# Patient Record
Sex: MALE | Race: BLACK OR AFRICAN AMERICAN | NOT HISPANIC OR LATINO | Employment: FULL TIME | URBAN - METROPOLITAN AREA
[De-identification: names, ages, dates, MRNs, and addresses within clinical notes are randomized per-mention and may not be internally consistent; named-entity substitution may affect disease eponyms.]

---

## 2017-06-12 ENCOUNTER — OFFICE VISIT (OUTPATIENT)
Dept: FAMILY MEDICINE CLINIC | Facility: CLINIC | Age: 57
End: 2017-06-12

## 2017-06-12 VITALS
SYSTOLIC BLOOD PRESSURE: 115 MMHG | WEIGHT: 220 LBS | RESPIRATION RATE: 14 BRPM | DIASTOLIC BLOOD PRESSURE: 75 MMHG | HEIGHT: 74 IN | HEART RATE: 72 BPM | TEMPERATURE: 98 F | BODY MASS INDEX: 28.23 KG/M2

## 2017-06-12 DIAGNOSIS — I10 BENIGN ESSENTIAL HYPERTENSION: ICD-10-CM

## 2017-06-12 DIAGNOSIS — E78.2 MIXED HYPERLIPIDEMIA: Primary | ICD-10-CM

## 2017-06-12 PROCEDURE — 99213 OFFICE O/P EST LOW 20 MIN: CPT | Performed by: FAMILY MEDICINE

## 2017-06-12 RX ORDER — METOPROLOL SUCCINATE 100 MG/1
100 TABLET, EXTENDED RELEASE ORAL DAILY
Qty: 90 TABLET | Refills: 1 | Status: SHIPPED | OUTPATIENT
Start: 2017-06-12 | End: 2017-08-07

## 2017-06-12 RX ORDER — AMLODIPINE BESYLATE 10 MG/1
10 TABLET ORAL DAILY
Qty: 90 TABLET | Refills: 1 | Status: SHIPPED | OUTPATIENT
Start: 2017-06-12 | End: 2018-02-21 | Stop reason: SDUPTHER

## 2017-06-12 RX ORDER — MONTELUKAST SODIUM 10 MG/1
10 TABLET ORAL NIGHTLY
Qty: 30 TABLET | Refills: 11 | Status: CANCELLED | OUTPATIENT
Start: 2017-06-12

## 2017-06-12 RX ORDER — FLUTICASONE PROPIONATE 50 MCG
2 SPRAY, SUSPENSION (ML) NASAL DAILY
Qty: 1 EACH | Refills: 11 | Status: CANCELLED | OUTPATIENT
Start: 2017-06-12

## 2017-06-12 NOTE — PROGRESS NOTES
Subjective   Kenrick Webster is a 56 y.o. male.     History of Present Illness     Chief Complaint:   Chief Complaint   Patient presents with   • Hypertension     med refill    • Hyperlipidemia       Kenrick Webster 56 y.o. male who presents today for Medical Management of the below listed issues and medication refills.  he has a history of   Patient Active Problem List   Diagnosis   • DVT (deep venous thrombosis)   • Benign essential hypertension   • Renal insufficiency   • Vitamin D deficiency   • Hyperlipidemia   • Gastroesophageal reflux disease without esophagitis   • Moderate persistent asthma without complication   .  Since the last visit, he has overall felt well.  he has been compliant with   Current Outpatient Prescriptions:   •  amLODIPine (NORVASC) 10 MG tablet, Take 1 tablet by mouth Daily., Disp: 90 tablet, Rfl: 1  •  fluticasone (FLONASE) 50 MCG/ACT nasal spray, 2 sprays into each nostril Daily. Administer 2 sprays in each nostril for each dose., Disp: 1 each, Rfl: 11  •  azelastine (ASTELIN) 0.1 % nasal spray, 2 sprays into each nostril 2 (Two) Times a Day. Use in each nostril as directed, Disp: , Rfl:   •  cholecalciferol (VITAMIN D3) 1000 UNITS tablet, Take 1,000 Units by mouth daily., Disp: , Rfl:   •  metoprolol succinate XL (TOPROL-XL) 100 MG 24 hr tablet, Take 1 tablet by mouth Daily., Disp: 90 tablet, Rfl: 1  •  mometasone-formoterol (DULERA 200) 200-5 MCG/ACT inhaler, Inhale 2 puffs 2 (Two) Times a Day., Disp: , Rfl:   •  montelukast (SINGULAIR) 10 MG tablet, Take 1 tablet by mouth Every Night., Disp: 30 tablet, Rfl: 11  •  omeprazole (priLOSEC) 20 MG capsule, Take 40 mg by mouth Daily., Disp: , Rfl:   •  pantoprazole (PROTONIX) 40 MG EC tablet, Take 40 mg by mouth Daily., Disp: , Rfl:   •  tobramycin 0.3 % solution ophthalmic solution, Administer 1 drop to both eyes Every 4 (Four) Hours While Awake. One drop to affected eye QID x one week for infection, Disp: 5 mL, Rfl: 0.  he denies  "medication side effects.    All of the chronic condition(s) listed above are stable w/o issues.    /75  Pulse 72  Temp 98 °F (36.7 °C) (Oral)   Resp 14  Ht 73.5\" (186.7 cm)  Wt 220 lb (99.8 kg)  BMI 28.63 kg/m2    Results for orders placed or performed in visit on 12/19/16   Comprehensive metabolic panel   Result Value Ref Range    Glucose 93 65 - 99 mg/dL    BUN 9 6 - 20 mg/dL    Creatinine 1.60 (H) 0.76 - 1.27 mg/dL    eGFR Non African Am 45 (L) >60 mL/min/1.73    eGFR African Am 55 (L) >60 mL/min/1.73    BUN/Creatinine Ratio 5.6 (L) 7.0 - 25.0    Sodium 143 136 - 145 mmol/L    Potassium 4.2 3.5 - 5.2 mmol/L    Chloride 102 98 - 107 mmol/L    Total CO2 29.1 (H) 22.0 - 29.0 mmol/L    Calcium 9.5 8.6 - 10.5 mg/dL    Total Protein 7.0 6.0 - 8.5 g/dL    Albumin 4.30 3.50 - 5.20 g/dL    Globulin 2.7 gm/dL    A/G Ratio 1.6 g/dL    Total Bilirubin 1.1 0.1 - 1.2 mg/dL    Alkaline Phosphatase 63 39 - 117 U/L    AST (SGOT) 14 1 - 40 U/L    ALT (SGPT) 14 1 - 41 U/L   Lipid panel   Result Value Ref Range    Total Cholesterol 209 (H) 0 - 200 mg/dL    Triglycerides 48 0 - 150 mg/dL    HDL Cholesterol 68 (H) 40 - 60 mg/dL    VLDL Cholesterol 9.6 5 - 40 mg/dL    LDL Cholesterol  131 (H) 0 - 100 mg/dL   TSH   Result Value Ref Range    TSH 1.200 0.270 - 4.200 mIU/mL   CBC and Differential   Result Value Ref Range    WBC 7.09 4.50 - 10.70 10*3/mm3    RBC 5.09 4.60 - 6.00 10*6/mm3    Hemoglobin 15.1 13.7 - 17.6 g/dL    Hematocrit 47.3 40.4 - 52.2 %    MCV 92.9 79.8 - 96.2 fL    MCH 29.7 27.0 - 32.7 pg    MCHC 31.9 (L) 32.6 - 36.4 g/dL    RDW 13.6 11.5 - 14.5 %    Platelets 312 140 - 500 10*3/mm3    Neutrophil Rel % 50.9 42.7 - 76.0 %    Lymphocyte Rel % 35.0 19.6 - 45.3 %    Monocyte Rel % 8.0 5.0 - 12.0 %    Eosinophil Rel % 5.4 0.3 - 6.2 %    Basophil Rel % 0.7 0.0 - 1.5 %    Neutrophils Absolute 3.61 1.90 - 8.10 10*3/mm3    Lymphocytes Absolute 2.48 0.90 - 4.80 10*3/mm3    Monocytes Absolute 0.57 0.20 - 1.20 10*3/mm3 "    Eosinophils Absolute 0.38 0.00 - 0.70 10*3/mm3    Basophils Absolute 0.05 0.00 - 0.20 10*3/mm3    Immature Granulocyte Rel % 0.0 0.0 - 0.5 %    Immature Grans Absolute 0.00 0.00 - 0.03 10*3/mm3         The following portions of the patient's history were reviewed and updated as appropriate: allergies, current medications, past family history, past medical history, past social history, past surgical history and problem list.    Review of Systems   Constitutional: Negative for activity change, chills, fatigue and fever.   Respiratory: Negative for cough and shortness of breath.    Cardiovascular: Negative for chest pain and palpitations.   Gastrointestinal: Negative for abdominal pain.   Endocrine: Negative for cold intolerance.   Psychiatric/Behavioral: Negative for behavioral problems and dysphoric mood. The patient is not nervous/anxious.        Objective   Physical Exam   Constitutional: He appears well-developed and well-nourished.   Neck: Neck supple. No thyromegaly present.   Cardiovascular: Normal rate and regular rhythm.    No murmur heard.  Pulmonary/Chest: Effort normal. He has wheezes (mild).   Abdominal: Bowel sounds are normal.   Psychiatric: He has a normal mood and affect. His behavior is normal.   Nursing note and vitals reviewed.      Assessment/Plan   Kenrick was seen today for hypertension and hyperlipidemia.    Diagnoses and all orders for this visit:    Mixed hyperlipidemia  -     Lipid Panel    Benign essential hypertension  -     metoprolol succinate XL (TOPROL-XL) 100 MG 24 hr tablet; Take 1 tablet by mouth Daily.  -     amLODIPine (NORVASC) 10 MG tablet; Take 1 tablet by mouth Daily.  -     Comprehensive Metabolic Panel    Other orders  -     Cancel: montelukast (SINGULAIR) 10 MG tablet; Take 1 tablet by mouth Every Night.  -     Cancel: fluticasone (FLONASE) 50 MCG/ACT nasal spray; 2 sprays into each nostril Daily. Administer 2 sprays in each nostril for each dose.

## 2017-06-14 LAB
ALBUMIN SERPL-MCNC: 4.1 G/DL (ref 3.5–5.5)
ALBUMIN/GLOB SERPL: 1.4 {RATIO} (ref 1.2–2.2)
ALP SERPL-CCNC: 64 IU/L (ref 39–117)
ALT SERPL-CCNC: 12 IU/L (ref 0–44)
AST SERPL-CCNC: 17 IU/L (ref 0–40)
BILIRUB SERPL-MCNC: 1.3 MG/DL (ref 0–1.2)
BUN SERPL-MCNC: 7 MG/DL (ref 6–24)
BUN/CREAT SERPL: 4 (ref 9–20)
CALCIUM SERPL-MCNC: 9.5 MG/DL (ref 8.7–10.2)
CHLORIDE SERPL-SCNC: 99 MMOL/L (ref 96–106)
CHOLEST SERPL-MCNC: 212 MG/DL (ref 100–199)
CO2 SERPL-SCNC: 26 MMOL/L (ref 18–29)
CREAT SERPL-MCNC: 1.56 MG/DL (ref 0.76–1.27)
GLOBULIN SER CALC-MCNC: 2.9 G/DL (ref 1.5–4.5)
GLUCOSE SERPL-MCNC: 89 MG/DL (ref 65–99)
HDLC SERPL-MCNC: 59 MG/DL
LDLC SERPL CALC-MCNC: 135 MG/DL (ref 0–99)
POTASSIUM SERPL-SCNC: 3.9 MMOL/L (ref 3.5–5.2)
PROT SERPL-MCNC: 7 G/DL (ref 6–8.5)
SODIUM SERPL-SCNC: 142 MMOL/L (ref 134–144)
TRIGL SERPL-MCNC: 91 MG/DL (ref 0–149)
VLDLC SERPL CALC-MCNC: 18 MG/DL (ref 5–40)

## 2017-06-18 DIAGNOSIS — K21.9 GASTROESOPHAGEAL REFLUX DISEASE WITHOUT ESOPHAGITIS: ICD-10-CM

## 2017-06-19 RX ORDER — OMEPRAZOLE 40 MG/1
CAPSULE, DELAYED RELEASE ORAL
Qty: 90 CAPSULE | Refills: 1 | OUTPATIENT
Start: 2017-06-19

## 2017-08-07 ENCOUNTER — OFFICE VISIT (OUTPATIENT)
Dept: FAMILY MEDICINE CLINIC | Facility: CLINIC | Age: 57
End: 2017-08-07

## 2017-08-07 VITALS
TEMPERATURE: 98.8 F | SYSTOLIC BLOOD PRESSURE: 119 MMHG | HEART RATE: 78 BPM | HEIGHT: 73 IN | WEIGHT: 232 LBS | RESPIRATION RATE: 16 BRPM | DIASTOLIC BLOOD PRESSURE: 81 MMHG | BODY MASS INDEX: 30.75 KG/M2

## 2017-08-07 DIAGNOSIS — E78.2 MIXED HYPERLIPIDEMIA: ICD-10-CM

## 2017-08-07 DIAGNOSIS — I10 BENIGN ESSENTIAL HYPERTENSION: Primary | ICD-10-CM

## 2017-08-07 PROCEDURE — 99213 OFFICE O/P EST LOW 20 MIN: CPT | Performed by: FAMILY MEDICINE

## 2017-08-07 RX ORDER — ATORVASTATIN CALCIUM 10 MG/1
10 TABLET, FILM COATED ORAL DAILY
Qty: 90 TABLET | Refills: 1 | Status: SHIPPED | OUTPATIENT
Start: 2017-08-07 | End: 2018-02-21 | Stop reason: SDUPTHER

## 2017-08-07 RX ORDER — LOSARTAN POTASSIUM 50 MG/1
50 TABLET ORAL DAILY
Qty: 90 TABLET | Refills: 1 | Status: SHIPPED | OUTPATIENT
Start: 2017-08-07 | End: 2018-02-21 | Stop reason: SDUPTHER

## 2017-08-07 NOTE — PROGRESS NOTES
Subjective   Kenrick Webster is a 56 y.o. male.     History of Present Illness     Chief Complaint:   Chief Complaint   Patient presents with   • Hypertension     TO DISCUSS STOPPING TOPROL XL MEDCATION   • Hyperlipidemia       Kenrick Webster 56 y.o. male who presents today for Medical Management of the below listed issues and medication refills.  he has a history of   Patient Active Problem List   Diagnosis   • DVT (deep venous thrombosis)   • Benign essential hypertension   • Renal insufficiency   • Vitamin D deficiency   • Hyperlipidemia   • Gastroesophageal reflux disease without esophagitis   • Moderate persistent asthma without complication   .  Since the last visit, he has overall felt well. He has talked with his allergist, Dr. Galindo, who recommended that we stop his B-Blocker due to need for EpiPen given.  he has been compliant with   Current Outpatient Prescriptions:   •  amLODIPine (NORVASC) 10 MG tablet, Take 1 tablet by mouth Daily., Disp: 90 tablet, Rfl: 1  •  atorvastatin (LIPITOR) 10 MG tablet, Take 1 tablet by mouth Daily., Disp: 90 tablet, Rfl: 1  •  azelastine (ASTELIN) 0.1 % nasal spray, 2 sprays into each nostril 2 (Two) Times a Day. Use in each nostril as directed, Disp: , Rfl:   •  cholecalciferol (VITAMIN D3) 1000 UNITS tablet, Take 1,000 Units by mouth daily., Disp: , Rfl:   •  fluticasone (FLONASE) 50 MCG/ACT nasal spray, 2 sprays into each nostril Daily. Administer 2 sprays in each nostril for each dose., Disp: 1 each, Rfl: 11  •  losartan (COZAAR) 50 MG tablet, Take 1 tablet by mouth Daily., Disp: 90 tablet, Rfl: 1  •  mometasone-formoterol (DULERA 200) 200-5 MCG/ACT inhaler, Inhale 2 puffs 2 (Two) Times a Day., Disp: , Rfl:   •  montelukast (SINGULAIR) 10 MG tablet, Take 1 tablet by mouth Every Night., Disp: 30 tablet, Rfl: 11  •  omeprazole (priLOSEC) 20 MG capsule, Take 40 mg by mouth Daily., Disp: , Rfl:   •  pantoprazole (PROTONIX) 40 MG EC tablet, Take 40 mg by mouth Daily.,  "Disp: , Rfl: .  he denies medication side effects.    His LDL of 135 (June) needs to be addressed today, too.    All of the chronic condition(s) listed above are stable w/o issues.    /81  Pulse 78  Temp 98.8 °F (37.1 °C) (Oral)   Resp 16  Ht 73\" (185.4 cm)  Wt 232 lb (105 kg)  BMI 30.61 kg/m2    Results for orders placed or performed in visit on 06/12/17   Comprehensive Metabolic Panel   Result Value Ref Range    Glucose 89 65 - 99 mg/dL    BUN 7 6 - 24 mg/dL    Creatinine 1.56 (H) 0.76 - 1.27 mg/dL    eGFR Non African Am 50 (L) >59 mL/min/1.73    eGFR  Am 57 (L) >59 mL/min/1.73    BUN/Creatinine Ratio 4 (L) 9 - 20    Sodium 142 134 - 144 mmol/L    Potassium 3.9 3.5 - 5.2 mmol/L    Chloride 99 96 - 106 mmol/L    Total CO2 26 18 - 29 mmol/L    Calcium 9.5 8.7 - 10.2 mg/dL    Total Protein 7.0 6.0 - 8.5 g/dL    Albumin 4.1 3.5 - 5.5 g/dL    Globulin 2.9 1.5 - 4.5 g/dL    A/G Ratio 1.4 1.2 - 2.2    Total Bilirubin 1.3 (H) 0.0 - 1.2 mg/dL    Alkaline Phosphatase 64 39 - 117 IU/L    AST (SGOT) 17 0 - 40 IU/L    ALT (SGPT) 12 0 - 44 IU/L   Lipid Panel   Result Value Ref Range    Total Cholesterol 212 (H) 100 - 199 mg/dL    Triglycerides 91 0 - 149 mg/dL    HDL Cholesterol 59 >39 mg/dL    VLDL Cholesterol 18 5 - 40 mg/dL    LDL Cholesterol  135 (H) 0 - 99 mg/dL         The following portions of the patient's history were reviewed and updated as appropriate: allergies, current medications, past family history, past medical history, past social history, past surgical history and problem list.    Review of Systems   Constitutional: Negative for activity change, chills, fatigue and fever.   Respiratory: Negative for cough and shortness of breath.    Cardiovascular: Negative for chest pain and palpitations.   Gastrointestinal: Negative for abdominal pain.   Endocrine: Negative for cold intolerance.   Psychiatric/Behavioral: Negative for behavioral problems and dysphoric mood. The patient is not " nervous/anxious.        Objective   Physical Exam   Constitutional: He appears well-developed and well-nourished.   Neck: Neck supple. No thyromegaly present.   Cardiovascular: Normal rate and regular rhythm.    No murmur heard.  Pulmonary/Chest: Effort normal and breath sounds normal.   Abdominal: Bowel sounds are normal.   Psychiatric: He has a normal mood and affect. His behavior is normal.   Nursing note and vitals reviewed.      Assessment/Plan   Kenrick was seen today for hypertension and hyperlipidemia.    Diagnoses and all orders for this visit:    Benign essential hypertension  Comments:  change in therapy  Orders:  -     losartan (COZAAR) 50 MG tablet; Take 1 tablet by mouth Daily.  -     Basic Metabolic Panel; Future    Mixed hyperlipidemia  -     atorvastatin (LIPITOR) 10 MG tablet; Take 1 tablet by mouth Daily.

## 2017-08-14 ENCOUNTER — RESULTS ENCOUNTER (OUTPATIENT)
Dept: FAMILY MEDICINE CLINIC | Facility: CLINIC | Age: 57
End: 2017-08-14

## 2017-08-14 DIAGNOSIS — I10 BENIGN ESSENTIAL HYPERTENSION: ICD-10-CM

## 2017-09-25 ENCOUNTER — APPOINTMENT (OUTPATIENT)
Dept: GENERAL RADIOLOGY | Facility: HOSPITAL | Age: 57
End: 2017-09-25

## 2017-09-25 PROCEDURE — 73562 X-RAY EXAM OF KNEE 3: CPT | Performed by: FAMILY MEDICINE

## 2017-09-27 ENCOUNTER — APPOINTMENT (OUTPATIENT)
Dept: GENERAL RADIOLOGY | Facility: HOSPITAL | Age: 57
End: 2017-09-27

## 2017-09-27 ENCOUNTER — HOSPITAL ENCOUNTER (EMERGENCY)
Facility: HOSPITAL | Age: 57
Discharge: HOME | End: 2017-09-27
Attending: EMERGENCY MEDICINE | Admitting: EMERGENCY MEDICINE

## 2017-09-27 VITALS
TEMPERATURE: 98 F | WEIGHT: 200 LBS | BODY MASS INDEX: 27.09 KG/M2 | RESPIRATION RATE: 16 BRPM | HEIGHT: 72 IN | OXYGEN SATURATION: 98 % | HEART RATE: 71 BPM | SYSTOLIC BLOOD PRESSURE: 110 MMHG | DIASTOLIC BLOOD PRESSURE: 81 MMHG

## 2017-09-27 DIAGNOSIS — S86.911A KNEE STRAIN, RIGHT, INITIAL ENCOUNTER: Primary | ICD-10-CM

## 2017-09-27 PROCEDURE — 99283 EMERGENCY DEPT VISIT LOW MDM: CPT

## 2017-09-30 LAB
BUN SERPL-MCNC: 11 MG/DL (ref 6–20)
BUN/CREAT SERPL: 7.5 (ref 7–25)
CALCIUM SERPL-MCNC: 9.4 MG/DL (ref 8.6–10.5)
CHLORIDE SERPL-SCNC: 102 MMOL/L (ref 98–107)
CO2 SERPL-SCNC: 24.4 MMOL/L (ref 22–29)
CREAT SERPL-MCNC: 1.46 MG/DL (ref 0.76–1.27)
GLUCOSE SERPL-MCNC: 90 MG/DL (ref 65–99)
POTASSIUM SERPL-SCNC: 3.9 MMOL/L (ref 3.5–5.2)
SODIUM SERPL-SCNC: 142 MMOL/L (ref 136–145)

## 2017-10-04 ENCOUNTER — OFFICE VISIT (OUTPATIENT)
Dept: FAMILY MEDICINE CLINIC | Facility: CLINIC | Age: 57
End: 2017-10-04

## 2017-10-04 VITALS
DIASTOLIC BLOOD PRESSURE: 76 MMHG | TEMPERATURE: 98.7 F | BODY MASS INDEX: 28.11 KG/M2 | HEART RATE: 88 BPM | HEIGHT: 74 IN | RESPIRATION RATE: 16 BRPM | SYSTOLIC BLOOD PRESSURE: 107 MMHG | WEIGHT: 219 LBS

## 2017-10-04 DIAGNOSIS — M25.561 ACUTE PAIN OF RIGHT KNEE: Primary | ICD-10-CM

## 2017-10-04 DIAGNOSIS — I10 BENIGN ESSENTIAL HYPERTENSION: ICD-10-CM

## 2017-10-04 PROCEDURE — 99213 OFFICE O/P EST LOW 20 MIN: CPT | Performed by: FAMILY MEDICINE

## 2017-10-04 NOTE — PROGRESS NOTES
"Subjective   Kenrick Webster is a 56 y.o. male.     CC: Hospital ED F/U for Right Knee Pain    History of Present Illness     Pt returns today after a recent ED visit for the above. That visit was as follows:    HPI:  Pt is a 56 y.o. male, with hx of arthritis, who presents complaining of R knee pain and swelling for ~ 5 days after he heard a \"pop\" while he was rushing to walk into a . Pt was seen at urgent care 2 days ago and had a R knee XR that showed arthritis but no acute fracture or abnormality. Pt states hx of similar pain ~2 years ago related to arthritis and states he has a knee brace but has not been wearing it.   Pt is concerned because he works as a  and was supposed to go back to work today but is unable to move his leg back and forth.     12:12 PM  Advised Pt to ice and wear knee brace when ambulating and continue using diclofenac cream for pain control. Pt cannot take NSAID's due to kidney disease. Pt will be discharged. Pt verbalizes understanding and agrees with plan. All questions and concerns addressed at this time.     DIAGNOSIS  Final diagnoses:   Knee strain, right, initial encounter     New Prescriptions         diclofenac 1 % gel gel   Commonly known as:  VOLTAREN   Apply 4 g topically 4 (Four) Times a Day As Needed (apply to right knee).       Current medication list is compared to recent hospital d/c and the medications are reconciled.    He also had some recent labs for me done regarding his BUN/Cr and K+ and is here to review that today, too.    The following portions of the patient's history were reviewed and updated as appropriate: allergies, current medications, past family history, past medical history, past social history, past surgical history and problem list.    Review of Systems   Constitutional: Negative for activity change, chills, fatigue and fever.   Respiratory: Negative for cough and shortness of breath.    Cardiovascular: Negative for chest pain " and palpitations.   Gastrointestinal: Negative for abdominal pain.   Endocrine: Negative for cold intolerance.   Musculoskeletal:        Right knee pain   Psychiatric/Behavioral: Negative for behavioral problems and dysphoric mood. The patient is not nervous/anxious.        Objective   Physical Exam   Constitutional: He appears well-developed and well-nourished.   Neck: Neck supple. No thyromegaly present.   Cardiovascular: Normal rate and regular rhythm.    No murmur heard.  Pulmonary/Chest: Effort normal and breath sounds normal.   Abdominal: Bowel sounds are normal.   Musculoskeletal: He exhibits tenderness (tender medial/inferior knee w/o laxity).   Psychiatric: He has a normal mood and affect. His behavior is normal.   Nursing note and vitals reviewed.  Hospital records reviewed with pt confirming HPI.  Labs reviewed with pt today during visit. All questions answered.      Assessment/Plan   Kenrick was seen today for hypertension, hyperlipidemia and rt knee injury / pain.    Diagnoses and all orders for this visit:    Acute pain of right knee    Benign essential hypertension      Pt to continue current medications at this time. Keep f/u tomorrow with ortho.regarding the knee. Continue medications.

## 2017-10-06 ENCOUNTER — TELEPHONE (OUTPATIENT)
Dept: SOCIAL WORK | Facility: HOSPITAL | Age: 57
End: 2017-10-06

## 2017-10-17 ENCOUNTER — APPOINTMENT (OUTPATIENT)
Dept: CT IMAGING | Facility: HOSPITAL | Age: 57
End: 2017-10-17

## 2017-10-17 ENCOUNTER — HOSPITAL ENCOUNTER (EMERGENCY)
Facility: HOSPITAL | Age: 57
Discharge: HOME | End: 2017-10-17
Attending: EMERGENCY MEDICINE | Admitting: EMERGENCY MEDICINE

## 2017-10-17 ENCOUNTER — APPOINTMENT (OUTPATIENT)
Dept: GENERAL RADIOLOGY | Facility: HOSPITAL | Age: 57
End: 2017-10-17

## 2017-10-17 VITALS
BODY MASS INDEX: 29.16 KG/M2 | DIASTOLIC BLOOD PRESSURE: 85 MMHG | OXYGEN SATURATION: 100 % | SYSTOLIC BLOOD PRESSURE: 125 MMHG | RESPIRATION RATE: 18 BRPM | TEMPERATURE: 98.1 F | WEIGHT: 220 LBS | HEART RATE: 74 BPM | HEIGHT: 73 IN

## 2017-10-17 DIAGNOSIS — R07.89 ATYPICAL CHEST PAIN: Primary | ICD-10-CM

## 2017-10-17 LAB
ALBUMIN SERPL-MCNC: 4.1 G/DL (ref 3.5–5.2)
ALBUMIN/GLOB SERPL: 1.4 G/DL
ALP SERPL-CCNC: 61 U/L (ref 39–117)
ALT SERPL W P-5'-P-CCNC: 17 U/L (ref 1–41)
ANION GAP SERPL CALCULATED.3IONS-SCNC: 12 MMOL/L
AST SERPL-CCNC: 26 U/L (ref 1–40)
BASOPHILS # BLD AUTO: 0.04 10*3/MM3 (ref 0–0.2)
BASOPHILS NFR BLD AUTO: 0.6 % (ref 0–1.5)
BILIRUB SERPL-MCNC: 1.3 MG/DL (ref 0.1–1.2)
BUN BLD-MCNC: 10 MG/DL (ref 6–20)
BUN/CREAT SERPL: 6.1 (ref 7–25)
CALCIUM SPEC-SCNC: 9.7 MG/DL (ref 8.6–10.5)
CHLORIDE SERPL-SCNC: 103 MMOL/L (ref 98–107)
CO2 SERPL-SCNC: 28 MMOL/L (ref 22–29)
CREAT BLD-MCNC: 1.63 MG/DL (ref 0.76–1.27)
DEPRECATED RDW RBC AUTO: 43.1 FL (ref 37–54)
EOSINOPHIL # BLD AUTO: 0.31 10*3/MM3 (ref 0–0.7)
EOSINOPHIL NFR BLD AUTO: 4.5 % (ref 0.3–6.2)
ERYTHROCYTE [DISTWIDTH] IN BLOOD BY AUTOMATED COUNT: 13.5 % (ref 11.5–14.5)
GFR SERPL CREATININE-BSD FRML MDRD: 54 ML/MIN/1.73
GLOBULIN UR ELPH-MCNC: 3 GM/DL
GLUCOSE BLD-MCNC: 90 MG/DL (ref 65–99)
HCT VFR BLD AUTO: 39.9 % (ref 40.4–52.2)
HGB BLD-MCNC: 13.6 G/DL (ref 13.7–17.6)
HOLD SPECIMEN: NORMAL
HOLD SPECIMEN: NORMAL
IMM GRANULOCYTES # BLD: 0 10*3/MM3 (ref 0–0.03)
IMM GRANULOCYTES NFR BLD: 0 % (ref 0–0.5)
LYMPHOCYTES # BLD AUTO: 3.08 10*3/MM3 (ref 0.9–4.8)
LYMPHOCYTES NFR BLD AUTO: 44.3 % (ref 19.6–45.3)
MCH RBC QN AUTO: 29.6 PG (ref 27–32.7)
MCHC RBC AUTO-ENTMCNC: 34.1 G/DL (ref 32.6–36.4)
MCV RBC AUTO: 86.7 FL (ref 79.8–96.2)
MONOCYTES # BLD AUTO: 0.69 10*3/MM3 (ref 0.2–1.2)
MONOCYTES NFR BLD AUTO: 9.9 % (ref 5–12)
NEUTROPHILS # BLD AUTO: 2.84 10*3/MM3 (ref 1.9–8.1)
NEUTROPHILS NFR BLD AUTO: 40.7 % (ref 42.7–76)
PLATELET # BLD AUTO: 288 10*3/MM3 (ref 140–500)
PMV BLD AUTO: 9.7 FL (ref 6–12)
POTASSIUM BLD-SCNC: 3.5 MMOL/L (ref 3.5–5.2)
PROT SERPL-MCNC: 7.1 G/DL (ref 6–8.5)
RBC # BLD AUTO: 4.6 10*6/MM3 (ref 4.6–6)
SODIUM BLD-SCNC: 143 MMOL/L (ref 136–145)
TROPONIN T SERPL-MCNC: <0.01 NG/ML (ref 0–0.03)
TROPONIN T SERPL-MCNC: <0.01 NG/ML (ref 0–0.03)
WBC NRBC COR # BLD: 6.96 10*3/MM3 (ref 4.5–10.7)
WHOLE BLOOD HOLD SPECIMEN: NORMAL
WHOLE BLOOD HOLD SPECIMEN: NORMAL

## 2017-10-17 PROCEDURE — 84484 ASSAY OF TROPONIN QUANT: CPT | Performed by: PHYSICIAN ASSISTANT

## 2017-10-17 PROCEDURE — 93010 ELECTROCARDIOGRAM REPORT: CPT | Performed by: INTERNAL MEDICINE

## 2017-10-17 PROCEDURE — 85025 COMPLETE CBC W/AUTO DIFF WBC: CPT | Performed by: EMERGENCY MEDICINE

## 2017-10-17 PROCEDURE — 96361 HYDRATE IV INFUSION ADD-ON: CPT

## 2017-10-17 PROCEDURE — 80053 COMPREHEN METABOLIC PANEL: CPT | Performed by: EMERGENCY MEDICINE

## 2017-10-17 PROCEDURE — 99284 EMERGENCY DEPT VISIT MOD MDM: CPT

## 2017-10-17 PROCEDURE — 84484 ASSAY OF TROPONIN QUANT: CPT | Performed by: EMERGENCY MEDICINE

## 2017-10-17 PROCEDURE — 96360 HYDRATION IV INFUSION INIT: CPT

## 2017-10-17 PROCEDURE — 0 IOPAMIDOL PER 1 ML: Performed by: EMERGENCY MEDICINE

## 2017-10-17 PROCEDURE — 71020 HC CHEST PA AND LATERAL: CPT

## 2017-10-17 PROCEDURE — 71275 CT ANGIOGRAPHY CHEST: CPT

## 2017-10-17 PROCEDURE — 93005 ELECTROCARDIOGRAM TRACING: CPT | Performed by: EMERGENCY MEDICINE

## 2017-10-17 RX ORDER — SODIUM CHLORIDE 0.9 % (FLUSH) 0.9 %
10 SYRINGE (ML) INJECTION AS NEEDED
Status: DISCONTINUED | OUTPATIENT
Start: 2017-10-17 | End: 2017-10-18 | Stop reason: HOSPADM

## 2017-10-17 RX ORDER — ASPIRIN 325 MG
325 TABLET ORAL ONCE
Status: COMPLETED | OUTPATIENT
Start: 2017-10-17 | End: 2017-10-17

## 2017-10-17 RX ADMIN — ASPIRIN 325 MG: 325 TABLET ORAL at 21:08

## 2017-10-17 RX ADMIN — SODIUM CHLORIDE 1000 ML: 9 INJECTION, SOLUTION INTRAVENOUS at 21:08

## 2017-10-17 RX ADMIN — IOPAMIDOL 95 ML: 755 INJECTION, SOLUTION INTRAVENOUS at 21:41

## 2017-10-18 NOTE — DISCHARGE INSTRUCTIONS
PLEASE READ AND REVIEW ALL DISCHARGE INSTRUCTIONS.     Please follow up with your primary care physician for any further evaluation/treatment and further management of your blood pressure.     Recheck in emergency department for any worsening and/or concerning symptoms.     Take all prescribed medicine as written and continue chronic medication.    Please follow up with cardiology to schedule outpatient stress test.

## 2017-10-18 NOTE — ED PROVIDER NOTES
The patient presents complaining of constant CP starting at 1600 today. Pt states a Hx of DVT and hypertension.     Physical Exam:   Pt is resting comfortably, and in no acute distress.     Labs:   2211 Troponin <0.010, 2212 Troponin <0.010    Radiology:   CTA Chest: negative.     Plan:   Will d/c the pt with a f/u with a cardiologist.     I supervised care provided by the midlevel provider.  We have discussed this patient's history, physical exam, and treatment plan.  I have reviewed the note and personally saw and examined the patient and agree with the plan of care.    Documentation assistance provided by dallas Kirkpatrick.  Information recorded by the scribe was done at my direction and has been verified and validated by me.     Arslan Kirkpatrick  10/17/17 [id number]       Bharathi Dueñas MD  10/18/17 [id number]

## 2017-10-18 NOTE — ED PROVIDER NOTES
EMERGENCY DEPARTMENT ENCOUNTER    CHIEF COMPLAINT  Chief Complaint: Chest Pain  History given by:Patient  History limited by:Nothing  Room Number: 26/26  PMD: Galileo Urbina MD      HPI:  Pt is a 56 y.o. 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 The patient explains that the pain is exacerbated with palpation and is alleviated with no known factors. He denies any associated dyspnea, cough, diaphoresis, nausea or vomiting since onset. Patient denies having any previous cardiac workup in the past and he has never seen a Cardiologist. He notes that he has h/o HTN and has family h/o CAD. No other complaints at this time.    Duration: 4 hours  Timing:Constant  Location:Just left of the center of the chest  Radiation:None  Quality:Sharp  Intensity/Severity:Moderate  Progression:No Change  Associated Symptoms:Chest pain  Aggravating Factors:Palpation  Alleviating Factors:Unknown  Previous Episodes:None  Treatment before arrival:None mentioned     MEDICAL RECORD REVIEW  H/o DVT but does not list any anticoagulation, HTN. Do not seen any previous Cardiology evaluations.     PAST MEDICAL HISTORY  Active Ambulatory Problems     Diagnosis Date Noted   • DVT (deep venous thrombosis)    • Benign essential hypertension    • Renal insufficiency    • Vitamin D deficiency    • Hyperlipidemia    • Gastroesophageal reflux disease without esophagitis 11/25/2015   • Moderate persistent asthma without complication 12/19/2016     Resolved Ambulatory Problems     Diagnosis Date Noted   • No Resolved Ambulatory Problems     Past Medical History:   Diagnosis Date   • Anxiety and depression    • Back pain    • Benign essential hypertension    • Bursitis    • DVT (deep venous thrombosis)    • H/O complete eye exam scheduled   • Hyperlipidemia    • Renal  insufficiency    • Vitamin D deficiency        PAST SURGICAL HISTORY  Past Surgical History:   Procedure Laterality Date   • COLONOSCOPY     • ENDOSCOPY     • OTHER SURGICAL HISTORY      gallbladder testing or surgery       FAMILY HISTORY  Family History   Problem Relation Age of Onset   • Hypertension Sister    • Kidney disease Sister    • Diabetes Brother        SOCIAL HISTORY  Social History     Social History   • Marital status: Single     Spouse name: N/A   • Number of children: N/A   • Years of education: N/A     Occupational History   • Not on file.     Social History Main Topics   • Smoking status: Never Smoker   • Smokeless tobacco: Never Used   • Alcohol use No   • Drug use: No   • Sexual activity: Not on file     Other Topics Concern   • Not on file     Social History Narrative       ALLERGIES  Review of patient's allergies indicates no known allergies.    REVIEW OF SYSTEMS  Review of Systems   Constitutional: Negative.  Negative for chills and fever.   HENT: Negative.  Negative for sore throat.    Eyes: Negative.    Respiratory: Negative.  Negative for cough.    Cardiovascular: Positive for chest pain.   Gastrointestinal: Negative.    Genitourinary: Negative.  Negative for dysuria.   Musculoskeletal: Negative.  Negative for back pain.   Skin: Negative.  Negative for rash.   Neurological: Negative.  Negative for headaches.       PHYSICAL EXAM  ED Triage Vitals   Temp Heart Rate Resp BP SpO2   10/17/17 1958 10/17/17 1958 10/17/17 1958 -- 10/17/17 1958   98.1 °F (36.7 °C) 88 18  98 %      Temp src Heart Rate Source Patient Position BP Location FiO2 (%)   10/17/17 1958 10/17/17 1958 -- -- --   Tympanic Monitor          Physical Exam   Constitutional: He is oriented to person, place, and time and well-developed, well-nourished, and in no distress. No distress.   HENT:   Head: Normocephalic and atraumatic.   Mouth/Throat: Oropharynx is clear and moist.   Eyes: EOM are normal. Pupils are equal, round, and  reactive to light.   Neck: Normal range of motion. Neck supple.   Cardiovascular: Normal rate, regular rhythm and normal heart sounds.    Pulse 75   Pulmonary/Chest: Effort normal and breath sounds normal. No respiratory distress. He has no wheezes. He exhibits no tenderness.   Just left to mid sternum there is a palpable deep knot which is tender to palpation. Chest pain not pleuritic.   Abdominal: Soft. He exhibits no distension. There is no tenderness. There is no rebound and no guarding.   Musculoskeletal: Normal range of motion. He exhibits no edema.   Lymphadenopathy:     He has no cervical adenopathy.   Neurological: He is alert and oriented to person, place, and time.   Skin: Skin is warm and dry. No rash noted. No pallor.   No peripheral edema   Psychiatric: Mood, memory, affect and judgment normal.   Nursing note and vitals reviewed.      LAB RESULTS  Recent Results (from the past 24 hour(s))   Comprehensive Metabolic Panel    Collection Time: 10/17/17  8:12 PM   Result Value Ref Range    Glucose 90 65 - 99 mg/dL    BUN 10 6 - 20 mg/dL    Creatinine 1.63 (H) 0.76 - 1.27 mg/dL    Sodium 143 136 - 145 mmol/L    Potassium 3.5 3.5 - 5.2 mmol/L    Chloride 103 98 - 107 mmol/L    CO2 28.0 22.0 - 29.0 mmol/L    Calcium 9.7 8.6 - 10.5 mg/dL    Total Protein 7.1 6.0 - 8.5 g/dL    Albumin 4.10 3.50 - 5.20 g/dL    ALT (SGPT) 17 1 - 41 U/L    AST (SGOT) 26 1 - 40 U/L    Alkaline Phosphatase 61 39 - 117 U/L    Total Bilirubin 1.3 (H) 0.1 - 1.2 mg/dL    eGFR  African Amer 54 (L) >60 mL/min/1.73    Globulin 3.0 gm/dL    A/G Ratio 1.4 g/dL    BUN/Creatinine Ratio 6.1 (L) 7.0 - 25.0    Anion Gap 12.0 mmol/L   Troponin    Collection Time: 10/17/17  8:12 PM   Result Value Ref Range    Troponin T <0.010 0.000 - 0.030 ng/mL   Light Blue Top    Collection Time: 10/17/17  8:12 PM   Result Value Ref Range    Extra Tube hold for add-on    Green Top (Gel)    Collection Time: 10/17/17  8:12 PM   Result Value Ref Range    Extra Tube  Hold for add-ons.    Lavender Top    Collection Time: 10/17/17  8:12 PM   Result Value Ref Range    Extra Tube hold for add-on    Gold Top - SST    Collection Time: 10/17/17  8:12 PM   Result Value Ref Range    Extra Tube Hold for add-ons.    CBC Auto Differential    Collection Time: 10/17/17  8:12 PM   Result Value Ref Range    WBC 6.96 4.50 - 10.70 10*3/mm3    RBC 4.60 4.60 - 6.00 10*6/mm3    Hemoglobin 13.6 (L) 13.7 - 17.6 g/dL    Hematocrit 39.9 (L) 40.4 - 52.2 %    MCV 86.7 79.8 - 96.2 fL    MCH 29.6 27.0 - 32.7 pg    MCHC 34.1 32.6 - 36.4 g/dL    RDW 13.5 11.5 - 14.5 %    RDW-SD 43.1 37.0 - 54.0 fl    MPV 9.7 6.0 - 12.0 fL    Platelets 288 140 - 500 10*3/mm3    Neutrophil % 40.7 (L) 42.7 - 76.0 %    Lymphocyte % 44.3 19.6 - 45.3 %    Monocyte % 9.9 5.0 - 12.0 %    Eosinophil % 4.5 0.3 - 6.2 %    Basophil % 0.6 0.0 - 1.5 %    Immature Grans % 0.0 0.0 - 0.5 %    Neutrophils, Absolute 2.84 1.90 - 8.10 10*3/mm3    Lymphocytes, Absolute 3.08 0.90 - 4.80 10*3/mm3    Monocytes, Absolute 0.69 0.20 - 1.20 10*3/mm3    Eosinophils, Absolute 0.31 0.00 - 0.70 10*3/mm3    Basophils, Absolute 0.04 0.00 - 0.20 10*3/mm3    Immature Grans, Absolute 0.00 0.00 - 0.03 10*3/mm3   Troponin    Collection Time: 10/17/17 10:11 PM   Result Value Ref Range    Troponin T <0.010 0.000 - 0.030 ng/mL       I ordered the above labs and reviewed the results    RADIOLOGY  CT Angiogram Chest With Contrast   Final Result   1. No active disease or evidence of pulmonary embolism.   2. Small left renal lesion as discussed       This report was finalized on 10/17/2017 10:07 PM by Carlos Block MD.          XR Chest 2 View   Final Result        Reviewed CXR - The lungs are well-expanded and clear and the heart and hilar structures are normal. There is no acute disease.. Independently viewed by me. Interpreted by radiologist.     Reviewed CTA Chest - No active disease or evidence of pulmonary embolism. Small left renal lesion as discussed.  Independently viewed by me. Interpreted by radiologist.       I ordered the above noted radiological studies and reviewed the images on the PACS system.        PROCEDURE    HEARTSCORE    History  Highly suspicious              2    Moderately suspicious             1    Slightly or non-suspicious             0    ECG  Significant ST depression              2    Nonspecific repol disturbance            1    Normal                           0    Age  > or = 65                          2     46-65                           1    < or = 45                          0    Risk factors (hypercholesterolemia, HTN, DM, smoking, pos fam hx, obesity)                            > or = to 3 RF for atherosclerotic dx   2    1 or 2                 1    No risk factors                0    Troponin > or = 3x normal limit               2    1-3x normal limit    1    < or = Normal limit    0    Score  0 - 3 is low risk    This patient's HEART score is 3     We discussed the shared decision pathway regarding the patient's heart score and choice for being discharged home versus admitted to the hospital for further evaluation. Patient is in agreement to be discharged at this time for follow-up with her family physician and/ or cardiologist.        EKG    ekg was interpreted by       COURSE & MEDICAL DECISION MAKING  Pertinent Labs and Imaging studies that were ordered and reviewed are noted above.  Results were reviewed/discussed with the patient and they were also made aware of online assess.  Pt also made aware that some labs, such as cultures, will not be resulted during ER visit and follow up with PMD is necessary.       PROGRESS AND CONSULTS    Progress Notes:    2104  After reviewing the patient's GFR, I ordered CTA Chest and IV fluids for hydration.     2118  Reviewed pt's history and workup with Dr. Dueñas.  After a bedside evaluation; Dr Dueñas agrees with the plan of care.     2257  Patient rechecked.  Discussed  "normal cardiac workup, low risk factors, and no findings suggestive of PE.  Discussed that he needs close follow up with cardiology for outpatient stress test.  The patient's history, physical exam, and lab findings were discussed with the physician, who also performed a face to face history and physical exam. Will give the patient f/u with  (Cardiology) for outpatient stress test. I discussed all results and noted any abnormalities with patient.  Discussed absoute need to recheck abnormalities with their family physician.  I answered any of the patient's questions.  Discussed plan for discharge, as there is no emergent indication for admission.  Pt is agreeable and understands need for follow up and repeat testing.  Pt is aware that discharge does not mean that nothing is wrong but it indicates no emergency is present and they must continue care with their family physician.  Pt is discharged with instructions to follow up with primary care doctor to have their blood pressure rechecked.       MEDICATIONS GIVEN IN ER  Medications   sodium chloride 0.9 % flush 10 mL (not administered)   sodium chloride 0.9 % bolus 1,000 mL (1,000 mL Intravenous New Bag 10/17/17 2108)   aspirin tablet 325 mg (325 mg Oral Given 10/17/17 2108)   iopamidol (ISOVUE-370) 76 % injection 100 mL (95 mL Intravenous Given 10/17/17 2141)       /85  Pulse 70  Temp 98.1 °F (36.7 °C) (Tympanic)   Resp 16  Ht 73\" (185.4 cm)  Wt 220 lb (99.8 kg)  SpO2 100%  BMI 29.03 kg/m2      DIAGNOSIS  Final diagnoses:   Atypical chest pain       FOLLOW UP   Galileo Urbina MD  [address] [facility] [address] [facility] [address]  [place] [id number] [phone]          Tavo Montoya MD  [address]  [place] [address]  [phone]            RX     Medication List      Notice     No changes were made to your prescriptions during this visit.        Documentation assistance provided by dallas Restrepo for Viky Arteaga PA-C.  " Information recorded by the scribe was done at my direction and has been verified and validated by me.  Electronically signed by Viky Arteaga PA-C on 10/17/2017 at time 11:00 PM         David Restrepo  10/17/17 [id number]       Viky Arteaga PA-C  10/17/17 [id number]

## 2017-10-19 ENCOUNTER — TELEPHONE (OUTPATIENT)
Dept: SOCIAL WORK | Facility: HOSPITAL | Age: 57
End: 2017-10-19

## 2017-10-20 ENCOUNTER — OFFICE VISIT (OUTPATIENT)
Dept: CARDIOLOGY | Facility: CLINIC | Age: 57
End: 2017-10-20

## 2017-10-20 VITALS
WEIGHT: 222 LBS | HEART RATE: 85 BPM | DIASTOLIC BLOOD PRESSURE: 80 MMHG | HEIGHT: 73 IN | SYSTOLIC BLOOD PRESSURE: 104 MMHG | BODY MASS INDEX: 29.42 KG/M2

## 2017-10-20 DIAGNOSIS — I10 BENIGN ESSENTIAL HYPERTENSION: Primary | ICD-10-CM

## 2017-10-20 DIAGNOSIS — R07.89 OTHER CHEST PAIN: ICD-10-CM

## 2017-10-20 DIAGNOSIS — IMO0002 MASS: ICD-10-CM

## 2017-10-20 PROCEDURE — 99203 OFFICE O/P NEW LOW 30 MIN: CPT | Performed by: NURSE PRACTITIONER

## 2017-10-20 NOTE — PROGRESS NOTES
Date of Office Visit: 10/20/2017  Encounter Provider: BELLO Velazquez  Place of Service: [facility] CARDIOLOG[facility]  Patient Name: Kenrick Webster  :1960    Chief Complaint   Patient presents with   • Chest Pain   :     HPI: Kenrick Webster is a 56 y.o. male comes in today for for follow-up from an ER visit.  He is a new patient to the practice.  He has a history of DVT, hyperlipidemia, hypertension and renal insufficiency    He's been to urgent care to the emergency room 3 times in the past month.  The first 2 times were for knee pain.    On , he came to the emergency room for chest pain.  He had a history of DVT and has previously been on warfarin.  He developed constant, nonradiating, left of the sternum chest pain.  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  His pain was exacerbated with palpitation and alleviated with no known factors.  Denies any shortness of breath or cough.  There was a palpable deep knot to the left of the sternum.  CTA was negative for PE.    Today, the patient comes in for followup from his emergency room visit.  He reports that the area where he was experiencing his pain felt like someone had hit from the outside and left a bruise.  It was very painful.  It had no associated symptoms.  Movement made the pain worse.  Certain turning made the pain worse.  It came on at rest while driving.  He was not doing strenuous activity.  He had not done any strenuous activity prior to this coming on.  He felt no palpitations or shortness of breath.  He has had no edema, syncope or presyncope.  He did hurt his knee a couple of weeks ago.  He says that today the pain is almost nonexistent whereas before he felt like he could not even touch the outside of his chest but now he can.          Past Medical History:   Diagnosis Date   • Anxiety and depression    • Back pain    • Benign essential hypertension    • Bursitis    • Chest pain    • DVT (deep  "venous thrombosis)    • H/O complete eye exam scheduled   • Hyperlipidemia    • Renal insufficiency    • Vitamin D deficiency        Past Surgical History:   Procedure Laterality Date   • COLONOSCOPY     • ENDOSCOPY     • OTHER SURGICAL HISTORY      gallbladder testing or surgery           Review of Systems   Constitution: Negative for fever and malaise/fatigue.   HENT: Negative for ear pain, hearing loss, nosebleeds and sore throat.    Eyes: Negative for double vision, pain, vision loss in left eye, vision loss in right eye and visual disturbance.   Cardiovascular: Negative for claudication and leg swelling.   Respiratory: Negative for cough, snoring and wheezing.    Endocrine: Negative for cold intolerance, heat intolerance and polyuria.   Skin: Negative for color change, itching and rash.   Musculoskeletal: Negative for joint pain, joint swelling and muscle cramps.   Gastrointestinal: Negative for abdominal pain, diarrhea, melena, nausea and vomiting.   Genitourinary: Negative for bladder incontinence and hematuria.   Neurological: Negative for excessive daytime sleepiness, dizziness, light-headedness, paresthesias and seizures.   Psychiatric/Behavioral: Negative for depression. The patient is not nervous/anxious.    All other systems reviewed and are negative.    All other systems reviewed and are negative    No Known Allergies    All aspects of family and social history reviewed.          Objective:     Vitals:    10/20/17 1058   BP: 104/80   BP Location: Left arm   Pulse: 85   Weight: 222 lb (101 kg)   Height: 73\" (185.4 cm)     Body mass index is 29.29 kg/(m^2).    PHYSICAL EXAM:  Physical Exam   Constitutional: He is oriented to person, place, and time. He appears well-developed and well-nourished.   HENT:   Head: Normocephalic and atraumatic.   Neck: Neck supple. No JVD present.   Cardiovascular: Normal rate, regular rhythm, normal heart sounds and intact distal pulses.    Pulses:       Carotid pulses are " 2+ on the right side, and 2+ on the left side.       Radial pulses are 2+ on the right side, and 2+ on the left side.        Dorsalis pedis pulses are 2+ on the right side, and 2+ on the left side.   Pulmonary/Chest: Effort normal and breath sounds normal. No accessory muscle usage. No respiratory distress. He has no rales.   Palpable  Knot on sternum.    Abdominal: Soft. Normal appearance and bowel sounds are normal. There is no tenderness.   Musculoskeletal: Normal range of motion. He exhibits no edema.   Neurological: He is alert and oriented to person, place, and time.   Skin: Skin is warm, dry and intact. He is not diaphoretic.   Psychiatric: He has a normal mood and affect. His speech is normal and behavior is normal. Judgment and thought content normal. Cognition and memory are normal.       Procedures        Assessment:       Diagnosis Plan   1. Benign essential hypertension  Adult Transthoracic Echo Complete W/ Cont if Necessary Per Protocol   2. Other chest pain     3. Mass          Orders Placed This Encounter   Procedures   • Adult Transthoracic Echo Complete W/ Cont if Necessary Per Protocol     Standing Status:   Future     Order Specific Question:   Reason for exam?     Answer:   Chest Pain     Order Specific Question:   Chest pain specification?     Answer:   Atypical Chest Pain       Current Outpatient Prescriptions   Medication Sig Dispense Refill   • amLODIPine (NORVASC) 10 MG tablet Take 1 tablet by mouth Daily. 90 tablet 1   • atorvastatin (LIPITOR) 10 MG tablet Take 1 tablet by mouth Daily. 90 tablet 1   • azelastine (ASTELIN) 0.1 % nasal spray 2 sprays into each nostril 2 (Two) Times a Day. Use in each nostril as directed     • Diclofenac Sodium (PENNSAID) 2 % solution Place 2 sprays on the skin 2 (Two) Times a Day.     • fluticasone (FLONASE) 50 MCG/ACT nasal spray 2 sprays into each nostril Daily. Administer 2 sprays in each nostril for each dose. 1 each 11   • losartan (COZAAR) 50 MG tablet  Take 1 tablet by mouth Daily. 90 tablet 1   • mometasone-formoterol (DULERA 200) 200-5 MCG/ACT inhaler Inhale 2 puffs 2 (Two) Times a Day.     • montelukast (SINGULAIR) 10 MG tablet Take 1 tablet by mouth Every Night. 30 tablet 11   • omeprazole (priLOSEC) 20 MG capsule Take 40 mg by mouth Daily.     • pantoprazole (PROTONIX) 40 MG EC tablet Take 40 mg by mouth Daily.     • cholecalciferol (VITAMIN D3) 1000 UNITS tablet Take 2,000 Units by mouth Daily.       No current facility-administered medications for this visit.             Plan:         1. Patient comes in with complaints of chest pain.  This is noncardiac related.  He has a palpable mass on top of his sternum.  This is the size of a pea.  It hurts with palpation.  He reports that this was bigger and more painful when he went to the emergency room.  This could be a sebaceous cyst or a lymph node.  I advised followup with his primary care provider.    2. Hypertension; he has a history of hypertension.  His blood pressure is controlled today.  He does have some nonspecific EKG changes which I think is just an incidental finding from him being here today.  I have discussed the case with Dr. Ovalle and will order an echocardiogram just for evaluation.            Follow up in office to be determined after echo    As always, it has been a pleasure to participate in this patient's care.      Sincerely,      BELLO Velazquez

## 2017-10-25 ENCOUNTER — OFFICE VISIT (OUTPATIENT)
Dept: FAMILY MEDICINE CLINIC | Facility: CLINIC | Age: 57
End: 2017-10-25

## 2017-10-25 VITALS
HEIGHT: 72 IN | HEART RATE: 89 BPM | BODY MASS INDEX: 30.07 KG/M2 | WEIGHT: 222 LBS | DIASTOLIC BLOOD PRESSURE: 76 MMHG | SYSTOLIC BLOOD PRESSURE: 113 MMHG | RESPIRATION RATE: 16 BRPM | TEMPERATURE: 98.7 F

## 2017-10-25 DIAGNOSIS — R07.9 CHEST PAIN, UNSPECIFIED TYPE: Primary | ICD-10-CM

## 2017-10-25 PROCEDURE — 99213 OFFICE O/P EST LOW 20 MIN: CPT | Performed by: FAMILY MEDICINE

## 2017-10-25 NOTE — PROGRESS NOTES
Subjective   Kenrick Webster is a 56 y.o. male.     CC: [facility] F/U for chest Pain    History of Present Illness     Pt returns today after trip to the hospital on 10/17 for CP. That visit was as follows:    HPI:  Pt is a 56 y.o. 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 The patient explains that the pain is exacerbated with palpation and is alleviated with no known factors. He denies any associated dyspnea, cough, diaphoresis, nausea or vomiting since onset. Patient denies having any previous cardiac workup in the past and he has never seen a Cardiologist. He notes that he has h/o HTN and has family h/o CAD. No other complaints at this time.    HEARTSCORE     History                       Highly suspicious                                                    2                                              Moderately suspicious                                  1                                              Slightly or non-suspicious                             0     ECG                                    Significant ST depression                             2                                              Nonspecific repol disturbance            1                                              Normal                                                                               0     Age                                      > or = 65                                                                  2                                               46-65                                                                                1                                              < or = 45                                                                  0     Risk factors               (hypercholesterolemia, HTN, DM, smoking, pos fam hx,  obesity)                             > or = to 3 RF for atherosclerotic dx   2                                              1 or 2                                                                                            1                                              No risk factors                                                                    0     Troponin                    > or = 3x normal limit                                               2                                              1-3x normal limit                                                                1                                              < or = Normal limit                                                             0     Score  0 - 3 is low risk     This patient's HEART score is 3                     Patient rechecked.  Discussed normal cardiac workup, low risk factors, and no findings suggestive of PE.  Discussed that he needs close follow up with cardiology for outpatient stress test.  The patient's history, physical exam, and lab findings were discussed with the physician, who also performed a face to face history and physical exam. Will give the patient f/u with  (Cardiology) for outpatient stress test. I discussed all results and noted any abnormalities with patient.  Discussed absoute need to recheck abnormalities with their family physician.  I answered any of the patient's questions.  Discussed plan for discharge, as there is no emergent indication for admission.  Pt is agreeable and understands need for follow up and repeat testing.  Pt is aware that discharge does not mean that nothing is wrong but it indicates no emergency is present and they must continue care with their family physician.  Pt is discharged with instructions to follow up with primary care doctor to have their blood pressure rechecked.     DIAGNOSIS  Final diagnoses:   Atypical chest pain     He is feeling back to normal now w/o  "issues.      Current medication list is compared to recent hospital d/c and the medications are reconciled.    The following portions of the patient's history were reviewed and updated as appropriate: allergies, current medications, past family history, past medical history, past social history, past surgical history and problem list.    Review of Systems   Constitutional: Negative for activity change, chills, fatigue and fever.   Respiratory: Negative for cough and shortness of breath.    Cardiovascular: Negative for chest pain and palpitations.   Gastrointestinal: Negative for abdominal pain.   Endocrine: Negative for cold intolerance.   Psychiatric/Behavioral: Negative for behavioral problems and dysphoric mood. The patient is not nervous/anxious.      /76  Pulse 89  Temp 98.7 °F (37.1 °C) (Oral)   Resp 16  Ht 72\" (182.9 cm)  Wt 222 lb (101 kg)  BMI 30.11 kg/m2    Objective   Physical Exam   Constitutional: He appears well-developed and well-nourished.   Neck: Neck supple. No thyromegaly present.   Cardiovascular: Normal rate and regular rhythm.    No murmur heard.  Pulmonary/Chest: Effort normal and breath sounds normal.   Abdominal: Bowel sounds are normal.   Psychiatric: He has a normal mood and affect. His behavior is normal.   Nursing note and vitals reviewed.  Hospital records reviewed with pt confirming HPI.      Assessment/Plan   Kenrick was seen today for chest pain.    Diagnoses and all orders for this visit:    Chest pain, unspecified type  -     Ambulatory Referral to Cardiology      Pt has actually already gone to cardiology but, due to healing from his knee surgery, they were not able to test him yet and are going to have him back and do a stress test in the future.           "

## 2017-11-07 ENCOUNTER — TELEPHONE (OUTPATIENT)
Dept: CARDIOLOGY | Facility: CLINIC | Age: 57
End: 2017-11-07

## 2017-11-07 ENCOUNTER — HOSPITAL ENCOUNTER (OUTPATIENT)
Dept: CARDIOLOGY | Facility: HOSPITAL | Age: 57
Discharge: HOME | End: 2017-11-07
Admitting: NURSE PRACTITIONER

## 2017-11-07 VITALS
HEART RATE: 86 BPM | HEIGHT: 72 IN | DIASTOLIC BLOOD PRESSURE: 70 MMHG | BODY MASS INDEX: 30.07 KG/M2 | SYSTOLIC BLOOD PRESSURE: 140 MMHG | WEIGHT: 222 LBS

## 2017-11-07 DIAGNOSIS — I10 BENIGN ESSENTIAL HYPERTENSION: ICD-10-CM

## 2017-11-07 LAB
ASCENDING AORTA: 3.2 CM
BH CV ECHO MEAS - ACS: 2.1 CM
BH CV ECHO MEAS - AO MAX PG (FULL): 7.2 MMHG
BH CV ECHO MEAS - AO MAX PG: 13.4 MMHG
BH CV ECHO MEAS - AO MEAN PG (FULL): 2.2 MMHG
BH CV ECHO MEAS - AO MEAN PG: 5.8 MMHG
BH CV ECHO MEAS - AO ROOT AREA (BSA CORRECTED): 1.7
BH CV ECHO MEAS - AO ROOT AREA: 11.5 CM^2
BH CV ECHO MEAS - AO ROOT DIAM: 3.8 CM
BH CV ECHO MEAS - AO V2 MAX: 183.2 CM/SEC
BH CV ECHO MEAS - AO V2 MEAN: 112.1 CM/SEC
BH CV ECHO MEAS - AO V2 VTI: 34.7 CM
BH CV ECHO MEAS - AVA(I,A): 2 CM^2
BH CV ECHO MEAS - AVA(I,D): 2 CM^2
BH CV ECHO MEAS - AVA(V,A): 2.2 CM^2
BH CV ECHO MEAS - AVA(V,D): 2.2 CM^2
BH CV ECHO MEAS - BSA(HAYCOCK): 2.3 M^2
BH CV ECHO MEAS - BSA: 2.2 M^2
BH CV ECHO MEAS - BZI_BMI: 30.1 KILOGRAMS/M^2
BH CV ECHO MEAS - BZI_METRIC_HEIGHT: 182.9 CM
BH CV ECHO MEAS - BZI_METRIC_WEIGHT: 100.7 KG
BH CV ECHO MEAS - CONTRAST EF (2CH): 66.7 ML/M^2
BH CV ECHO MEAS - CONTRAST EF 4CH: 66.3 ML/M^2
BH CV ECHO MEAS - EDV(MOD-SP2): 99 ML
BH CV ECHO MEAS - EDV(MOD-SP4): 83 ML
BH CV ECHO MEAS - EDV(TEICH): 122.1 ML
BH CV ECHO MEAS - EF(CUBED): 73.3 %
BH CV ECHO MEAS - EF(MOD-SP2): 66.7 %
BH CV ECHO MEAS - EF(MOD-SP4): 67 %
BH CV ECHO MEAS - EF(TEICH): 64.8 %
BH CV ECHO MEAS - ESV(MOD-SP2): 33 ML
BH CV ECHO MEAS - ESV(MOD-SP4): 28 ML
BH CV ECHO MEAS - ESV(TEICH): 43 ML
BH CV ECHO MEAS - FS: 35.6 %
BH CV ECHO MEAS - IVS/LVPW: 0.78
BH CV ECHO MEAS - IVSD: 0.81 CM
BH CV ECHO MEAS - LAT PEAK E' VEL: 4 CM/SEC
BH CV ECHO MEAS - LV DIASTOLIC VOL/BSA (35-75): 37.3 ML/M^2
BH CV ECHO MEAS - LV MASS(C)D: 167.1 GRAMS
BH CV ECHO MEAS - LV MASS(C)DI: 75.1 GRAMS/M^2
BH CV ECHO MEAS - LV MAX PG: 6.3 MMHG
BH CV ECHO MEAS - LV MEAN PG: 3.6 MMHG
BH CV ECHO MEAS - LV SYSTOLIC VOL/BSA (12-30): 12.6 ML/M^2
BH CV ECHO MEAS - LV V1 MAX: 125.2 CM/SEC
BH CV ECHO MEAS - LV V1 MEAN: 88.2 CM/SEC
BH CV ECHO MEAS - LV V1 VTI: 21.7 CM
BH CV ECHO MEAS - LVIDD: 5.1 CM
BH CV ECHO MEAS - LVIDS: 3.3 CM
BH CV ECHO MEAS - LVLD AP2: 7.7 CM
BH CV ECHO MEAS - LVLD AP4: 7.6 CM
BH CV ECHO MEAS - LVLS AP2: 6.5 CM
BH CV ECHO MEAS - LVLS AP4: 6.7 CM
BH CV ECHO MEAS - LVOT AREA (M): 3.1 CM^2
BH CV ECHO MEAS - LVOT AREA: 3.2 CM^2
BH CV ECHO MEAS - LVOT DIAM: 2 CM
BH CV ECHO MEAS - LVPWD: 1 CM
BH CV ECHO MEAS - MED PEAK E' VEL: 6 CM/SEC
BH CV ECHO MEAS - MV A DUR: 0.1 SEC
BH CV ECHO MEAS - MV A MAX VEL: 71.3 CM/SEC
BH CV ECHO MEAS - MV DEC SLOPE: 249.8 CM/SEC^2
BH CV ECHO MEAS - MV DEC TIME: 0.2 SEC
BH CV ECHO MEAS - MV E MAX VEL: 50.4 CM/SEC
BH CV ECHO MEAS - MV E/A: 0.71
BH CV ECHO MEAS - MV MAX PG: 2.7 MMHG
BH CV ECHO MEAS - MV MEAN PG: 1.1 MMHG
BH CV ECHO MEAS - MV P1/2T MAX VEL: 52.4 CM/SEC
BH CV ECHO MEAS - MV P1/2T: 61.4 MSEC
BH CV ECHO MEAS - MV V2 MAX: 82.8 CM/SEC
BH CV ECHO MEAS - MV V2 MEAN: 48.9 CM/SEC
BH CV ECHO MEAS - MV V2 VTI: 33.8 CM
BH CV ECHO MEAS - MVA P1/2T LCG: 4.2 CM^2
BH CV ECHO MEAS - MVA(P1/2T): 3.6 CM^2
BH CV ECHO MEAS - MVA(VTI): 2.1 CM^2
BH CV ECHO MEAS - PA ACC TIME: 0.13 SEC
BH CV ECHO MEAS - PA MAX PG (FULL): 4.2 MMHG
BH CV ECHO MEAS - PA MAX PG: 5.6 MMHG
BH CV ECHO MEAS - PA PR(ACCEL): 18.8 MMHG
BH CV ECHO MEAS - PA V2 MAX: 118.1 CM/SEC
BH CV ECHO MEAS - PULM A REVS DUR: 0.09 SEC
BH CV ECHO MEAS - PULM A REVS VEL: 22.4 CM/SEC
BH CV ECHO MEAS - PULM DIAS VEL: 40.2 CM/SEC
BH CV ECHO MEAS - PULM S/D: 1.5
BH CV ECHO MEAS - PULM SYS VEL: 61.7 CM/SEC
BH CV ECHO MEAS - PVA(V,A): 1.9 CM^2
BH CV ECHO MEAS - PVA(V,D): 1.9 CM^2
BH CV ECHO MEAS - QP/QS: 0.67
BH CV ECHO MEAS - RV MAX PG: 1.4 MMHG
BH CV ECHO MEAS - RV MEAN PG: 0.74 MMHG
BH CV ECHO MEAS - RV V1 MAX: 59.7 CM/SEC
BH CV ECHO MEAS - RV V1 MEAN: 40.6 CM/SEC
BH CV ECHO MEAS - RV V1 VTI: 12.9 CM
BH CV ECHO MEAS - RVOT AREA: 3.7 CM^2
BH CV ECHO MEAS - RVOT DIAM: 2.2 CM
BH CV ECHO MEAS - SI(AO): 179.2 ML/M^2
BH CV ECHO MEAS - SI(CUBED): 42.9 ML/M^2
BH CV ECHO MEAS - SI(LVOT): 31.6 ML/M^2
BH CV ECHO MEAS - SI(MOD-SP2): 29.6 ML/M^2
BH CV ECHO MEAS - SI(MOD-SP4): 24.7 ML/M^2
BH CV ECHO MEAS - SI(TEICH): 35.5 ML/M^2
BH CV ECHO MEAS - SUP REN AO DIAM: 1.9 CM
BH CV ECHO MEAS - SV(AO): 399 ML
BH CV ECHO MEAS - SV(CUBED): 95.5 ML
BH CV ECHO MEAS - SV(LVOT): 70.3 ML
BH CV ECHO MEAS - SV(MOD-SP2): 66 ML
BH CV ECHO MEAS - SV(MOD-SP4): 55 ML
BH CV ECHO MEAS - SV(RVOT): 47.2 ML
BH CV ECHO MEAS - SV(TEICH): 79.1 ML
BH CV ECHO MEAS - TAPSE (>1.6): 1.9 CM2
BH CV ECHO MEAS - TR MAX VEL: 282.3 CM/SEC
BH CV XLRA - RV BASE: 3.4 CM
BH CV XLRA - TDI S': 13 CM/SEC
E/E' RATIO: 5
LEFT ATRIUM VOLUME INDEX: 13 ML/M2
LV EF 2D ECHO EST: 67 %
MAXIMAL PREDICTED HEART RATE: 166 BPM
SINUS: 3 CM
STJ: 2.9 CM
STRESS TARGET HR: 141 BPM

## 2017-11-07 PROCEDURE — 93306 TTE W/DOPPLER COMPLETE: CPT

## 2017-11-07 PROCEDURE — 93306 TTE W/DOPPLER COMPLETE: CPT | Performed by: INTERNAL MEDICINE

## 2017-11-21 NOTE — TELEPHONE ENCOUNTER
Patient returned the call.  Would you like me to inform him the result was normal or would you prefer to talk with him?      Patient's phone number is [phone]/ PETER

## 2017-12-21 ENCOUNTER — TELEPHONE (OUTPATIENT)
Dept: CARDIOLOGY | Facility: CLINIC | Age: 57
End: 2017-12-21

## 2017-12-21 NOTE — TELEPHONE ENCOUNTER
I returned his phone call.  He is not in the office.  The office staff said they will call him and give him my phone number if he wants to discuss.

## 2017-12-21 NOTE — TELEPHONE ENCOUNTER
I spoke to Dr. Galindo and explained the findings on the recent office visit.  He does not feel the patient needs a stress test.  The patient is not complaining of chest pain any longer.

## 2017-12-21 NOTE — TELEPHONE ENCOUNTER
12/21/17  Dr Tolis Siimon called - pt in his ofc.  Dr. Galindo asked that I fx a copy of Ansley's ofc. Note, which I did.  He already had a copy of the echo.      Dr. Simon called back, asking to schedule a stress test. He had asked Viky, the  why a stress test had not been ordered.  She explained that she only schedules the appts and did not know why it was not ordered.  So he apparently decided not to schedule the stress test.    From what I read in her note, the pain seemed muscle/skeletal, with palpation of a small knot on the chest.     Will you please review (Ansley states she reviewed with you) and call Dr. Galindo?  Ofc ph [phone]/adilene    He was to follow up with Dr. Montoya from a previous ER visit, but I don't see that he ever did.

## 2017-12-24 DIAGNOSIS — K21.9 GASTROESOPHAGEAL REFLUX DISEASE WITHOUT ESOPHAGITIS: ICD-10-CM

## 2017-12-26 RX ORDER — OMEPRAZOLE 40 MG/1
CAPSULE, DELAYED RELEASE ORAL
Qty: 90 CAPSULE | Refills: 1 | Status: SHIPPED | OUTPATIENT
Start: 2017-12-26 | End: 2018-07-10 | Stop reason: SDUPTHER

## 2018-01-31 DIAGNOSIS — E78.2 MIXED HYPERLIPIDEMIA: ICD-10-CM

## 2018-01-31 DIAGNOSIS — I10 BENIGN ESSENTIAL HYPERTENSION: ICD-10-CM

## 2018-01-31 RX ORDER — LOSARTAN POTASSIUM 50 MG/1
TABLET ORAL
Qty: 90 TABLET | Refills: 1 | OUTPATIENT
Start: 2018-01-31

## 2018-01-31 RX ORDER — ATORVASTATIN CALCIUM 10 MG/1
TABLET, FILM COATED ORAL
Qty: 90 TABLET | Refills: 1 | OUTPATIENT
Start: 2018-01-31

## 2018-02-12 ENCOUNTER — OFFICE VISIT (OUTPATIENT)
Dept: FAMILY MEDICINE CLINIC | Facility: CLINIC | Age: 58
End: 2018-02-12

## 2018-02-12 VITALS
TEMPERATURE: 97.4 F | RESPIRATION RATE: 20 BRPM | WEIGHT: 223 LBS | DIASTOLIC BLOOD PRESSURE: 76 MMHG | HEIGHT: 72 IN | BODY MASS INDEX: 30.2 KG/M2 | SYSTOLIC BLOOD PRESSURE: 124 MMHG | HEART RATE: 80 BPM

## 2018-02-12 DIAGNOSIS — G47.69 NOCTURNAL MYOCLONUS: Primary | ICD-10-CM

## 2018-02-12 LAB
ALBUMIN SERPL-MCNC: 4.3 G/DL (ref 3.5–5.2)
ALBUMIN/GLOB SERPL: 1.7 G/DL
ALP SERPL-CCNC: 70 U/L (ref 39–117)
ALT SERPL-CCNC: 27 U/L (ref 1–41)
AST SERPL-CCNC: 29 U/L (ref 1–40)
BASOPHILS # BLD AUTO: 0.03 10*3/MM3 (ref 0–0.2)
BASOPHILS NFR BLD AUTO: 0.5 % (ref 0–1.5)
BILIRUB SERPL-MCNC: 0.9 MG/DL (ref 0.1–1.2)
BUN SERPL-MCNC: 9 MG/DL (ref 6–20)
BUN/CREAT SERPL: 6.2 (ref 7–25)
CALCIUM SERPL-MCNC: 9.4 MG/DL (ref 8.6–10.5)
CHLORIDE SERPL-SCNC: 99 MMOL/L (ref 98–107)
CO2 SERPL-SCNC: 29.6 MMOL/L (ref 22–29)
CREAT SERPL-MCNC: 1.45 MG/DL (ref 0.76–1.27)
EOSINOPHIL # BLD AUTO: 0.26 10*3/MM3 (ref 0–0.7)
EOSINOPHIL NFR BLD AUTO: 4 % (ref 0.3–6.2)
ERYTHROCYTE [DISTWIDTH] IN BLOOD BY AUTOMATED COUNT: 13.2 % (ref 11.5–14.5)
GFR SERPLBLD CREATININE-BSD FMLA CKD-EPI: 51 ML/MIN/1.73
GFR SERPLBLD CREATININE-BSD FMLA CKD-EPI: 61 ML/MIN/1.73
GLOBULIN SER CALC-MCNC: 2.6 GM/DL
GLUCOSE SERPL-MCNC: 85 MG/DL (ref 65–99)
HCT VFR BLD AUTO: 46.4 % (ref 40.4–52.2)
HGB BLD-MCNC: 15.2 G/DL (ref 13.7–17.6)
IMM GRANULOCYTES # BLD: 0 10*3/MM3 (ref 0–0.03)
IMM GRANULOCYTES NFR BLD: 0 % (ref 0–0.5)
LYMPHOCYTES # BLD AUTO: 2.18 10*3/MM3 (ref 0.9–4.8)
LYMPHOCYTES NFR BLD AUTO: 33.9 % (ref 19.6–45.3)
MAGNESIUM SERPL-MCNC: 2.1 MG/DL (ref 1.6–2.6)
MCH RBC QN AUTO: 29.1 PG (ref 27–32.7)
MCHC RBC AUTO-ENTMCNC: 32.8 G/DL (ref 32.6–36.4)
MCV RBC AUTO: 88.7 FL (ref 79.8–96.2)
MONOCYTES # BLD AUTO: 0.78 10*3/MM3 (ref 0.2–1.2)
MONOCYTES NFR BLD AUTO: 12.1 % (ref 5–12)
NEUTROPHILS # BLD AUTO: 3.18 10*3/MM3 (ref 1.9–8.1)
NEUTROPHILS NFR BLD AUTO: 49.5 % (ref 42.7–76)
PLATELET # BLD AUTO: 296 10*3/MM3 (ref 140–500)
POTASSIUM SERPL-SCNC: 4.1 MMOL/L (ref 3.5–5.2)
PROT SERPL-MCNC: 6.9 G/DL (ref 6–8.5)
RBC # BLD AUTO: 5.23 10*6/MM3 (ref 4.6–6)
SODIUM SERPL-SCNC: 140 MMOL/L (ref 136–145)
WBC # BLD AUTO: 6.43 10*3/MM3 (ref 4.5–10.7)

## 2018-02-12 PROCEDURE — 99213 OFFICE O/P EST LOW 20 MIN: CPT | Performed by: FAMILY MEDICINE

## 2018-02-12 NOTE — PROGRESS NOTES
"Subjective   Kenrick Webster is a 57 y.o. male.     CC: Leg Pain    History of Present Illness     Pt comes in today c/o right LE pain. Reports as \"cramps\" and is right lateral Popliteal region for about 1 year. Happens at night in bed and gets better when gets up and walks. This is the same side he had his DVT yet this different. Drinks plenty of water.      The following portions of the patient's history were reviewed and updated as appropriate: allergies, current medications, past family history, past medical history, past social history, past surgical history and problem list.    Review of Systems   Constitutional: Negative for activity change, chills, fatigue and fever.   Respiratory: Negative for cough and shortness of breath.    Cardiovascular: Negative for chest pain and palpitations.   Gastrointestinal: Negative for abdominal pain.   Endocrine: Negative for cold intolerance.   Musculoskeletal:        Leg pain   Psychiatric/Behavioral: Negative for behavioral problems and dysphoric mood. The patient is not nervous/anxious.      /76  Pulse 80  Temp 97.4 °F (36.3 °C)  Resp 20  Ht 182.9 cm (72.01\")  Wt 101 kg (223 lb)  BMI 30.24 kg/m2    Objective   Physical Exam   Constitutional: He appears well-developed and well-nourished.   Neck: Neck supple. No thyromegaly present.   Cardiovascular: Normal rate and regular rhythm.    No murmur heard.  Pulses:       Dorsalis pedis pulses are 2+ on the right side, and 2+ on the left side.        Posterior tibial pulses are 2+ on the right side, and 2+ on the left side.   Pulmonary/Chest: Effort normal and breath sounds normal.   Abdominal: Bowel sounds are normal.   Psychiatric: He has a normal mood and affect. His behavior is normal.   Nursing note and vitals reviewed.      Assessment/Plan   Kenrick was seen today for leg pain.    Diagnoses and all orders for this visit:    Nocturnal myoclonus  -     Magnesium  -     Comprehensive Metabolic Panel  -     CBC & " Differential      Offered Mirapex but pt declines at this time.

## 2018-02-19 ENCOUNTER — OFFICE VISIT (OUTPATIENT)
Dept: RETAIL CLINIC | Facility: CLINIC | Age: 58
End: 2018-02-19

## 2018-02-19 VITALS
TEMPERATURE: 98.3 F | RESPIRATION RATE: 18 BRPM | HEART RATE: 88 BPM | OXYGEN SATURATION: 98 % | DIASTOLIC BLOOD PRESSURE: 84 MMHG | SYSTOLIC BLOOD PRESSURE: 112 MMHG

## 2018-02-19 DIAGNOSIS — R53.83 FATIGUE, UNSPECIFIED TYPE: Primary | ICD-10-CM

## 2018-02-19 LAB
EXPIRATION DATE: NORMAL
FLUAV AG NPH QL: NORMAL
FLUBV AG NPH QL: NORMAL
INTERNAL CONTROL: NORMAL
Lab: NORMAL

## 2018-02-19 PROCEDURE — 87804 INFLUENZA ASSAY W/OPTIC: CPT | Performed by: NURSE PRACTITIONER

## 2018-02-19 PROCEDURE — 99213 OFFICE O/P EST LOW 20 MIN: CPT | Performed by: NURSE PRACTITIONER

## 2018-02-19 RX ORDER — PREDNISONE 20 MG/1
20 TABLET ORAL 2 TIMES DAILY
Qty: 10 TABLET | Refills: 0 | Status: SHIPPED | OUTPATIENT
Start: 2018-02-19 | End: 2018-08-29

## 2018-02-19 NOTE — PROGRESS NOTES
Subjective:     Kenrick Webster is a 57 y.o.     Fatigue   This is a new problem. The current episode started in the past 7 days. The problem has been unchanged. Associated symptoms include congestion (baseline mild), coughing (mild baseline), fatigue, headaches and myalgias (twitching). Pertinent negatives include no fever, nausea, rash, sore throat or vomiting. He has tried nothing for the symptoms.         The following portions of the patient's history were reviewed and updated as appropriate: allergies, current medications, past family history, past medical history, past social history, past surgical history and problem list.      Review of Systems   Constitutional: Positive for fatigue. Negative for appetite change (decreased) and fever.   HENT: Positive for congestion (baseline mild). Negative for sinus pressure and sore throat.    Respiratory: Positive for cough (mild baseline).    Cardiovascular:        Hx: hypertension   Gastrointestinal: Negative for diarrhea, nausea and vomiting.   Musculoskeletal: Positive for myalgias (twitching).   Skin: Negative for color change, pallor and rash.   Neurological: Positive for light-headedness and headaches. Negative for dizziness.         Objective:      Physical Exam   Constitutional: He is oriented to person, place, and time. He appears well-developed and well-nourished. He is cooperative.   HENT:   Head: Normocephalic and atraumatic.   Right Ear: Tympanic membrane and ear canal normal.   Left Ear: Tympanic membrane and ear canal normal.   Nose: Nose normal.   Mouth/Throat: No oropharyngeal exudate or posterior oropharyngeal erythema.   Eyes: EOM and lids are normal. Pupils are equal, round, and reactive to light. Right conjunctiva is injected. Left conjunctiva is injected.   Neck: Normal range of motion. Neck supple.   Cardiovascular: Normal rate, regular rhythm, S1 normal, S2 normal and normal heart sounds.    Pulmonary/Chest: Effort normal and breath sounds  normal.   Abdominal: Soft. Bowel sounds are normal. There is no tenderness.   Musculoskeletal: Normal range of motion.   Neurological: He is alert and oriented to person, place, and time.   Skin: Skin is warm and dry.   Psychiatric: He has a normal mood and affect. His speech is normal and behavior is normal. Thought content normal.   Vitals reviewed.          Diagnoses and all orders for this visit:    Fatigue, unspecified type    Other orders  -     predniSONE (DELTASONE) 20 MG tablet; Take 1 tablet by mouth 2 (Two) Times a Day.

## 2018-02-19 NOTE — PATIENT INSTRUCTIONS
Fatigue  Fatigue is feeling tired all of the time, a lack of energy, or a lack of motivation. Occasional or mild fatigue is often a normal response to activity or life in general. However, long-lasting (chronic) or extreme fatigue may indicate an underlying medical condition.  Follow these instructions at home:  Watch your fatigue for any changes. The following actions may help to lessen any discomfort you are feeling:  · Talk to your health care provider about how much sleep you need each night. Try to get the required amount every night.  · Take medicines only as directed by your health care provider.  · Eat a healthy and nutritious diet. Ask your health care provider if you need help changing your diet.  · Drink enough fluid to keep your urine clear or pale yellow.  · Practice ways of relaxing, such as yoga, meditation, massage therapy, or acupuncture.  · Exercise regularly.  · Change situations that cause you stress. Try to keep your work and personal routine reasonable.  · Do not abuse illegal drugs.  · Limit alcohol intake to no more than 1 drink per day for nonpregnant women and 2 drinks per day for men. One drink equals 12 ounces of beer, 5 ounces of wine, or 1½ ounces of hard liquor.  · Take a multivitamin, if directed by your health care provider.  Contact a health care provider if:  · Your fatigue does not get better.  · You have a fever.  · You have unintentional weight loss or gain.  · You have headaches.  · You have difficulty:  ¨ Falling asleep.  ¨ Sleeping throughout the night.  · You feel angry, guilty, anxious, or sad.  · You are unable to have a bowel movement (constipation).  · You skin is dry.  · Your legs or another part of your body is swollen.  Get help right away if:  · You feel confused.  · Your vision is blurry.  · You feel faint or pass out.  · You have a severe headache.  · You have severe abdominal, pelvic, or back pain.  · You have chest pain, shortness of breath, or an irregular or  fast heartbeat.  · You are unable to urinate or you urinate less than normal.  · You develop abnormal bleeding, such as bleeding from the rectum, vagina, nose, lungs, or nipples.  · You vomit blood.  · You have thoughts about harming yourself or committing suicide.  · You are worried that you might harm someone else.  This information is not intended to replace advice given to you by your health care provider. Make sure you discuss any questions you have with your health care provider.  Document Released: 10/14/2008 Document Revised: 05/25/2017 Document Reviewed: 04/21/2015  [employer] Patient Education © 2017 [employer]

## 2018-02-21 ENCOUNTER — OFFICE VISIT (OUTPATIENT)
Dept: FAMILY MEDICINE CLINIC | Facility: CLINIC | Age: 58
End: 2018-02-21

## 2018-02-21 ENCOUNTER — TELEPHONE (OUTPATIENT)
Dept: FAMILY MEDICINE CLINIC | Facility: CLINIC | Age: 58
End: 2018-02-21

## 2018-02-21 VITALS
RESPIRATION RATE: 16 BRPM | WEIGHT: 223 LBS | DIASTOLIC BLOOD PRESSURE: 79 MMHG | HEART RATE: 94 BPM | TEMPERATURE: 97.7 F | SYSTOLIC BLOOD PRESSURE: 129 MMHG | HEIGHT: 72 IN | BODY MASS INDEX: 30.2 KG/M2

## 2018-02-21 DIAGNOSIS — E78.2 MIXED HYPERLIPIDEMIA: ICD-10-CM

## 2018-02-21 DIAGNOSIS — F43.9 SITUATIONAL STRESS: Primary | ICD-10-CM

## 2018-02-21 DIAGNOSIS — I10 BENIGN ESSENTIAL HYPERTENSION: ICD-10-CM

## 2018-02-21 PROCEDURE — 99214 OFFICE O/P EST MOD 30 MIN: CPT | Performed by: FAMILY MEDICINE

## 2018-02-21 RX ORDER — AMLODIPINE BESYLATE 10 MG/1
10 TABLET ORAL DAILY
Qty: 90 TABLET | Refills: 1 | Status: SHIPPED | OUTPATIENT
Start: 2018-02-21 | End: 2018-07-10 | Stop reason: SDUPTHER

## 2018-02-21 RX ORDER — LOSARTAN POTASSIUM 50 MG/1
50 TABLET ORAL DAILY
Qty: 90 TABLET | Refills: 1 | Status: SHIPPED | OUTPATIENT
Start: 2018-02-21 | End: 2018-07-10 | Stop reason: SDUPTHER

## 2018-02-21 RX ORDER — ATORVASTATIN CALCIUM 10 MG/1
10 TABLET, FILM COATED ORAL DAILY
Qty: 90 TABLET | Refills: 1 | Status: SHIPPED | OUTPATIENT
Start: 2018-02-21 | End: 2018-07-10 | Stop reason: SDUPTHER

## 2018-02-21 RX ORDER — BUSPIRONE HYDROCHLORIDE 15 MG/1
TABLET ORAL
Qty: 40 TABLET | Refills: 5 | Status: SHIPPED | OUTPATIENT
Start: 2018-02-21 | End: 2018-07-11

## 2018-02-21 NOTE — PROGRESS NOTES
Subjective   Kenrick Webster is a 57 y.o. male.     History of Present Illness     Chief Complaint:   Chief Complaint   Patient presents with   • Anxiety     x 1 week  - PHQ9 DONE TODAY    • Depression   • Hypertension   • Hyperlipidemia       Kenrick Webster 57 y.o. male who presents today for Medical Management of the below listed issues and medication refills.  he has a problem list of   Patient Active Problem List   Diagnosis   • DVT (deep venous thrombosis)   • Benign essential hypertension   • Renal insufficiency   • Vitamin D deficiency   • Hyperlipidemia   • Gastroesophageal reflux disease without esophagitis   • Moderate persistent asthma without complication   • Other chest pain   • Mass   .  Since the last visit, he has overall felt well with his normal medications, yet has been quite stressed and anxious and somewhat depressed for actually several months and has gotten worse the last week or so due to some family stressors.  he has been compliant with   Current Outpatient Prescriptions:   •  azelastine (ASTELIN) 0.1 % nasal spray, 2 sprays into each nostril 2 (Two) Times a Day. Use in each nostril as directed, Disp: , Rfl:   •  cholecalciferol (VITAMIN D3) 1000 UNITS tablet, Take 2,000 Units by mouth Daily., Disp: , Rfl:   •  Diclofenac Sodium (PENNSAID) 2 % solution, Place 2 sprays on the skin 2 (Two) Times a Day., Disp: , Rfl:   •  fluticasone (FLONASE) 50 MCG/ACT nasal spray, 2 sprays into each nostril Daily. Administer 2 sprays in each nostril for each dose., Disp: 1 each, Rfl: 11  •  mometasone-formoterol (DULERA 200) 200-5 MCG/ACT inhaler, Inhale 2 puffs 2 (Two) Times a Day., Disp: , Rfl:   •  montelukast (SINGULAIR) 10 MG tablet, Take 1 tablet by mouth Every Night., Disp: 30 tablet, Rfl: 11  •  omeprazole (priLOSEC) 40 MG capsule, TAKE 1 CAPSULE BY MOUTH DAILY. YEARLY LIMIT REACHED NOT COVERED, Disp: 90 capsule, Rfl: 1  •  pantoprazole (PROTONIX) 40 MG EC tablet, Take 40 mg by mouth Daily.,  "Disp: , Rfl:   •  predniSONE (DELTASONE) 20 MG tablet, Take 1 tablet by mouth 2 (Two) Times a Day., Disp: 10 tablet, Rfl: 0  •  amLODIPine (NORVASC) 10 MG tablet, Take 1 tablet by mouth Daily., Disp: 90 tablet, Rfl: 1  •  atorvastatin (LIPITOR) 10 MG tablet, Take 1 tablet by mouth Daily., Disp: 90 tablet, Rfl: 1  •  busPIRone (BUSPAR) 15 MG tablet, Take 0.5-1 tab BID prn anxiety, Disp: 40 tablet, Rfl: 5  •  losartan (COZAAR) 50 MG tablet, Take 1 tablet by mouth Daily., Disp: 90 tablet, Rfl: 1.  he denies medication side effects.    All of the chronic condition(s) listed above are stable w/o issues.    /79  Pulse 94  Temp 97.7 °F (36.5 °C) (Oral)   Resp 16  Ht 182.9 cm (72\")  Wt 101 kg (223 lb)  BMI 30.24 kg/m2    Results for orders placed or performed in visit on 02/19/18   POC Influenza A / B   Result Value Ref Range    Rapid Influenza A Ag neg     Rapid Influenza B Ag neg     Internal Control Passed Passed    Lot Number [id number]     Expiration Date 11/08/2020            The following portions of the patient's history were reviewed and updated as appropriate: allergies, current medications, past family history, past medical history, past social history, past surgical history and problem list.    Review of Systems   Constitutional: Negative for activity change, chills, fatigue and fever.   Respiratory: Negative for cough and shortness of breath.    Cardiovascular: Negative for chest pain and palpitations.   Gastrointestinal: Negative for abdominal pain.   Endocrine: Negative for cold intolerance.   Psychiatric/Behavioral: Positive for dysphoric mood. Negative for behavioral problems and suicidal ideas. The patient is nervous/anxious.        Objective   Physical Exam   Constitutional: He appears well-developed and well-nourished.   Neck: Neck supple. No thyromegaly present.   Cardiovascular: Normal rate and regular rhythm.    No murmur heard.  Pulmonary/Chest: Effort normal and breath sounds normal. "   Abdominal: Bowel sounds are normal.   Psychiatric: He has a normal mood and affect. His behavior is normal.   Nursing note and vitals reviewed.      Assessment/Plan   Kenrick was seen today for anxiety, depression, hypertension and hyperlipidemia.    Diagnoses and all orders for this visit:    Situational stress  -     busPIRone (BUSPAR) 15 MG tablet; Take 0.5-1 tab BID prn anxiety    Benign essential hypertension  -     amLODIPine (NORVASC) 10 MG tablet; Take 1 tablet by mouth Daily.  -     losartan (COZAAR) 50 MG tablet; Take 1 tablet by mouth Daily.    Mixed hyperlipidemia  -     atorvastatin (LIPITOR) 10 MG tablet; Take 1 tablet by mouth Daily.

## 2018-02-23 ENCOUNTER — TELEPHONE (OUTPATIENT)
Dept: FAMILY MEDICINE CLINIC | Facility: CLINIC | Age: 58
End: 2018-02-23

## 2018-02-23 NOTE — TELEPHONE ENCOUNTER
Patient calling again for release to work; second call regarding this matter, patient advised there is a message out to you and waiting for response

## 2018-02-26 NOTE — TELEPHONE ENCOUNTER
PATIENT TOLD MAY RETURN TO WORK - PT TO  WORK NOTE 2/19/2018 -2- - MAY RETURN ON TUES PER DR ALEX SANCHEZ MS

## 2018-02-27 ENCOUNTER — OFFICE VISIT (OUTPATIENT)
Dept: FAMILY MEDICINE CLINIC | Facility: CLINIC | Age: 58
End: 2018-02-27

## 2018-02-27 VITALS
RESPIRATION RATE: 18 BRPM | HEIGHT: 72 IN | HEART RATE: 96 BPM | WEIGHT: 226 LBS | SYSTOLIC BLOOD PRESSURE: 128 MMHG | TEMPERATURE: 98.5 F | DIASTOLIC BLOOD PRESSURE: 75 MMHG | BODY MASS INDEX: 30.61 KG/M2

## 2018-02-27 DIAGNOSIS — F43.9 STRESS AT HOME: Primary | ICD-10-CM

## 2018-02-27 PROCEDURE — 99212 OFFICE O/P EST SF 10 MIN: CPT | Performed by: FAMILY MEDICINE

## 2018-02-27 NOTE — PROGRESS NOTES
"Subjective   Kenrick Webster is a 57 y.o. male.     CC: Stress    History of Present Illness     Pt returns today to discuss going back to work. Has been off for a time dealing with family stressors and has a script of the BuSpar yet hasn't used it yet. Feeling some better yet still with some stress issues.    The following portions of the patient's history were reviewed and updated as appropriate: allergies, current medications, past family history, past medical history, past social history, past surgical history and problem list.    Review of Systems   Constitutional: Negative for activity change, chills, fatigue and fever.   Respiratory: Negative for cough and shortness of breath.    Cardiovascular: Negative for chest pain and palpitations.   Gastrointestinal: Negative for abdominal pain.   Endocrine: Negative for cold intolerance.   Psychiatric/Behavioral: Negative for behavioral problems and dysphoric mood. The patient is nervous/anxious (stable).      /75  Pulse 96  Temp 98.5 °F (36.9 °C) (Oral)   Resp 18  Ht 182.9 cm (72\")  Wt 103 kg (226 lb)  BMI 30.65 kg/m2    Objective   Physical Exam   Constitutional: He appears well-developed and well-nourished.   Neck: Neck supple. No thyromegaly present.   Cardiovascular: Normal rate and regular rhythm.    No murmur heard.  Pulmonary/Chest: Effort normal and breath sounds normal.   Abdominal: Bowel sounds are normal.   Psychiatric: He has a normal mood and affect. His behavior is normal.   Nursing note and vitals reviewed.      Assessment/Plan   Kenrick was seen today for stress.    Diagnoses and all orders for this visit:    Stress at home    Pt stable and is to RTW.         "

## 2018-04-02 ENCOUNTER — OFFICE VISIT (OUTPATIENT)
Dept: FAMILY MEDICINE CLINIC | Facility: CLINIC | Age: 58
End: 2018-04-02

## 2018-04-02 VITALS
HEART RATE: 82 BPM | DIASTOLIC BLOOD PRESSURE: 80 MMHG | WEIGHT: 223 LBS | TEMPERATURE: 97.8 F | RESPIRATION RATE: 16 BRPM | SYSTOLIC BLOOD PRESSURE: 121 MMHG | HEIGHT: 73 IN | BODY MASS INDEX: 29.55 KG/M2

## 2018-04-02 DIAGNOSIS — S20.212A CONTUSION OF LEFT CHEST WALL, INITIAL ENCOUNTER: Primary | ICD-10-CM

## 2018-04-02 PROCEDURE — 99213 OFFICE O/P EST LOW 20 MIN: CPT | Performed by: FAMILY MEDICINE

## 2018-04-02 NOTE — PROGRESS NOTES
"Subjective   Kenrick Webster is a 57 y.o. male.     CC: [facility] F/U for Rib Pain    History of Present Illness     Pt returns today after being seen in [facility] on 3/22/18 for slipping on ice, falling down and then c/o left lower rib cage pain. Was seen, xrays done that were negative, and he was sent home with some Voltaren Gel to be rubbed on the site, along with a Ketorolac shot. Pain is slowly better per pt.      The following portions of the patient's history were reviewed and updated as appropriate: allergies, current medications, past family history, past medical history, past social history, past surgical history and problem list.    Review of Systems   Constitutional: Negative for activity change, chills, fatigue and fever.   Respiratory: Negative for cough and shortness of breath.    Cardiovascular: Negative for chest pain and palpitations.        Chest wall tenderness   Gastrointestinal: Negative for abdominal pain.   Endocrine: Negative for cold intolerance.   Psychiatric/Behavioral: Negative for behavioral problems and dysphoric mood. The patient is not nervous/anxious.      /80   Pulse 82   Temp 97.8 °F (36.6 °C) (Oral)   Resp 16   Ht 185.4 cm (73\")   Wt 101 kg (223 lb)   BMI 29.42 kg/m²     Objective   Physical Exam   Constitutional: He appears well-developed and well-nourished.   Neck: Neck supple. No thyromegaly present.   Cardiovascular: Normal rate and regular rhythm.    No murmur heard.  Point-specific tenderness along the lower/anterior-lateral ribs   Pulmonary/Chest: Effort normal and breath sounds normal.   Abdominal: Bowel sounds are normal.   Psychiatric: He has a normal mood and affect. His behavior is normal.   Nursing note and vitals reviewed.  [facility] notes reviewed confirming HPI.    Assessment/Plan   Kenrick was seen today for other.    Diagnoses and all orders for this visit:    Contusion of left chest wall, initial encounter    Deep breaths/coughing to expand lungs discussed. " Topical NSAIDS (due to pt's renal issues) recommended, along with Tylenol prn.

## 2018-07-10 RX ORDER — BUSPIRONE HYDROCHLORIDE 15 MG/1
TABLET ORAL
Qty: 40 TABLET | Refills: 5 | Status: CANCELLED | OUTPATIENT
Start: 2018-07-10

## 2018-07-10 RX ORDER — MONTELUKAST SODIUM 10 MG/1
10 TABLET ORAL NIGHTLY
Qty: 90 TABLET | Refills: 1 | Status: CANCELLED | OUTPATIENT
Start: 2018-07-10

## 2018-07-10 NOTE — PROGRESS NOTES
Subjective   Kenrick Webster is a 57 y.o. male.     History of Present Illness     Chief Complaint:   Chief Complaint   Patient presents with   • Hypertension   • Hyperlipidemia   • Anxiety       Kenrick Webster 57 y.o. male who presents today for Medical Management of the below listed issues and medication refills.  he has a problem list of   Patient Active Problem List   Diagnosis   • DVT (deep venous thrombosis) (CMS/[facility])   • Benign essential hypertension   • Renal insufficiency   • Vitamin D deficiency   • Hyperlipidemia   • Gastroesophageal reflux disease without esophagitis   • Moderate persistent asthma without complication   • Other chest pain   • Mass   • Stress at home   .  Since the last visit, he has overall felt well.  he has been compliant with   Current Outpatient Prescriptions:   •  azelastine (ASTELIN) 0.1 % nasal spray, 2 sprays into each nostril 2 (Two) Times a Day. Use in each nostril as directed, Disp: , Rfl:   •  cholecalciferol (VITAMIN D3) 1000 UNITS tablet, Take 2,000 Units by mouth Daily., Disp: , Rfl:   •  amLODIPine (NORVASC) 10 MG tablet, Take 1 tablet by mouth Daily., Disp: 90 tablet, Rfl: 1  •  atorvastatin (LIPITOR) 10 MG tablet, Take 1 tablet by mouth Daily., Disp: 90 tablet, Rfl: 1  •  Diclofenac Sodium (PENNSAID) 2 % solution, Place 2 sprays on the skin 2 (Two) Times a Day., Disp: , Rfl:   •  fluticasone (FLONASE) 50 MCG/ACT nasal spray, 2 sprays into each nostril Daily. Administer 2 sprays in each nostril for each dose., Disp: 3 bottle, Rfl: 1  •  Ketorolac Tromethamine (TORADOL IJ), Inject  as directed., Disp: , Rfl:   •  losartan (COZAAR) 50 MG tablet, Take 1 tablet by mouth Daily., Disp: 90 tablet, Rfl: 1  •  mometasone-formoterol (DULERA 200) 200-5 MCG/ACT inhaler, Inhale 2 puffs 2 (Two) Times a Day., Disp: , Rfl:   •  omeprazole (priLOSEC) 40 MG capsule, Take 1 capsule by mouth Daily., Disp: 90 capsule, Rfl: 1  •  pantoprazole (PROTONIX) 40 MG EC tablet, Take 40 mg by mouth  "Daily., Disp: , Rfl:   •  predniSONE (DELTASONE) 20 MG tablet, Take 1 tablet by mouth 2 (Two) Times a Day., Disp: 10 tablet, Rfl: 0.  he denies medication side effects.    All of the chronic condition(s) listed above are stable w/o issues.    /84   Pulse 75   Temp 98 °F (36.7 °C)   Resp 16   Ht 185.4 cm (72.99\")   Wt 101 kg (223 lb)   SpO2 98%   BMI 29.43 kg/m²     Results for orders placed or performed in visit on 02/19/18   POC Influenza A / B   Result Value Ref Range    Rapid Influenza A Ag neg     Rapid Influenza B Ag neg     Internal Control Passed Passed    Lot Number [id number]     Expiration Date 11/08/2020            The following portions of the patient's history were reviewed and updated as appropriate: allergies, current medications, past family history, past medical history, past social history, past surgical history and problem list.    Review of Systems   Constitutional: Negative for activity change, chills, fatigue and fever.   Respiratory: Negative for cough and shortness of breath.    Cardiovascular: Negative for chest pain and palpitations.   Gastrointestinal: Negative for abdominal pain.   Endocrine: Negative for cold intolerance.   Psychiatric/Behavioral: Negative for behavioral problems and dysphoric mood. The patient is not nervous/anxious.        Objective   Physical Exam   Constitutional: He appears well-developed and well-nourished.   Neck: Neck supple. No thyromegaly present.   Cardiovascular: Normal rate and regular rhythm.    No murmur heard.  Pulmonary/Chest: Effort normal and breath sounds normal.   Abdominal: Bowel sounds are normal. There is no tenderness.   Psychiatric: He has a normal mood and affect. His behavior is normal.   Nursing note and vitals reviewed.      Assessment/Plan   Kenrick was seen today for hypertension, hyperlipidemia and anxiety.    Diagnoses and all orders for this visit:    Benign essential hypertension  -     amLODIPine (NORVASC) 10 MG tablet; " Take 1 tablet by mouth Daily.  -     losartan (COZAAR) 50 MG tablet; Take 1 tablet by mouth Daily.  -     Lipid Panel  -     Basic Metabolic Panel    Mixed hyperlipidemia  -     atorvastatin (LIPITOR) 10 MG tablet; Take 1 tablet by mouth Daily.  -     Lipid Panel    Chronic seasonal allergic rhinitis due to pollen  -     fluticasone (FLONASE) 50 MCG/ACT nasal spray; 2 sprays into each nostril Daily. Administer 2 sprays in each nostril for each dose.    Gastroesophageal reflux disease without esophagitis  -     omeprazole (priLOSEC) 40 MG capsule; Take 1 capsule by mouth Daily.    Other orders  -     Cancel: busPIRone (BUSPAR) 15 MG tablet; Take 0.5-1 tab BID prn anxiety  -     Cancel: montelukast (SINGULAIR) 10 MG tablet; Take 1 tablet by mouth Every Night.

## 2018-07-11 ENCOUNTER — OFFICE VISIT (OUTPATIENT)
Dept: FAMILY MEDICINE CLINIC | Facility: CLINIC | Age: 58
End: 2018-07-11

## 2018-07-11 VITALS
HEIGHT: 73 IN | WEIGHT: 223 LBS | HEART RATE: 75 BPM | DIASTOLIC BLOOD PRESSURE: 84 MMHG | OXYGEN SATURATION: 98 % | SYSTOLIC BLOOD PRESSURE: 118 MMHG | BODY MASS INDEX: 29.55 KG/M2 | RESPIRATION RATE: 16 BRPM | TEMPERATURE: 98 F

## 2018-07-11 DIAGNOSIS — J30.1 CHRONIC SEASONAL ALLERGIC RHINITIS DUE TO POLLEN: ICD-10-CM

## 2018-07-11 DIAGNOSIS — E78.2 MIXED HYPERLIPIDEMIA: ICD-10-CM

## 2018-07-11 DIAGNOSIS — K21.9 GASTROESOPHAGEAL REFLUX DISEASE WITHOUT ESOPHAGITIS: ICD-10-CM

## 2018-07-11 DIAGNOSIS — I10 BENIGN ESSENTIAL HYPERTENSION: ICD-10-CM

## 2018-07-11 LAB
BUN SERPL-MCNC: 8 MG/DL (ref 6–20)
BUN/CREAT SERPL: 5 (ref 7–25)
CALCIUM SERPL-MCNC: 9.9 MG/DL (ref 8.6–10.5)
CHLORIDE SERPL-SCNC: 103 MMOL/L (ref 98–107)
CHOLEST SERPL-MCNC: 165 MG/DL (ref 0–200)
CO2 SERPL-SCNC: 27.1 MMOL/L (ref 22–29)
CREAT SERPL-MCNC: 1.61 MG/DL (ref 0.76–1.27)
GLUCOSE SERPL-MCNC: 94 MG/DL (ref 65–99)
HDLC SERPL-MCNC: 63 MG/DL (ref 40–60)
LDLC SERPL CALC-MCNC: 91 MG/DL (ref 0–100)
POTASSIUM SERPL-SCNC: 3.6 MMOL/L (ref 3.5–5.2)
SODIUM SERPL-SCNC: 143 MMOL/L (ref 136–145)
TRIGL SERPL-MCNC: 54 MG/DL (ref 0–150)
VLDLC SERPL CALC-MCNC: 10.8 MG/DL (ref 5–40)

## 2018-07-11 PROCEDURE — 99214 OFFICE O/P EST MOD 30 MIN: CPT | Performed by: FAMILY MEDICINE

## 2018-07-11 RX ORDER — ATORVASTATIN CALCIUM 10 MG/1
10 TABLET, FILM COATED ORAL DAILY
Qty: 90 TABLET | Refills: 1 | Status: SHIPPED | OUTPATIENT
Start: 2018-07-11 | End: 2018-12-27 | Stop reason: SDUPTHER

## 2018-07-11 RX ORDER — FLUTICASONE PROPIONATE 50 MCG
2 SPRAY, SUSPENSION (ML) NASAL DAILY
Qty: 3 BOTTLE | Refills: 1 | Status: SHIPPED | OUTPATIENT
Start: 2018-07-11 | End: 2018-12-27 | Stop reason: SDUPTHER

## 2018-07-11 RX ORDER — LOSARTAN POTASSIUM 50 MG/1
50 TABLET ORAL DAILY
Qty: 90 TABLET | Refills: 1 | Status: SHIPPED | OUTPATIENT
Start: 2018-07-11 | End: 2018-12-27 | Stop reason: SDUPTHER

## 2018-07-11 RX ORDER — AMLODIPINE BESYLATE 10 MG/1
10 TABLET ORAL DAILY
Qty: 90 TABLET | Refills: 1 | Status: SHIPPED | OUTPATIENT
Start: 2018-07-11 | End: 2018-12-27 | Stop reason: SDUPTHER

## 2018-07-11 RX ORDER — OMEPRAZOLE 40 MG/1
40 CAPSULE, DELAYED RELEASE ORAL DAILY
Qty: 90 CAPSULE | Refills: 1 | Status: SHIPPED | OUTPATIENT
Start: 2018-07-11 | End: 2018-12-27 | Stop reason: SDUPTHER

## 2018-07-18 ENCOUNTER — OFFICE VISIT (OUTPATIENT)
Dept: FAMILY MEDICINE CLINIC | Facility: CLINIC | Age: 58
End: 2018-07-18

## 2018-07-18 ENCOUNTER — HOSPITAL ENCOUNTER (OUTPATIENT)
Dept: CARDIOLOGY | Facility: HOSPITAL | Age: 58
Discharge: HOME | End: 2018-07-18
Admitting: PHYSICIAN ASSISTANT

## 2018-07-18 VITALS
SYSTOLIC BLOOD PRESSURE: 110 MMHG | RESPIRATION RATE: 16 BRPM | DIASTOLIC BLOOD PRESSURE: 62 MMHG | BODY MASS INDEX: 29.69 KG/M2 | TEMPERATURE: 98.3 F | HEIGHT: 73 IN | HEART RATE: 80 BPM | OXYGEN SATURATION: 98 % | WEIGHT: 224 LBS

## 2018-07-18 DIAGNOSIS — Z86.718 HISTORY OF RECURRENT DEEP VEIN THROMBOSIS (DVT): ICD-10-CM

## 2018-07-18 DIAGNOSIS — M79.604 LEG PAIN, RIGHT: Primary | ICD-10-CM

## 2018-07-18 LAB
BH CV LOWER VASCULAR LEFT COMMON FEMORAL AUGMENT: NORMAL
BH CV LOWER VASCULAR LEFT COMMON FEMORAL COMPETENT: NORMAL
BH CV LOWER VASCULAR LEFT COMMON FEMORAL COMPRESS: NORMAL
BH CV LOWER VASCULAR LEFT COMMON FEMORAL PHASIC: NORMAL
BH CV LOWER VASCULAR LEFT COMMON FEMORAL SPONT: NORMAL
BH CV LOWER VASCULAR RIGHT COMMON FEMORAL AUGMENT: NORMAL
BH CV LOWER VASCULAR RIGHT COMMON FEMORAL COMPETENT: NORMAL
BH CV LOWER VASCULAR RIGHT COMMON FEMORAL COMPRESS: NORMAL
BH CV LOWER VASCULAR RIGHT COMMON FEMORAL PHASIC: NORMAL
BH CV LOWER VASCULAR RIGHT COMMON FEMORAL SPONT: NORMAL
BH CV LOWER VASCULAR RIGHT DISTAL FEMORAL COMPRESS: NORMAL
BH CV LOWER VASCULAR RIGHT GASTRONEMIUS COMPRESS: NORMAL
BH CV LOWER VASCULAR RIGHT GREATER SAPH AK COMPRESS: NORMAL
BH CV LOWER VASCULAR RIGHT GREATER SAPH BK COMPRESS: NORMAL
BH CV LOWER VASCULAR RIGHT LESSER SAPH COMPRESS: NORMAL
BH CV LOWER VASCULAR RIGHT MID FEMORAL AUGMENT: NORMAL
BH CV LOWER VASCULAR RIGHT MID FEMORAL COMPETENT: NORMAL
BH CV LOWER VASCULAR RIGHT MID FEMORAL COMPRESS: NORMAL
BH CV LOWER VASCULAR RIGHT MID FEMORAL PHASIC: NORMAL
BH CV LOWER VASCULAR RIGHT MID FEMORAL SPONT: NORMAL
BH CV LOWER VASCULAR RIGHT PERONEAL COMPRESS: NORMAL
BH CV LOWER VASCULAR RIGHT POPLITEAL AUGMENT: NORMAL
BH CV LOWER VASCULAR RIGHT POPLITEAL COMPETENT: NORMAL
BH CV LOWER VASCULAR RIGHT POPLITEAL COMPRESS: NORMAL
BH CV LOWER VASCULAR RIGHT POPLITEAL PHASIC: NORMAL
BH CV LOWER VASCULAR RIGHT POPLITEAL SPONT: NORMAL
BH CV LOWER VASCULAR RIGHT POSTERIOR TIBIAL COMPRESS: NORMAL
BH CV LOWER VASCULAR RIGHT PROXIMAL FEMORAL COMPRESS: NORMAL
BH CV LOWER VASCULAR RIGHT SAPHENOFEMORAL JUNCTION AUGMENT: NORMAL
BH CV LOWER VASCULAR RIGHT SAPHENOFEMORAL JUNCTION COMPETENT: NORMAL
BH CV LOWER VASCULAR RIGHT SAPHENOFEMORAL JUNCTION COMPRESS: NORMAL
BH CV LOWER VASCULAR RIGHT SAPHENOFEMORAL JUNCTION PHASIC: NORMAL
BH CV LOWER VASCULAR RIGHT SAPHENOFEMORAL JUNCTION SPONT: NORMAL

## 2018-07-18 PROCEDURE — 99213 OFFICE O/P EST LOW 20 MIN: CPT | Performed by: PHYSICIAN ASSISTANT

## 2018-07-18 PROCEDURE — 93971 EXTREMITY STUDY: CPT

## 2018-07-18 NOTE — PROGRESS NOTES
Vascular lab right lower extremity venous doppler complete, prelim negative dvt - Cris Gomez PA-C aware

## 2018-07-18 NOTE — PROGRESS NOTES
Subjective   Kenrick Webster is a 57 y.o. male.     History of Present Illness   Kenrick Webster 57 y.o. male who presents today for leg sore onset Sunday; notice more yesterday; it goes and goes and stings/throbs medial thigh and happens at rest and ROM; sore to touch; not taking anything; not worse today; DVT about 10 years ago and a few dopplers since then negative.  See below; here today d/t pain and no injury;  ? Could this be a clot?  I feel lumpy 1cm soft tissue area under skin in this spot.  Does not feel like vein cord; ? Contusion soft tissue;  No SOA; no prolonged sitting; no rash   he has a history of   Patient Active Problem List   Diagnosis   • DVT (deep venous thrombosis) (CMS/HCC)   • Benign essential hypertension   • Renal insufficiency   • Vitamin D deficiency   • Hyperlipidemia   • Gastroesophageal reflux disease without esophagitis   • Moderate persistent asthma without complication   • Other chest pain   • Mass   • Stress at home   .      I personally discussed this patient's care and medical decision making with Dr. Urbina.  We reviewed the patient history, exam findings, and plan.  He did approve this plan of care.        Old notes and last labs d/t CKD  Lab Results   Component Value Date    GLUCOSE 90 10/17/2017    CALCIUM 9.9 07/11/2018     07/11/2018    K 3.6 07/11/2018    CO2 27.1 07/11/2018     07/11/2018    BUN 8 07/11/2018    CREATININE 1.61 (H) 07/11/2018    EGFRIFAFRI 54 (L) 07/11/2018    EGFRIFNONA 45 (L) 07/11/2018    BCR 5.0 (L) 07/11/2018    ANIONGAP 12.0 10/17/2017     Est CC is 73.9    Had CTA chest 10-17-17  Study Result     CT ANGIOGRAM THORAX WITH CONTRAST, PULMONARY EMBOLISM PROTOCOL     HISTORY: Pulmonary embolism.     COMPARISON: 12/08/2016, noncontrast exam.     TECHNIQUE: Radiation dose reduction techniques were utilized, including  automated exposure control and exposure modulation based on body size.  Axial contrast-enhanced images of the chest were  obtained according to  the pulmonary embolism protocol. Coronal oblique 3-D MIP reformatted  images were supplemented and reviewed.  100 mls of non ionic contrast  was utilized intravenously.     FINDINGS CHEST CT: Lungs well inflated and clear without evidence of  active airspace disease, edema, pleural fluid, or arterial filling  defect to suggest pulmonary embolism. Normal heart size, aorta  nonaneurysmal. There is partial visualization of an at least 14 mm  diameter upper pole left renal lesion. Cyst is favored, it was present  on the previous noncontrast exam. Suggest follow-up.                 IMPRESSION:  1. No active disease or evidence of pulmonary embolism.  2. Small left renal lesion as discussed     This report was finalized on 10/17/2017 10:07 PM by Carlos Block MD.      Last venous doppler 7-16-16 and neg    DVT in past RLQ years ago.    The following portions of the patient's history were reviewed and updated as appropriate: allergies, current medications, past family history, past medical history, past social history, past surgical history and problem list.    Review of Systems   Constitutional: Negative for activity change, appetite change and unexpected weight change.   HENT: Negative for nosebleeds and trouble swallowing.    Eyes: Negative for pain and visual disturbance.   Respiratory: Negative for chest tightness, shortness of breath and wheezing.    Cardiovascular: Negative for chest pain and palpitations.   Gastrointestinal: Negative for abdominal pain and blood in stool.   Endocrine: Negative.    Genitourinary: Negative for difficulty urinating and hematuria.   Musculoskeletal: Positive for arthralgias. Negative for joint swelling.   Skin: Negative for color change and rash.   Allergic/Immunologic: Positive for environmental allergies.   Neurological: Negative for syncope and speech difficulty.   Hematological: Negative for adenopathy.   Psychiatric/Behavioral: Negative for agitation and  confusion.   All other systems reviewed and are negative.      Objective   Physical Exam   Constitutional: He is oriented to person, place, and time. He appears well-developed and well-nourished. No distress.   HENT:   Head: Normocephalic and atraumatic.   Eyes: Pupils are equal, round, and reactive to light. Conjunctivae and EOM are normal. Right eye exhibits no discharge. Left eye exhibits no discharge. No scleral icterus.   Neck: Normal range of motion. Neck supple. No tracheal deviation present. No thyromegaly present.   Cardiovascular: Normal rate, regular rhythm, normal heart sounds, intact distal pulses and normal pulses.  Exam reveals no gallop.    No murmur heard.  Pulmonary/Chest: Effort normal and breath sounds normal. No respiratory distress. He has no wheezes. He has no rales.   Musculoskeletal: Normal range of motion. He exhibits tenderness. He exhibits no edema.   Neg Jesica's;  Sore right mid medial thigh with ? Soft tissue palp contused area under skin 1cm;  No palp venous cords; not red or hot; no ROM pain; no local edema   Neurological: He is alert and oriented to person, place, and time. He exhibits normal muscle tone. Coordination normal.   Skin: Skin is warm. No rash noted. No erythema. No pallor.   Psychiatric: He has a normal mood and affect. His behavior is normal. Judgment and thought content normal.   Nursing note and vitals reviewed.      Assessment/Plan   Kenrick was seen today for leg pain.    Diagnoses and all orders for this visit:    Leg pain, right  -     Duplex Venous Lower Extremity - Right CAR    History of recurrent deep vein thrombosis (DVT)  -     Duplex Venous Lower Extremity - Right CAR

## 2018-08-29 ENCOUNTER — OFFICE VISIT (OUTPATIENT)
Dept: FAMILY MEDICINE CLINIC | Facility: CLINIC | Age: 58
End: 2018-08-29

## 2018-08-29 VITALS
WEIGHT: 223 LBS | TEMPERATURE: 98 F | DIASTOLIC BLOOD PRESSURE: 79 MMHG | RESPIRATION RATE: 16 BRPM | HEART RATE: 80 BPM | SYSTOLIC BLOOD PRESSURE: 110 MMHG | HEIGHT: 73 IN | BODY MASS INDEX: 29.55 KG/M2

## 2018-08-29 DIAGNOSIS — Z01.818 PREOPERATIVE CLEARANCE: Primary | ICD-10-CM

## 2018-08-29 PROCEDURE — 93000 ELECTROCARDIOGRAM COMPLETE: CPT | Performed by: FAMILY MEDICINE

## 2018-08-29 PROCEDURE — 99396 PREV VISIT EST AGE 40-64: CPT | Performed by: FAMILY MEDICINE

## 2018-08-29 NOTE — PROGRESS NOTES
Procedure     ECG 12 Lead  Date/Time: 8/29/2018 10:57 AM  Performed by: MOLLY JOHNSON  Authorized by: MOLLY JOHNSON   Interpreted by ED physician  Comparison: compared with previous ECG from 10/20/2017  Similar to previous ECG  Rhythm: sinus rhythm  Rate: normal  Conduction: conduction normal  ST Segments: ST segments normal  T Waves: T waves normal  QRS axis: normal  Other: no other findings  Clinical impression: normal ECG

## 2018-08-29 NOTE — PROGRESS NOTES
"Subjective   Kenrick Webster is a 57 y.o. male.     CC: Preop Clearance for Shoulder Surgery    History of Present Illness     Pt comes I today for the above. Feels well w/o any pressing issues.       The following portions of the patient's history were reviewed and updated as appropriate: allergies, current medications, past family history, past medical history, past social history, past surgical history and problem list.    Review of Systems   Constitutional: Negative for activity change, chills, fatigue and fever.   Respiratory: Negative for cough and shortness of breath.    Cardiovascular: Negative for chest pain and palpitations.   Gastrointestinal: Negative for abdominal pain.   Endocrine: Negative for cold intolerance.   Psychiatric/Behavioral: Negative for behavioral problems and dysphoric mood. The patient is not nervous/anxious.      /79   Pulse 80   Temp 98 °F (36.7 °C) (Oral)   Resp 16   Ht 185.4 cm (73\")   Wt 101 kg (223 lb)   BMI 29.42 kg/m²     Objective   Physical Exam   Constitutional: He appears well-developed and well-nourished.   Neck: Neck supple. No thyromegaly present.   Cardiovascular: Normal rate and regular rhythm.    No murmur heard.  Pulmonary/Chest: Effort normal and breath sounds normal.   Abdominal: Bowel sounds are normal. There is no tenderness.   Psychiatric: He has a normal mood and affect. His behavior is normal.   Nursing note and vitals reviewed.      Assessment/Plan   Kenrick was seen today for pre-op exam.    Diagnoses and all orders for this visit:    Preoperative clearance  -     CBC & Differential  -     Basic Metabolic Panel  -     ECG 12 Lead    Pt stable and cleared for his surgery.           "

## 2018-08-30 LAB
BASOPHILS # BLD AUTO: 0.04 10*3/MM3 (ref 0–0.2)
BASOPHILS NFR BLD AUTO: 0.5 % (ref 0–1.5)
BUN SERPL-MCNC: 11 MG/DL (ref 6–20)
BUN/CREAT SERPL: 7.1 (ref 7–25)
CALCIUM SERPL-MCNC: 9.7 MG/DL (ref 8.6–10.5)
CHLORIDE SERPL-SCNC: 103 MMOL/L (ref 98–107)
CO2 SERPL-SCNC: 26.9 MMOL/L (ref 22–29)
CREAT SERPL-MCNC: 1.54 MG/DL (ref 0.76–1.27)
EOSINOPHIL # BLD AUTO: 0.18 10*3/MM3 (ref 0–0.7)
EOSINOPHIL NFR BLD AUTO: 2.4 % (ref 0.3–6.2)
ERYTHROCYTE [DISTWIDTH] IN BLOOD BY AUTOMATED COUNT: 13.8 % (ref 11.5–14.5)
GLUCOSE SERPL-MCNC: 84 MG/DL (ref 65–99)
HCT VFR BLD AUTO: 43.9 % (ref 40.4–52.2)
HGB BLD-MCNC: 14.9 G/DL (ref 13.7–17.6)
IMM GRANULOCYTES # BLD: 0 10*3/MM3 (ref 0–0.03)
IMM GRANULOCYTES NFR BLD: 0 % (ref 0–0.5)
LYMPHOCYTES # BLD AUTO: 2.27 10*3/MM3 (ref 0.9–4.8)
LYMPHOCYTES NFR BLD AUTO: 30 % (ref 19.6–45.3)
MCH RBC QN AUTO: 30.8 PG (ref 27–32.7)
MCHC RBC AUTO-ENTMCNC: 33.9 G/DL (ref 32.6–36.4)
MCV RBC AUTO: 90.7 FL (ref 79.8–96.2)
MONOCYTES # BLD AUTO: 0.8 10*3/MM3 (ref 0.2–1.2)
MONOCYTES NFR BLD AUTO: 10.6 % (ref 5–12)
NEUTROPHILS # BLD AUTO: 4.27 10*3/MM3 (ref 1.9–8.1)
NEUTROPHILS NFR BLD AUTO: 56.5 % (ref 42.7–76)
PLATELET # BLD AUTO: 319 10*3/MM3 (ref 140–500)
POTASSIUM SERPL-SCNC: 4.5 MMOL/L (ref 3.5–5.2)
RBC # BLD AUTO: 4.84 10*6/MM3 (ref 4.6–6)
SODIUM SERPL-SCNC: 141 MMOL/L (ref 136–145)
WBC # BLD AUTO: 7.56 10*3/MM3 (ref 4.5–10.7)

## 2018-11-08 ENCOUNTER — TRANSCRIBE ORDERS (OUTPATIENT)
Dept: PHYSICAL THERAPY | Facility: CLINIC | Age: 58
End: 2018-11-08

## 2018-11-08 DIAGNOSIS — S40.211A ABRASION OF RIGHT SHOULDER, INITIAL ENCOUNTER: Primary | ICD-10-CM

## 2018-11-09 ENCOUNTER — TREATMENT (OUTPATIENT)
Dept: PHYSICAL THERAPY | Facility: CLINIC | Age: 58
End: 2018-11-09

## 2018-11-09 DIAGNOSIS — Z98.890 STATUS POST RIGHT ROTATOR CUFF REPAIR: ICD-10-CM

## 2018-11-09 DIAGNOSIS — M25.511 ACUTE PAIN OF RIGHT SHOULDER: Primary | ICD-10-CM

## 2018-11-09 DIAGNOSIS — M25.611 DECREASED RIGHT SHOULDER RANGE OF MOTION: ICD-10-CM

## 2018-11-09 PROCEDURE — 97162 PT EVAL MOD COMPLEX 30 MIN: CPT | Performed by: PHYSICAL THERAPIST

## 2018-11-09 PROCEDURE — 97110 THERAPEUTIC EXERCISES: CPT | Performed by: PHYSICAL THERAPIST

## 2018-11-09 PROCEDURE — 97140 MANUAL THERAPY 1/> REGIONS: CPT | Performed by: PHYSICAL THERAPIST

## 2018-11-09 PROCEDURE — 97032 APPL MODALITY 1+ESTIM EA 15: CPT | Performed by: PHYSICAL THERAPIST

## 2018-11-09 NOTE — PROGRESS NOTES
Physical Therapy Daily Progress Note  Visits:Visit count could not be calculated. Make sure you are using a visit which is associated with an episode.    Subjective : Kenrick Darin reports: ***    Objective: ***  See Exercise, Manual, and Modality Logs for complete treatment.     Assessment/Plan:***  {AMB PT PLAN (SOAP Note):85333}         Manual Therapy:    ***     mins  07946;  Therapeutic Exercise:    ***     mins  04600;     Neuromuscular Charleen:    ***    mins  31664;    Therapeutic Activity:     ***     mins  88640;     Gait Training:      ***     mins  18605;     Ultrasound:     ***     mins  38617;    Electrical Stimulation:    ***     mins  11226 ( );  Dry Needling     ***     mins self-pay    Timed Treatment:   ***   mins   Total Treatment:     ***   mins    Treatment this date performed by Bobbi Butler, PT student.      I was present in the PT department guiding the student by approving, concurring and confirming the skilled judgement for all services rendered.      Dominga Swift, PT  [place] License #[id number]    Physical Therapist

## 2018-11-09 NOTE — PROGRESS NOTES
Physical Therapy Initial Evaluation and Plan of Care    Patient: Kenrick Webster   : 1960  Diagnosis/ICD-10 Code:  Acute pain of right shoulder [M25.511]  Referring practitioner: Dea Arreola MD  Past Medical History Reviewed: 2018    PLOF: independent, working    Subjective Evaluation    History of Present Illness  Mechanism of injury: I had a R shoulder rotator cuff repair and labral debridement on 18. I have 6 incisions. I haven't had any therapy since the surgery. I haven't been moving it or using it to lift at all. I keep the elbow tucked in and use the lower arm only. I went to the doctor and they told me to hold off on therapy. The past week I have noticed it feels looser like it is loosing up on its own.      Patient Occupation:  Pain  Current pain ratin  At best pain ratin  At worst pain ratin  Location: R shoulder  Quality: dull ache (sting)  Relieving factors: ice  Aggravating factors: sleeping  Progression: improved    Hand dominance: right         Objective       Active Range of Motion   Left Shoulder   Normal active range of motion    Right Elbow   Flexion: WFL    Additional Active Range of Motion Details  Lacks 11 degrees of extension R elbow    Passive Range of Motion     Right Shoulder   Flexion: with pain  External rotation 0°: with pain    Additional Passive Range of Motion Details  R PROM scaption: 40, after stretchin (both painful)          Assessment & Plan     Assessment  Impairments: abnormal or restricted ROM, activity intolerance, impaired physical strength, lacks appropriate home exercise program and pain with function  Assessment details: Pt presents to PT with symptoms consistent with acute R shoulder pain following s/p R rotator cuff repair and labral debridement. Pt demonstrates decreased ROM and strength to R shoulder. Pts shoulder is very stiff and he was educated on trying to passively stretch at home. Pt would benefit from  skilled PT intervention to address the deficits noted.   Prognosis: fair  Functional Limitations: lifting, sleeping, pushing, uncomfortable because of pain, reaching behind back and reaching overhead  Goals  Plan Goals: SHORT TERM GOALS: 4-6 weeks  1. Patient to be compliant with HEP and no diff. with sleeping  2. Increased (R) UE strength to 4/5 with no pain> 4/10 to allow for household and work activities.   3. Pt to exhibit (R) shoulder active flexion / ABD to 135° in standing/sitting to assist with reaching overhead with less pain  4. Pt demonstrates improved posture in sitting and standing with minimal-no cues during treatment session    LONG TERM GOALS: 6-12 weeks  1. Pt score <40% perceived disability on DASH   2. Pt. to exhibit (R) shoulder AROM to WFL (> 160° flex/abd. to allow for reaching overhead and behind back without pain limiting function  3. Pt to exhibit 5/5 UE strength to allow for pushing/pulling and lifting >5 #to occur with pain <2/10  4. Pt able to reach overhead and lift 10# (B) x 10 to allow for return to doing work around home.       Plan  Therapy options: will be seen for skilled physical therapy services  Planned modality interventions: cryotherapy, electrical stimulation/Russian stimulation, iontophoresis, TENS, thermotherapy (hydrocollator packs) and ultrasound  Other planned modality interventions: Dry Needling  Planned therapy interventions: abdominal trunk stabilization, ADL retraining, flexibility, body mechanics training, home exercise program, functional ROM exercises, joint mobilization, manual therapy, neuromuscular re-education, postural training, soft tissue mobilization, spinal/joint mobilization, strengthening, stretching and therapeutic activities  Frequency: 3x week  Duration in visits: 20  Treatment plan discussed with: patient  Plan details: Continue to stretch PROM in flex/scaption/abd/ER per pt tolerance. Scapular exercises.         Manual Therapy:    10     mins   86490;  Therapeutic Exercise:    10     mins  74428;     Neuromuscular Charleen:    -    mins  25023;    Therapeutic Activity:     -     mins  21153;     Gait Training:      -     mins  97628;     Ultrasound:     -     mins  28883;    Electrical Stimulation:    15     mins  34286 ( );  Dry Needling     -     mins self-pay    Timed Treatment:   20   mins   Total Treatment:     80   mins    Treatment this date performed by Bobbi Butler, PT student.      I was present in the PT department guiding the student by approving, concurring and confirming the skilled judgement for all services rendered.        PT SIGNATURE: Dominga Swift PT   DATE TREATMENT INITIATED: 11/9/2018    Initial Certification  Certification Period: 2/7/2019  I certify that the therapy services are furnished while this patient is under my care.  The services outlined above are required by this patient, and will be reviewed every 90 days.     PHYSICIAN: Dea Arreola MD      DATE:     Please sign and return via fax to [phone].. Thank you, [facility].

## 2018-11-14 ENCOUNTER — TREATMENT (OUTPATIENT)
Dept: PHYSICAL THERAPY | Facility: CLINIC | Age: 58
End: 2018-11-14

## 2018-11-14 DIAGNOSIS — M25.611 DECREASED RIGHT SHOULDER RANGE OF MOTION: ICD-10-CM

## 2018-11-14 DIAGNOSIS — Z98.890 STATUS POST RIGHT ROTATOR CUFF REPAIR: ICD-10-CM

## 2018-11-14 DIAGNOSIS — M25.511 ACUTE PAIN OF RIGHT SHOULDER: Primary | ICD-10-CM

## 2018-11-14 PROCEDURE — 97110 THERAPEUTIC EXERCISES: CPT | Performed by: PHYSICAL THERAPIST

## 2018-11-14 PROCEDURE — 97140 MANUAL THERAPY 1/> REGIONS: CPT | Performed by: PHYSICAL THERAPIST

## 2018-11-14 PROCEDURE — 97032 APPL MODALITY 1+ESTIM EA 15: CPT | Performed by: PHYSICAL THERAPIST

## 2018-11-14 NOTE — PROGRESS NOTES
Physical Therapy Daily Progress Note  Visits:2    Subjective : Kenrick Webster reports: I tried my best with the exercises but I dread doing them. The table slides were hard.     Objective:     During table slides into R shoulder flex: approximately 60 degrees    See Exercise, Manual, and Modality Logs for complete treatment.     Assessment/Plan: Pt tolerated treatment fair. He is still very stiff but he was able to increase his PROM in flexion and ER during treatment. He experiences pain mostly when  He was educated on purchasing pulleys for at home in order to increase ROM and work on it over the holiday weekend when he will have no therapy.     Plan: continue to stretch to increased R shoulder ROM.           Manual Therapy:    15     mins  09218;  Therapeutic Exercise:    35     mins  18077;     Neuromuscular Charleen:    -    mins  78319;    Therapeutic Activity:     -     mins  67560;     Gait Training:      -     mins  29902;     Ultrasound:     -     mins  22810;    Electrical Stimulation:    15     mins  75491 ( );  Dry Needling     -     mins self-pay    Timed Treatment:   50   mins   Total Treatment:     70   mins    Treatment this date performed by Bobbi Butler, PT student.      I was present in the PT department guiding the student by approving, concurring and confirming the skilled judgement for all services rendered.      Dominga Swift, PT  [place] License #[id number]    Physical Therapist

## 2018-11-15 ENCOUNTER — TREATMENT (OUTPATIENT)
Dept: PHYSICAL THERAPY | Facility: CLINIC | Age: 58
End: 2018-11-15

## 2018-11-15 DIAGNOSIS — M25.611 DECREASED RIGHT SHOULDER RANGE OF MOTION: ICD-10-CM

## 2018-11-15 DIAGNOSIS — Z98.890 STATUS POST RIGHT ROTATOR CUFF REPAIR: ICD-10-CM

## 2018-11-15 DIAGNOSIS — M25.511 ACUTE PAIN OF RIGHT SHOULDER: Primary | ICD-10-CM

## 2018-11-15 PROCEDURE — 97110 THERAPEUTIC EXERCISES: CPT | Performed by: PHYSICAL THERAPIST

## 2018-11-15 PROCEDURE — 97140 MANUAL THERAPY 1/> REGIONS: CPT | Performed by: PHYSICAL THERAPIST

## 2018-11-15 PROCEDURE — 97032 APPL MODALITY 1+ESTIM EA 15: CPT | Performed by: PHYSICAL THERAPIST

## 2018-11-15 NOTE — PROGRESS NOTES
Physical Therapy Daily Progress Note  Visits:3    Subjective : Kenrick Webster reports: My arm felt better last night after the stretching, but it is sore today    Objective:   See Exercise, Manual, and Modality Logs for complete treatment.     Assessment/Plan:Pt has severely tight joint capsule and surrounding musculature. Add prone hang of right arm for home to help stretch right arm out from his body and to help with pain relief. Discuss use of a dynamic splint as well to help with getting right shoulder moving better. Also educated to try heat before stretching  Progress with stretching         Manual Therapy:    15     mins  27021;  Therapeutic Exercise:    24     mins  57677;     Neuromuscular Charleen:    -    mins  01602;    Therapeutic Activity:     -     mins  07957;     Gait Training:      -     mins  61254;     Ultrasound:     -     mins  99563;    Electrical Stimulation:    15     mins  96490 ( );  Dry Needling     -     mins self-pay    Timed Treatment:   39   mins   Total Treatment:     60   mins    Treatment this date performed by Bobbi Butler, PT student.      I was present in the PT department guiding the student by approving, concurring and confirming the skilled judgement for all services rendered.      Dominga Swift, PT  [place] License #[id number]    Physical Therapist

## 2018-11-20 ENCOUNTER — TREATMENT (OUTPATIENT)
Dept: PHYSICAL THERAPY | Facility: CLINIC | Age: 58
End: 2018-11-20

## 2018-11-20 DIAGNOSIS — M25.511 ACUTE PAIN OF RIGHT SHOULDER: Primary | ICD-10-CM

## 2018-11-20 DIAGNOSIS — M25.611 DECREASED RIGHT SHOULDER RANGE OF MOTION: ICD-10-CM

## 2018-11-20 DIAGNOSIS — Z98.890 STATUS POST RIGHT ROTATOR CUFF REPAIR: ICD-10-CM

## 2018-11-20 PROCEDURE — 97110 THERAPEUTIC EXERCISES: CPT | Performed by: PHYSICAL THERAPIST

## 2018-11-20 PROCEDURE — 97014 ELECTRIC STIMULATION THERAPY: CPT | Performed by: PHYSICAL THERAPIST

## 2018-11-20 PROCEDURE — 97140 MANUAL THERAPY 1/> REGIONS: CPT | Performed by: PHYSICAL THERAPIST

## 2018-11-20 NOTE — PROGRESS NOTES
Physical Therapy Daily Progress Note  Visit: 4    Kenrick Webster reports: I still have pain when I am moving it but I think it is moving better.     Subjective     Objective   See Exercise, Manual, and Modality Logs for complete treatment.       Assessment/Plan  After exercise and stretching pt was very sore. Pt could not tolerated self ER stretch. The rows specifically really irritated his shoulder and he requested to perform those last. Pts PROM seems to be improving slowly. Pt educated on importance of continuing to work on ROM at home.   Plan: continue with stretching/ROM, add shoulder isometrics next visit    Manual Therapy:    20     mins  54567;  Therapeutic Exercise:    30     mins  95035;     Neuromuscular Charleen:    -    mins  95887;    Therapeutic Activity:     -     mins  47505;     Gait Training:      -     mins  16511;     Ultrasound:     -     mins  32776;    Electrical Stimulation:    15     mins  97256 ( );  Dry Needling     -     mins self-pay    Timed Treatment:   50   mins   Total Treatment:     75   mins    Dominga Swift PT  [place] License #: [id number]    Physical Therapist

## 2018-11-26 ENCOUNTER — TREATMENT (OUTPATIENT)
Dept: PHYSICAL THERAPY | Facility: CLINIC | Age: 58
End: 2018-11-26

## 2018-11-26 DIAGNOSIS — Z98.890 STATUS POST RIGHT ROTATOR CUFF REPAIR: ICD-10-CM

## 2018-11-26 DIAGNOSIS — M25.511 ACUTE PAIN OF RIGHT SHOULDER: Primary | ICD-10-CM

## 2018-11-26 DIAGNOSIS — M25.611 DECREASED RIGHT SHOULDER RANGE OF MOTION: ICD-10-CM

## 2018-11-26 PROCEDURE — 97140 MANUAL THERAPY 1/> REGIONS: CPT | Performed by: PHYSICAL THERAPIST

## 2018-11-26 PROCEDURE — 97110 THERAPEUTIC EXERCISES: CPT | Performed by: PHYSICAL THERAPIST

## 2018-11-26 PROCEDURE — 97014 ELECTRIC STIMULATION THERAPY: CPT | Performed by: PHYSICAL THERAPIST

## 2018-11-26 NOTE — PROGRESS NOTES
Physical Therapy Daily Progress Note  Visit: 5    Kenrick Webster reports: My shoulder is sore.    Subjective     Objective   See Exercise, Manual, and Modality Logs for complete treatment.     R shoulder flexion PROM during table slide exercise: 100 degrees  Assessment/Plan   Pt tolerated treatment fair. Pt experiences pain with stretching and ROM activities. His PROM has improved since beginning of treatment but is still limited. He has the greatest difficulty with ER PROM/AAROM.  Plan: add inferior and posterior mobs if pt can tolerate, continue with stretching and add some strengthening exercises    Manual Therapy:    12     mins  54621;  Therapeutic Exercise:    23     mins  06193;     Neuromuscular Charleen:    -    mins  06385;    Therapeutic Activity:     -     mins  24697;     Gait Training:      -     mins  32290;     Ultrasound:     -     mins  34446;    Electrical Stimulation:    15     mins  77122 ( );  Dry Needling     -     mins self-pay    Timed Treatment:   35   mins   Total Treatment:     60   mins    Dominga Swift PT  [place] License #: [id number]    Physical Therapist

## 2018-11-28 ENCOUNTER — TREATMENT (OUTPATIENT)
Dept: PHYSICAL THERAPY | Facility: CLINIC | Age: 58
End: 2018-11-28

## 2018-11-28 DIAGNOSIS — M25.611 DECREASED RIGHT SHOULDER RANGE OF MOTION: ICD-10-CM

## 2018-11-28 DIAGNOSIS — Z98.890 STATUS POST RIGHT ROTATOR CUFF REPAIR: ICD-10-CM

## 2018-11-28 DIAGNOSIS — M25.511 ACUTE PAIN OF RIGHT SHOULDER: Primary | ICD-10-CM

## 2018-11-28 PROCEDURE — 97014 ELECTRIC STIMULATION THERAPY: CPT | Performed by: PHYSICAL THERAPIST

## 2018-11-28 PROCEDURE — 97140 MANUAL THERAPY 1/> REGIONS: CPT | Performed by: PHYSICAL THERAPIST

## 2018-11-28 PROCEDURE — 97110 THERAPEUTIC EXERCISES: CPT | Performed by: PHYSICAL THERAPIST

## 2018-11-28 NOTE — PROGRESS NOTES
Physical Therapy Daily Progress Note  Visit: 6    Kenrick Webster reports: I am doing okay. I am sensitive of the front and side of my arm. It hurts when you touch it.     Subjective     Objective   See Exercise, Manual, and Modality Logs for complete treatment.       Assessment/Plan   Pt tolerated treatment fair. Pt demonstrates sensitivity and has pain with very light STM over the R shoulder. Pt still has pain and his ROM is limited, especially in ER. Pt could not tolerate low grade R GH posterior mobilization secondary to pain in anterior shoulder.  Plan: continue to progress stretching/strengthening/functional activities per pt tolerance.    Manual Therapy:    10     mins  88785;  Therapeutic Exercise:    30     mins  42272;     Neuromuscular Charleen:    -    mins  27427;    Therapeutic Activity:     -     mins  97676;     Gait Training:      -     mins  39746;     Ultrasound:     -     mins  78481;    Electrical Stimulation:    15     mins  43274 ( );  Dry Needling     -     mins self-pay    Timed Treatment:   45   mins   Total Treatment:     69   mins    Dominga Swift PT  [place] License #: [id number]    Physical Therapist

## 2018-11-30 ENCOUNTER — TREATMENT (OUTPATIENT)
Dept: PHYSICAL THERAPY | Facility: CLINIC | Age: 58
End: 2018-11-30

## 2018-11-30 DIAGNOSIS — M25.511 ACUTE PAIN OF RIGHT SHOULDER: Primary | ICD-10-CM

## 2018-11-30 DIAGNOSIS — Z98.890 STATUS POST RIGHT ROTATOR CUFF REPAIR: ICD-10-CM

## 2018-11-30 DIAGNOSIS — M25.611 DECREASED RIGHT SHOULDER RANGE OF MOTION: ICD-10-CM

## 2018-11-30 PROCEDURE — 97110 THERAPEUTIC EXERCISES: CPT | Performed by: PHYSICAL THERAPIST

## 2018-11-30 PROCEDURE — 97014 ELECTRIC STIMULATION THERAPY: CPT | Performed by: PHYSICAL THERAPIST

## 2018-11-30 PROCEDURE — 97140 MANUAL THERAPY 1/> REGIONS: CPT | Performed by: PHYSICAL THERAPIST

## 2018-12-03 ENCOUNTER — TREATMENT (OUTPATIENT)
Dept: PHYSICAL THERAPY | Facility: CLINIC | Age: 58
End: 2018-12-03

## 2018-12-03 DIAGNOSIS — M25.511 ACUTE PAIN OF RIGHT SHOULDER: Primary | ICD-10-CM

## 2018-12-03 DIAGNOSIS — Z98.890 STATUS POST RIGHT ROTATOR CUFF REPAIR: ICD-10-CM

## 2018-12-03 DIAGNOSIS — M25.611 DECREASED RIGHT SHOULDER RANGE OF MOTION: ICD-10-CM

## 2018-12-03 PROCEDURE — 97110 THERAPEUTIC EXERCISES: CPT | Performed by: PHYSICAL THERAPIST

## 2018-12-03 PROCEDURE — 97014 ELECTRIC STIMULATION THERAPY: CPT | Performed by: PHYSICAL THERAPIST

## 2018-12-03 PROCEDURE — 97140 MANUAL THERAPY 1/> REGIONS: CPT | Performed by: PHYSICAL THERAPIST

## 2018-12-03 NOTE — PROGRESS NOTES
Physical Therapy Daily Progress Note  Visit: 8    Kenrick Webster reports: My shoulder always feels good until I start doing exercise. When I start to stretch or do exercise the pain increases and my shoulder feels like it is getting stiffer.     Subjective     Objective   See Exercise, Manual, and Modality Logs for complete treatment.     R shoulder PROM:  ER- 23 degrees  Flexion- 109 degrees     Pain with activity: 3-4/5    Assessment/Plan   Pt tolerated treatment well overall. Pts ROM has improved since initial visit but is still very limited. Pt demonstrates muscle guarding with stretching and has great sensitivity over the anterior aspect of his R shoulder.     Manual Therapy:    15     mins  77110;  Therapeutic Exercise:    40     mins  69080;     Neuromuscular Charleen:    -    mins  73659;    Therapeutic Activity:     -     mins  08164;     Gait Training:      -     mins  80842;     Ultrasound:     -     mins  67966;    Electrical Stimulation:    15     mins  57698 ( );  Dry Needling     -     mins self-pay    Timed Treatment:   55   mins   Total Treatment:     85   mins    Dominga Swift PT  [place] License #: [id number]    Physical Therapist

## 2018-12-10 ENCOUNTER — TRANSCRIBE ORDERS (OUTPATIENT)
Dept: PHYSICAL THERAPY | Facility: CLINIC | Age: 58
End: 2018-12-10

## 2018-12-10 DIAGNOSIS — Z98.890 S/P OPERATIVE PROCEDURE ON SHOULDER: Primary | ICD-10-CM

## 2018-12-17 ENCOUNTER — TREATMENT (OUTPATIENT)
Dept: PHYSICAL THERAPY | Facility: CLINIC | Age: 58
End: 2018-12-17

## 2018-12-17 DIAGNOSIS — M25.511 ACUTE PAIN OF RIGHT SHOULDER: Primary | ICD-10-CM

## 2018-12-17 DIAGNOSIS — Z98.890 STATUS POST RIGHT ROTATOR CUFF REPAIR: ICD-10-CM

## 2018-12-17 DIAGNOSIS — M25.611 DECREASED RIGHT SHOULDER RANGE OF MOTION: ICD-10-CM

## 2018-12-17 PROCEDURE — 97530 THERAPEUTIC ACTIVITIES: CPT | Performed by: PHYSICAL THERAPIST

## 2018-12-17 PROCEDURE — 97110 THERAPEUTIC EXERCISES: CPT | Performed by: PHYSICAL THERAPIST

## 2018-12-17 NOTE — PROGRESS NOTES
Physical Therapy Daily Progress Note  Visit: 9    Kenrick Webster reports: I cannot feel my arm, but I think the manipulation went well    Subjective     Objective       Passive Range of Motion     Right Shoulder   Flexion: 175 degrees   Abduction: 170 degrees   External rotation 45°: 80 degrees      See Exercise, Manual, and Modality Logs for complete treatment.       Assessment & Plan     Assessment  Assessment details: Pt continued to have block in right arm. Pt was able to achieve 170 degrees or greater of passive overhead motion in flexion and abduction. Had some tightness with end range, but was able to work this out after passive stretching. Gave pt pulleys to begin at home and educated to perform stretching exercises multiple times a day to keep shoulder moving fluidly.     Plan  Plan details: Will see pt everyday this week with focus on stretching GH joint and surrounding musculature        Manual Therapy:    10     mins  78429;  Therapeutic Exercise:    -     mins  85679;     Neuromuscular Charleen:    -    mins  46377;    Therapeutic Activity:     10     mins  42245;     Gait Training:      -     mins  59495;     Ultrasound:     -     mins  15199;    Electrical Stimulation:    -     mins  69638 ( );  Dry Needling     -     mins self-pay    Timed Treatment:   20   mins   Total Treatment:     25   mins    Dominga Swift PT  [place] License #: [id number]    Physical Therapist

## 2018-12-18 ENCOUNTER — TREATMENT (OUTPATIENT)
Dept: PHYSICAL THERAPY | Facility: CLINIC | Age: 58
End: 2018-12-18

## 2018-12-18 DIAGNOSIS — M25.511 ACUTE PAIN OF RIGHT SHOULDER: Primary | ICD-10-CM

## 2018-12-18 DIAGNOSIS — Z98.890 STATUS POST RIGHT ROTATOR CUFF REPAIR: ICD-10-CM

## 2018-12-18 DIAGNOSIS — M25.611 DECREASED RIGHT SHOULDER RANGE OF MOTION: ICD-10-CM

## 2018-12-18 PROCEDURE — 97014 ELECTRIC STIMULATION THERAPY: CPT | Performed by: PHYSICAL THERAPIST

## 2018-12-18 PROCEDURE — 97110 THERAPEUTIC EXERCISES: CPT | Performed by: PHYSICAL THERAPIST

## 2018-12-18 PROCEDURE — 97140 MANUAL THERAPY 1/> REGIONS: CPT | Performed by: PHYSICAL THERAPIST

## 2018-12-18 NOTE — PROGRESS NOTES
"Physical Therapy Daily Progress Note  Visit: 10    Kenrick Webster reports: My (R) shdr block wore off about 1:00 this morning.  It woke me up but after the pain pill.      Subjective     Objective   See Exercise, Manual, and Modality Logs for complete treatment.       Assessment & Plan     Assessment  Assessment details: The pt demos sustained ROM of the (R) shdr during PT with TE & MT.  The pt's (R) shdr did \"fatigue out\" with the exercises performed, limiting exercise progression.      Plan  Plan details: Progress ROM / strengthening /stabilization / functional activity as tolerated                   Manual Therapy:    15     mins  62235;  Therapeutic Exercise:    30     mins  02679;     Neuromuscular Charleen:        mins  15870;    Therapeutic Activity:          mins  27749;     Gait Training:           mins  15766;     Ultrasound:          mins  13918;    Electrical Stimulation:    15     mins  19162 ( );  Dry Needling          mins self-pay    Timed Treatment:   45   mins   Total Treatment:     60   mins    Isaac Prasad PT  [place] Lic. # [id number]  Physical Therapist      "

## 2018-12-19 ENCOUNTER — TREATMENT (OUTPATIENT)
Dept: PHYSICAL THERAPY | Facility: CLINIC | Age: 58
End: 2018-12-19

## 2018-12-19 DIAGNOSIS — Z98.890 STATUS POST RIGHT ROTATOR CUFF REPAIR: ICD-10-CM

## 2018-12-19 DIAGNOSIS — M25.511 ACUTE PAIN OF RIGHT SHOULDER: Primary | ICD-10-CM

## 2018-12-19 DIAGNOSIS — M25.611 DECREASED RIGHT SHOULDER RANGE OF MOTION: ICD-10-CM

## 2018-12-19 PROCEDURE — 97110 THERAPEUTIC EXERCISES: CPT | Performed by: PHYSICAL THERAPIST

## 2018-12-19 PROCEDURE — 97140 MANUAL THERAPY 1/> REGIONS: CPT | Performed by: PHYSICAL THERAPIST

## 2018-12-19 PROCEDURE — 97014 ELECTRIC STIMULATION THERAPY: CPT | Performed by: PHYSICAL THERAPIST

## 2018-12-21 ENCOUNTER — TREATMENT (OUTPATIENT)
Dept: PHYSICAL THERAPY | Facility: CLINIC | Age: 58
End: 2018-12-21

## 2018-12-21 DIAGNOSIS — M25.611 DECREASED RIGHT SHOULDER RANGE OF MOTION: ICD-10-CM

## 2018-12-21 DIAGNOSIS — Z98.890 STATUS POST RIGHT ROTATOR CUFF REPAIR: ICD-10-CM

## 2018-12-21 DIAGNOSIS — M25.511 ACUTE PAIN OF RIGHT SHOULDER: Primary | ICD-10-CM

## 2018-12-21 PROCEDURE — 97110 THERAPEUTIC EXERCISES: CPT | Performed by: PHYSICAL THERAPIST

## 2018-12-21 PROCEDURE — 97014 ELECTRIC STIMULATION THERAPY: CPT | Performed by: PHYSICAL THERAPIST

## 2018-12-21 PROCEDURE — 97140 MANUAL THERAPY 1/> REGIONS: CPT | Performed by: PHYSICAL THERAPIST

## 2018-12-21 NOTE — PROGRESS NOTES
Physical Therapy Daily Progress Note  Visits:11    Subjective : Kenrick Webster reports: The shoulder is loosening up some but I can't stop tightening my shoulder.  I am more aware of moving the arm when I walk as well.      Objective:   See Exercise, Manual, and Modality Logs for complete treatment.     Assessment/Plan:Pt continues to have joint restrictions but is able to obtain much more motion with PROM vs. AAROM.  Long slow stretching continues to be the most beneficial mode of manual intervention.      Progress per Plan of Care and Progress strengthening /stabilization /functional activity     Manual Therapy:    18     mins  96045;  Therapeutic Exercise:    30     mins  24357;     Neuromuscular Charleen:    -    mins  91601;    Therapeutic Activity:     -     mins  67594;     Gait Training:      -     mins  09455;     Ultrasound:     -     mins  39364;    Electrical Stimulation:    15     mins  10416 ( );  Dry Needling     -     mins self-pay    Timed Treatment:   48   mins   Total Treatment:     63   mins    AYAAN Durant License #[id number]    Physical Therapist

## 2018-12-22 NOTE — PROGRESS NOTES
Physical Therapy Daily Progress Note  Visit: 11    Kenrick Webster reports: My (R) shdr is sore but it is doing well.      Subjective     Objective   See Exercise, Manual, and Modality Logs for complete treatment.       Assessment & Plan     Assessment  Assessment details: The pt continues to demo improved ROM of the (R) shdr but poor endurance for the exercises with strengthening and endurance.      Plan  Plan details: Progress ROM / strengthening /stabilization / functional activity as tolerated                   Manual Therapy:    18     mins  71067;  Therapeutic Exercise:    30     mins  36401;     Neuromuscular Charleen:        mins  27118;    Therapeutic Activity:          mins  43423;     Gait Training:           mins  56958;     Ultrasound:          mins  21993;    Electrical Stimulation:         mins  69739 ( );  Dry Needling          mins self-pay    Timed Treatment:   48   mins   Total Treatment:     63   mins    Isaac Prasad PT  [place] Lic. # [id number]  Physical Therapist

## 2018-12-24 ENCOUNTER — TREATMENT (OUTPATIENT)
Dept: PHYSICAL THERAPY | Facility: CLINIC | Age: 58
End: 2018-12-24

## 2018-12-24 DIAGNOSIS — Z98.890 STATUS POST RIGHT ROTATOR CUFF REPAIR: ICD-10-CM

## 2018-12-24 DIAGNOSIS — M25.511 ACUTE PAIN OF RIGHT SHOULDER: Primary | ICD-10-CM

## 2018-12-24 DIAGNOSIS — M25.611 DECREASED RIGHT SHOULDER RANGE OF MOTION: ICD-10-CM

## 2018-12-24 PROCEDURE — 97110 THERAPEUTIC EXERCISES: CPT | Performed by: PHYSICAL THERAPIST

## 2018-12-24 PROCEDURE — 97140 MANUAL THERAPY 1/> REGIONS: CPT | Performed by: PHYSICAL THERAPIST

## 2018-12-24 PROCEDURE — 97014 ELECTRIC STIMULATION THERAPY: CPT | Performed by: PHYSICAL THERAPIST

## 2018-12-24 NOTE — PROGRESS NOTES
Physical Therapy Daily Progress Note  Visit: 13    Kenrick Webster reports: The is no doubt that the shoulder is getting better, but I still have pain.     Subjective     Objective   See Exercise, Manual, and Modality Logs for complete treatment.       Assessment & Plan     Assessment  Assessment details: Pt continues to have significant amounts of pain with exercises. Unable to perform active supine shoulder flexion without mod A due to weakness at this time. Continues to have most limitations with right shoulder flexion    Plan  Plan details: Progress as able        Manual Therapy:    12     mins  75393;  Therapeutic Exercise:    30     mins  39803;     Neuromuscular Charleen:    -    mins  41638;    Therapeutic Activity:     -     mins  22386;     Gait Training:      -     mins  30774;     Ultrasound:     -     mins  17928;    Electrical Stimulation:    15     mins  89118 ( );  Dry Needling     -     mins self-pay    Timed Treatment:   42   mins   Total Treatment:     70   mins    Dominga Swift PT  [place] License #: [id number]    Physical Therapist

## 2018-12-27 ENCOUNTER — OFFICE VISIT (OUTPATIENT)
Dept: FAMILY MEDICINE CLINIC | Facility: CLINIC | Age: 58
End: 2018-12-27

## 2018-12-27 VITALS
RESPIRATION RATE: 16 BRPM | WEIGHT: 217 LBS | HEIGHT: 73 IN | HEART RATE: 76 BPM | TEMPERATURE: 98.2 F | SYSTOLIC BLOOD PRESSURE: 112 MMHG | BODY MASS INDEX: 28.76 KG/M2 | DIASTOLIC BLOOD PRESSURE: 80 MMHG

## 2018-12-27 DIAGNOSIS — E78.2 MIXED HYPERLIPIDEMIA: ICD-10-CM

## 2018-12-27 DIAGNOSIS — I10 BENIGN ESSENTIAL HYPERTENSION: ICD-10-CM

## 2018-12-27 DIAGNOSIS — J30.1 CHRONIC SEASONAL ALLERGIC RHINITIS DUE TO POLLEN: ICD-10-CM

## 2018-12-27 DIAGNOSIS — K21.9 GASTROESOPHAGEAL REFLUX DISEASE WITHOUT ESOPHAGITIS: ICD-10-CM

## 2018-12-27 PROCEDURE — 99214 OFFICE O/P EST MOD 30 MIN: CPT | Performed by: FAMILY MEDICINE

## 2018-12-27 RX ORDER — ATORVASTATIN CALCIUM 10 MG/1
10 TABLET, FILM COATED ORAL DAILY
Qty: 90 TABLET | Refills: 1 | Status: SHIPPED | OUTPATIENT
Start: 2018-12-27 | End: 2019-07-08 | Stop reason: SDUPTHER

## 2018-12-27 RX ORDER — FLUTICASONE PROPIONATE 50 MCG
2 SPRAY, SUSPENSION (ML) NASAL DAILY
Qty: 3 BOTTLE | Refills: 1 | Status: SHIPPED | OUTPATIENT
Start: 2018-12-27 | End: 2022-01-20

## 2018-12-27 RX ORDER — OMEPRAZOLE 40 MG/1
40 CAPSULE, DELAYED RELEASE ORAL DAILY
Qty: 90 CAPSULE | Refills: 1 | Status: SHIPPED | OUTPATIENT
Start: 2018-12-27 | End: 2019-07-08 | Stop reason: SDUPTHER

## 2018-12-27 RX ORDER — LOSARTAN POTASSIUM 50 MG/1
50 TABLET ORAL DAILY
Qty: 90 TABLET | Refills: 1 | Status: SHIPPED | OUTPATIENT
Start: 2018-12-27 | End: 2019-07-08 | Stop reason: SDUPTHER

## 2018-12-27 RX ORDER — AMLODIPINE BESYLATE 10 MG/1
10 TABLET ORAL DAILY
Qty: 90 TABLET | Refills: 1 | Status: SHIPPED | OUTPATIENT
Start: 2018-12-27 | End: 2019-07-08 | Stop reason: SDUPTHER

## 2018-12-27 NOTE — PROGRESS NOTES
Subjective   Kenrick Webster is a 58 y.o. male.     History of Present Illness     Chief Complaint:   Chief Complaint   Patient presents with   • Hypertension     med refill      • Hyperlipidemia   • Heartburn   • Allergies       Kenrick Webster 58 y.o. male who presents today for Medical Management of the below listed issues and medication refills.  he has a problem list of   Patient Active Problem List   Diagnosis   • DVT (deep venous thrombosis) (CMS/[facility])   • Benign essential hypertension   • Renal insufficiency   • Vitamin D deficiency   • Hyperlipidemia   • Gastroesophageal reflux disease without esophagitis   • Moderate persistent asthma without complication   • Other chest pain   • Mass   • Stress at home   .  Since the last visit, he has overall felt well.  he has been compliant with   Current Outpatient Medications:   •  amLODIPine (NORVASC) 10 MG tablet, Take 1 tablet by mouth Daily., Disp: 90 tablet, Rfl: 1  •  atorvastatin (LIPITOR) 10 MG tablet, Take 1 tablet by mouth Daily., Disp: 90 tablet, Rfl: 1  •  azelastine (ASTELIN) 0.1 % nasal spray, 2 sprays into each nostril 2 (Two) Times a Day. Use in each nostril as directed, Disp: , Rfl:   •  cholecalciferol (VITAMIN D3) 1000 UNITS tablet, Take 2,000 Units by mouth Daily., Disp: , Rfl:   •  Diclofenac Sodium (PENNSAID) 2 % solution, Place 2 sprays on the skin 2 (Two) Times a Day., Disp: , Rfl:   •  fluticasone (FLONASE) 50 MCG/ACT nasal spray, 2 sprays into the nostril(s) as directed by provider Daily. Administer 2 sprays in each nostril for each dose., Disp: 3 bottle, Rfl: 1  •  losartan (COZAAR) 50 MG tablet, Take 1 tablet by mouth Daily., Disp: 90 tablet, Rfl: 1  •  mometasone-formoterol (DULERA 200) 200-5 MCG/ACT inhaler, Inhale 2 puffs 2 (Two) Times a Day., Disp: , Rfl:   •  omeprazole (priLOSEC) 40 MG capsule, Take 1 capsule by mouth Daily., Disp: 90 capsule, Rfl: 1  •  pantoprazole (PROTONIX) 40 MG EC tablet, Take 40 mg by mouth Daily., Disp: ,  "Rfl: .  he denies medication side effects.    All of the chronic condition(s) listed above are stable w/o issues.    /80   Pulse 76   Temp 98.2 °F (36.8 °C) (Oral)   Resp 16   Ht 185.4 cm (73\")   Wt 98.4 kg (217 lb)   BMI 28.63 kg/m²     Results for orders placed or performed in visit on 08/29/18   Basic Metabolic Panel   Result Value Ref Range    Glucose 84 65 - 99 mg/dL    BUN 11 6 - 20 mg/dL    Creatinine 1.54 (H) 0.76 - 1.27 mg/dL    eGFR Non African Am 47 (L) >60 mL/min/1.73    eGFR African Am 57 (L) >60 mL/min/1.73    BUN/Creatinine Ratio 7.1 7.0 - 25.0    Sodium 141 136 - 145 mmol/L    Potassium 4.5 3.5 - 5.2 mmol/L    Chloride 103 98 - 107 mmol/L    Total CO2 26.9 22.0 - 29.0 mmol/L    Calcium 9.7 8.6 - 10.5 mg/dL   CBC & Differential   Result Value Ref Range    WBC 7.56 4.50 - 10.70 10*3/mm3    RBC 4.84 4.60 - 6.00 10*6/mm3    Hemoglobin 14.9 13.7 - 17.6 g/dL    Hematocrit 43.9 40.4 - 52.2 %    MCV 90.7 79.8 - 96.2 fL    MCH 30.8 27.0 - 32.7 pg    MCHC 33.9 32.6 - 36.4 g/dL    RDW 13.8 11.5 - 14.5 %    Platelets 319 140 - 500 10*3/mm3    Neutrophil Rel % 56.5 42.7 - 76.0 %    Lymphocyte Rel % 30.0 19.6 - 45.3 %    Monocyte Rel % 10.6 5.0 - 12.0 %    Eosinophil Rel % 2.4 0.3 - 6.2 %    Basophil Rel % 0.5 0.0 - 1.5 %    Neutrophils Absolute 4.27 1.90 - 8.10 10*3/mm3    Lymphocytes Absolute 2.27 0.90 - 4.80 10*3/mm3    Monocytes Absolute 0.80 0.20 - 1.20 10*3/mm3    Eosinophils Absolute 0.18 0.00 - 0.70 10*3/mm3    Basophils Absolute 0.04 0.00 - 0.20 10*3/mm3    Immature Granulocyte Rel % 0.0 0.0 - 0.5 %    Immature Grans Absolute 0.00 0.00 - 0.03 10*3/mm3           The following portions of the patient's history were reviewed and updated as appropriate: allergies, current medications, past family history, past medical history, past social history, past surgical history and problem list.    Review of Systems   Constitutional: Negative for activity change, chills, fatigue and fever.   Respiratory: " Negative for cough and shortness of breath.    Cardiovascular: Negative for chest pain and palpitations.   Gastrointestinal: Negative for abdominal pain.   Endocrine: Negative for cold intolerance.   Psychiatric/Behavioral: Negative for behavioral problems and dysphoric mood. The patient is not nervous/anxious.        Objective   Physical Exam   Constitutional: He appears well-developed and well-nourished.   Neck: Neck supple. No thyromegaly present.   Cardiovascular: Normal rate and regular rhythm.   No murmur heard.  Pulmonary/Chest: Effort normal and breath sounds normal.   Abdominal: Bowel sounds are normal. There is no tenderness.   Psychiatric: He has a normal mood and affect. His behavior is normal.   Nursing note and vitals reviewed.      Assessment/Plan   Kenrick was seen today for hypertension, hyperlipidemia, heartburn and allergies.    Diagnoses and all orders for this visit:    Benign essential hypertension  -     amLODIPine (NORVASC) 10 MG tablet; Take 1 tablet by mouth Daily.  -     losartan (COZAAR) 50 MG tablet; Take 1 tablet by mouth Daily.    Mixed hyperlipidemia  -     atorvastatin (LIPITOR) 10 MG tablet; Take 1 tablet by mouth Daily.    Chronic seasonal allergic rhinitis due to pollen  -     fluticasone (FLONASE) 50 MCG/ACT nasal spray; 2 sprays into the nostril(s) as directed by provider Daily. Administer 2 sprays in each nostril for each dose.    Gastroesophageal reflux disease without esophagitis  -     omeprazole (priLOSEC) 40 MG capsule; Take 1 capsule by mouth Daily.

## 2018-12-28 ENCOUNTER — TREATMENT (OUTPATIENT)
Dept: PHYSICAL THERAPY | Facility: CLINIC | Age: 58
End: 2018-12-28

## 2018-12-28 DIAGNOSIS — M25.611 DECREASED RIGHT SHOULDER RANGE OF MOTION: ICD-10-CM

## 2018-12-28 DIAGNOSIS — Z98.890 STATUS POST RIGHT ROTATOR CUFF REPAIR: ICD-10-CM

## 2018-12-28 DIAGNOSIS — M25.511 ACUTE PAIN OF RIGHT SHOULDER: Primary | ICD-10-CM

## 2018-12-28 PROCEDURE — 97140 MANUAL THERAPY 1/> REGIONS: CPT | Performed by: PHYSICAL THERAPIST

## 2018-12-28 PROCEDURE — 97110 THERAPEUTIC EXERCISES: CPT | Performed by: PHYSICAL THERAPIST

## 2018-12-28 PROCEDURE — 97014 ELECTRIC STIMULATION THERAPY: CPT | Performed by: PHYSICAL THERAPIST

## 2018-12-28 NOTE — PROGRESS NOTES
Physical Therapy Daily Progress Note  Visit: 14    Kenrick Webster reports: I don't know how much better it is, but it is better    Subjective     Objective   See Exercise, Manual, and Modality Logs for complete treatment.       Assessment & Plan     Assessment  Assessment details: Pt had a good session today. Pt was able to perform supine active flexion without assistance today due to improved strength in right shoulder. Continue to focus on aggressive stretching and strength/stability around R shoulder    Plan  Plan details: Progress as stated above        Manual Therapy:    11     mins  09208;  Therapeutic Exercise:    35     mins  90168;     Neuromuscular Charleen:    -    mins  88028;    Therapeutic Activity:     -     mins  31679;     Gait Training:      -     mins  86950;     Ultrasound:     -     mins  03944;    Electrical Stimulation:    15     mins  40493 ( );  Dry Needling     -     mins self-pay    Timed Treatment:   46   mins   Total Treatment:     70   mins    Dominga Swift PT  [place] License #: [id number]    Physical Therapist

## 2018-12-31 ENCOUNTER — OFFICE VISIT (OUTPATIENT)
Dept: RETAIL CLINIC | Facility: CLINIC | Age: 58
End: 2018-12-31

## 2018-12-31 VITALS
TEMPERATURE: 98.5 F | DIASTOLIC BLOOD PRESSURE: 88 MMHG | OXYGEN SATURATION: 100 % | SYSTOLIC BLOOD PRESSURE: 118 MMHG | HEART RATE: 85 BPM

## 2018-12-31 DIAGNOSIS — B34.9 VIRAL INFECTION: Primary | ICD-10-CM

## 2018-12-31 PROCEDURE — 99213 OFFICE O/P EST LOW 20 MIN: CPT | Performed by: NURSE PRACTITIONER

## 2018-12-31 NOTE — PATIENT INSTRUCTIONS
Viral Illness, Adult  Viruses are tiny germs that can get into a person's body and cause illness. There are many different types of viruses, and they cause many types of illness. Viral illnesses can range from mild to severe. They can affect various parts of the body.  Common illnesses that are caused by a virus include colds and the flu. Viral illnesses also include serious conditions such as HIV/AIDS (human immunodeficiency virus/acquired immunodeficiency syndrome). A few viruses have been linked to certain cancers.  What are the causes?  Many types of viruses can cause illness. Viruses invade cells in your body, multiply, and cause the infected cells to malfunction or die. When the cell dies, it releases more of the virus. When this happens, you develop symptoms of the illness, and the virus continues to spread to other cells. If the virus takes over the function of the cell, it can cause the cell to divide and grow out of control, as is the case when a virus causes cancer.  Different viruses get into the body in different ways. You can get a virus by:  · Swallowing food or water that is contaminated with the virus.  · Breathing in droplets that have been coughed or sneezed into the air by an infected person.  · Touching a surface that has been contaminated with the virus and then touching your eyes, nose, or mouth.  · Being bitten by an insect or animal that carries the virus.  · Having sexual contact with a person who is infected with the virus.  · Being exposed to blood or fluids that contain the virus, either through an open cut or during a transfusion.    If a virus enters your body, your body's defense system (immune system) will try to fight the virus. You may be at higher risk for a viral illness if your immune system is weak.  What are the signs or symptoms?  Symptoms vary depending on the type of virus and the location of the cells that it invades. Common symptoms of the main types of viral illnesses  include:  Cold and flu viruses  · Fever.  · Headache.  · Sore throat.  · Muscle aches.  · Nasal congestion.  · Cough.  Digestive system (gastrointestinal) viruses  · Fever.  · Abdominal pain.  · Nausea.  · Diarrhea.  Liver viruses (hepatitis)  · Loss of appetite.  · Tiredness.  · Yellowing of the skin (jaundice).  Brain and spinal cord viruses  · Fever.  · Headache.  · Stiff neck.  · Nausea and vomiting.  · Confusion or sleepiness.  Skin viruses  · Warts.  · Itching.  · Rash.  Sexually transmitted viruses  · Discharge.  · Swelling.  · Redness.  · Rash.  How is this treated?  Viruses can be difficult to treat because they live within cells. Antibiotic medicines do not treat viruses because these drugs do not get inside cells. Treatment for a viral illness may include:  · Resting and drinking plenty of fluids.  · Medicines to relieve symptoms. These can include over-the-counter medicine for pain and fever, medicines for cough or congestion, and medicines to relieve diarrhea.  · Antiviral medicines. These drugs are available only for certain types of viruses. They may help reduce flu symptoms if taken early. There are also many antiviral medicines for hepatitis and HIV/AIDS.    Some viral illnesses can be prevented with vaccinations. A common example is the flu shot.  Follow these instructions at home:  Medicines    · Take over-the-counter and prescription medicines only as told by your health care provider.  · If you were prescribed an antiviral medicine, take it as told by your health care provider. Do not stop taking the medicine even if you start to feel better.  · Be aware of when antibiotics are needed and when they are not needed. Antibiotics do not treat viruses. If your health care provider thinks that you may have a bacterial infection as well as a viral infection, you may get an antibiotic.  ? Do not ask for an antibiotic prescription if you have been diagnosed with a viral illness. That will not make your  illness go away faster.  ? Frequently taking antibiotics when they are not needed can lead to antibiotic resistance. When this develops, the medicine no longer works against the bacteria that it normally fights.  General instructions  · Drink enough fluids to keep your urine clear or pale yellow.  · Rest as much as possible.  · Return to your normal activities as told by your health care provider. Ask your health care provider what activities are safe for you.  · Keep all follow-up visits as told by your health care provider. This is important.  How is this prevented?  Take these actions to reduce your risk of viral infection:  · Eat a healthy diet and get enough rest.  · Wash your hands often with soap and water. This is especially important when you are in public places. If soap and water are not available, use hand .  · Avoid close contact with friends and family who have a viral illness.  · If you travel to areas where viral gastrointestinal infection is common, avoid drinking water or eating raw food.  · Keep your immunizations up to date. Get a flu shot every year as told by your health care provider.  · Do not share toothbrushes, nail clippers, razors, or needles with other people.  · Always practice safe sex.    Contact a health care provider if:  · You have symptoms of a viral illness that do not go away.  · Your symptoms come back after going away.  · Your symptoms get worse.  Get help right away if:  · You have trouble breathing.  · You have a severe headache or a stiff neck.  · You have severe vomiting or abdominal pain.  This information is not intended to replace advice given to you by your health care provider. Make sure you discuss any questions you have with your health care provider.  Document Released: 04/28/2017 Document Revised: 05/31/2017 Document Reviewed: 04/28/2017  [employer] Interactive Patient Education © 2018 [employer]

## 2019-01-07 ENCOUNTER — TREATMENT (OUTPATIENT)
Dept: PHYSICAL THERAPY | Facility: CLINIC | Age: 59
End: 2019-01-07

## 2019-01-07 DIAGNOSIS — M25.511 ACUTE PAIN OF RIGHT SHOULDER: Primary | ICD-10-CM

## 2019-01-07 DIAGNOSIS — M25.611 DECREASED RIGHT SHOULDER RANGE OF MOTION: ICD-10-CM

## 2019-01-07 DIAGNOSIS — Z98.890 STATUS POST RIGHT ROTATOR CUFF REPAIR: ICD-10-CM

## 2019-01-07 PROCEDURE — 97014 ELECTRIC STIMULATION THERAPY: CPT | Performed by: PHYSICAL THERAPIST

## 2019-01-07 PROCEDURE — 97110 THERAPEUTIC EXERCISES: CPT | Performed by: PHYSICAL THERAPIST

## 2019-01-07 PROCEDURE — 97140 MANUAL THERAPY 1/> REGIONS: CPT | Performed by: PHYSICAL THERAPIST

## 2019-01-07 NOTE — PROGRESS NOTES
Physical Therapy Daily Progress Note  Visit: 15    Kenrick Webster reports: I did not do anything over the time period that I was sick, but my shoulder feels pretty good considering everything    Subjective     Objective   See Exercise, Manual, and Modality Logs for complete treatment.       Assessment & Plan     Assessment  Assessment details: Pt strength did look improved today. He was able to perform supine active flexion without assistance and able to progress with stabilization exercise in supine.     Plan  Plan details: Continue to focus on regaining ROM and strength of R shoulder/shoulder blade        Manual Therapy:    10     mins  78422;  Therapeutic Exercise:    30     mins  22731;     Neuromuscular Charleen:    -    mins  35269;    Therapeutic Activity:     -     mins  43386;     Gait Training:      -     mins  14191;     Ultrasound:     -     mins  41078;    Electrical Stimulation:    15     mins  45610 ( );  Dry Needling     -     mins self-pay    Timed Treatment:   40   mins   Total Treatment:     70   mins    Dominga Swift PT  [place] License #: [id number]    Physical Therapist

## 2019-01-10 ENCOUNTER — TREATMENT (OUTPATIENT)
Dept: PHYSICAL THERAPY | Facility: CLINIC | Age: 59
End: 2019-01-10

## 2019-01-10 DIAGNOSIS — M25.511 ACUTE PAIN OF RIGHT SHOULDER: Primary | ICD-10-CM

## 2019-01-10 DIAGNOSIS — M25.611 DECREASED RIGHT SHOULDER RANGE OF MOTION: ICD-10-CM

## 2019-01-10 DIAGNOSIS — Z98.890 STATUS POST RIGHT ROTATOR CUFF REPAIR: ICD-10-CM

## 2019-01-10 PROCEDURE — 97140 MANUAL THERAPY 1/> REGIONS: CPT | Performed by: PHYSICAL THERAPIST

## 2019-01-10 PROCEDURE — 97110 THERAPEUTIC EXERCISES: CPT | Performed by: PHYSICAL THERAPIST

## 2019-01-10 NOTE — PROGRESS NOTES
Physical Therapy Daily Progress Note  Visit: 16    Kenrick Webster reports: The shoulder did good after last session    Subjective     Objective   See Exercise, Manual, and Modality Logs for complete treatment.       Assessment & Plan     Assessment  Assessment details: Pt doing much better with mobility and pain management. He is progressing with strengthening exercises    Plan  Plan details: Progress per POC        Manual Therapy:    10     mins  14032;  Therapeutic Exercise:    35     mins  98120;     Neuromuscular Charleen:    -    mins  05081;    Therapeutic Activity:     -     mins  56437;     Gait Training:      -     mins  50578;     Ultrasound:     -     mins  59648;    Electrical Stimulation:    -     mins  59644 ( );  Dry Needling     -     mins self-pay    Timed Treatment:   45   mins   Total Treatment:     60   mins    Dominga Swift PT  [place] License #: [id number]    Physical Therapist

## 2019-01-14 ENCOUNTER — TREATMENT (OUTPATIENT)
Dept: PHYSICAL THERAPY | Facility: CLINIC | Age: 59
End: 2019-01-14

## 2019-01-14 DIAGNOSIS — M25.511 ACUTE PAIN OF RIGHT SHOULDER: Primary | ICD-10-CM

## 2019-01-14 DIAGNOSIS — Z98.890 STATUS POST RIGHT ROTATOR CUFF REPAIR: ICD-10-CM

## 2019-01-14 DIAGNOSIS — M25.611 DECREASED RIGHT SHOULDER RANGE OF MOTION: ICD-10-CM

## 2019-01-14 PROCEDURE — 97110 THERAPEUTIC EXERCISES: CPT | Performed by: PHYSICAL THERAPIST

## 2019-01-14 PROCEDURE — 97014 ELECTRIC STIMULATION THERAPY: CPT | Performed by: PHYSICAL THERAPIST

## 2019-01-14 PROCEDURE — 97140 MANUAL THERAPY 1/> REGIONS: CPT | Performed by: PHYSICAL THERAPIST

## 2019-01-14 NOTE — PROGRESS NOTES
Physical Therapy Daily Progress Note  Visit: 17    Kenrick Webster reports: Shoulder is doing ok. I think I am sleeping on it too much    Subjective     Objective   See Exercise, Manual, and Modality Logs for complete treatment.       Assessment & Plan     Assessment  Assessment details: Pt has demonstrated significant improvements in strength this week. Pt able to actively lift his arm 90 degrees against gravity vs. Unable to lift arm gravity eliminated 2 weeks ago. ROM is also improving    Plan  Plan details: Continue to progress with ROM and strength        Manual Therapy:    10     mins  16537;  Therapeutic Exercise:    29     mins  79766;     Neuromuscular Charleen:    -    mins  86779;    Therapeutic Activity:     -     mins  21982;     Gait Training:      -     mins  41512;     Ultrasound:     -     mins  07760;    Electrical Stimulation:    15     mins  58200 ( );  Dry Needling     -     mins self-pay    Timed Treatment:   39   mins   Total Treatment:     60   mins    Dominga Swift PT  [place] License #: [id number]    Physical Therapist

## 2019-01-17 ENCOUNTER — TREATMENT (OUTPATIENT)
Dept: PHYSICAL THERAPY | Facility: CLINIC | Age: 59
End: 2019-01-17

## 2019-01-17 DIAGNOSIS — M25.611 DECREASED RIGHT SHOULDER RANGE OF MOTION: ICD-10-CM

## 2019-01-17 DIAGNOSIS — Z98.890 STATUS POST RIGHT ROTATOR CUFF REPAIR: ICD-10-CM

## 2019-01-17 DIAGNOSIS — M25.511 ACUTE PAIN OF RIGHT SHOULDER: Primary | ICD-10-CM

## 2019-01-17 PROCEDURE — 97110 THERAPEUTIC EXERCISES: CPT | Performed by: PHYSICAL THERAPIST

## 2019-01-17 PROCEDURE — 97140 MANUAL THERAPY 1/> REGIONS: CPT | Performed by: PHYSICAL THERAPIST

## 2019-01-17 NOTE — PROGRESS NOTES
Physical Therapy Daily Progress Note  Visits:18    Subjective : Kenrick Webster reports: I have been working on my exercises diligently.  It still hurts but moving better.      Objective:   See Exercise, Manual, and Modality Logs for complete treatment.     Assessment/Plan:His PROM is demonstrating improvements but his AROM continues to be limited by residual pain and weakness.  We will continue to push for OH activity and rotational mobility.       Progress per Plan of Care and Progress strengthening /stabilization /functional activity     Manual Therapy:    15     mins  78199;  Therapeutic Exercise:    40     mins  66769;     Neuromuscular Charleen:    --    mins  45427;    Therapeutic Activity:     -     mins  12625;     Gait Training:      -     mins  60080;     Ultrasound:     -     mins  55657;    Electrical Stimulation:    -     mins  25050 ( );  Dry Needling     -     mins self-pay    Timed Treatment:   55   mins   Total Treatment:     65   mins    AYAAN Durant License #[id number]    Physical Therapist

## 2019-01-21 ENCOUNTER — TREATMENT (OUTPATIENT)
Dept: PHYSICAL THERAPY | Facility: CLINIC | Age: 59
End: 2019-01-21

## 2019-01-21 DIAGNOSIS — M25.511 ACUTE PAIN OF RIGHT SHOULDER: Primary | ICD-10-CM

## 2019-01-21 DIAGNOSIS — M25.611 DECREASED RIGHT SHOULDER RANGE OF MOTION: ICD-10-CM

## 2019-01-21 DIAGNOSIS — Z98.890 STATUS POST RIGHT ROTATOR CUFF REPAIR: ICD-10-CM

## 2019-01-21 PROCEDURE — 97140 MANUAL THERAPY 1/> REGIONS: CPT | Performed by: PHYSICAL THERAPIST

## 2019-01-21 PROCEDURE — 97110 THERAPEUTIC EXERCISES: CPT | Performed by: PHYSICAL THERAPIST

## 2019-01-21 NOTE — PROGRESS NOTES
Physical Therapy Daily Progress Note  Visit: 19    Kenrick Webster reports: I have another MD appt on 2/5    Subjective     Objective   See Exercise, Manual, and Modality Logs for complete treatment.       Assessment & Plan     Assessment  Assessment details: Pt continues to have end range limitations and weakness, but he is defintiely improving. Able to lift arm approximately 110 degrees against gravity vs unable to lift arm supine 3 weeks ago    Plan  Plan details: Progress with ROM and strength        Manual Therapy:    10     mins  17494;  Therapeutic Exercise:    30     mins  32961;     Neuromuscular Charleen:    -    mins  86078;    Therapeutic Activity:     -     mins  67144;     Gait Training:      -     mins  66757;     Ultrasound:     -     mins  00085;    Electrical Stimulation:    -     mins  21610 ( );  Dry Needling     -     mins self-pay    Timed Treatment:   40   mins   Total Treatment:     45   mins    Dominga Swift PT  [place] License #: [id number]    Physical Therapist

## 2019-01-24 ENCOUNTER — TREATMENT (OUTPATIENT)
Dept: PHYSICAL THERAPY | Facility: CLINIC | Age: 59
End: 2019-01-24

## 2019-01-24 DIAGNOSIS — M25.611 DECREASED RIGHT SHOULDER RANGE OF MOTION: ICD-10-CM

## 2019-01-24 DIAGNOSIS — M25.511 ACUTE PAIN OF RIGHT SHOULDER: Primary | ICD-10-CM

## 2019-01-24 DIAGNOSIS — Z98.890 STATUS POST RIGHT ROTATOR CUFF REPAIR: ICD-10-CM

## 2019-01-24 PROCEDURE — 97110 THERAPEUTIC EXERCISES: CPT | Performed by: PHYSICAL THERAPIST

## 2019-01-24 NOTE — PROGRESS NOTES
Physical Therapy Daily Progress Note  Visit: 20    Kenrick Webster reports: I am doing pretty good today. Shoulder is feeling pretty good    Subjective     Objective   See Exercise, Manual, and Modality Logs for complete treatment.       Assessment & Plan     Assessment  Assessment details: Pt did very well today. Was able to progress with standing strengthening exercises and demonstrated improved passive flexion in supine    Plan  Plan details: Progress as able        Manual Therapy:    5     mins  28421;  Therapeutic Exercise:    40     mins  58021;     Neuromuscular Charleen:    -    mins  95303;    Therapeutic Activity:     -     mins  03548;     Gait Training:      -     mins  71093;     Ultrasound:     -     mins  17826;    Electrical Stimulation:    -     mins  43458 ( );  Dry Needling     -     mins self-pay    Timed Treatment:   45   mins   Total Treatment:     60   mins    Dominga Swift PT  [place] License #: [id number]    Physical Therapist

## 2019-01-28 ENCOUNTER — TREATMENT (OUTPATIENT)
Dept: PHYSICAL THERAPY | Facility: CLINIC | Age: 59
End: 2019-01-28

## 2019-01-28 DIAGNOSIS — M25.511 ACUTE PAIN OF RIGHT SHOULDER: Primary | ICD-10-CM

## 2019-01-28 DIAGNOSIS — M25.611 DECREASED RIGHT SHOULDER RANGE OF MOTION: ICD-10-CM

## 2019-01-28 DIAGNOSIS — Z98.890 STATUS POST RIGHT ROTATOR CUFF REPAIR: ICD-10-CM

## 2019-01-28 PROCEDURE — 97140 MANUAL THERAPY 1/> REGIONS: CPT | Performed by: PHYSICAL THERAPIST

## 2019-01-28 PROCEDURE — 97110 THERAPEUTIC EXERCISES: CPT | Performed by: PHYSICAL THERAPIST

## 2019-01-28 NOTE — PROGRESS NOTES
Physical Therapy Daily Progress Note  Visit: 21    Kenrick Webster reports: I think everything is getting better    Subjective     Objective   See Exercise, Manual, and Modality Logs for complete treatment.       Assessment & Plan     Assessment  Assessment details: Pt doing much better. He continues to have end range limitations in mobility and weakness but each of these are improving. Education to continue aggressive stretching at home    Plan  Plan details: Progress per POC        Manual Therapy:    10     mins  50646;  Therapeutic Exercise:    35     mins  62952;     Neuromuscular Charleen:    -    mins  28463;    Therapeutic Activity:     -     mins  02984;     Gait Training:      -     mins  69848;     Ultrasound:     -     mins  86091;    Electrical Stimulation:    -     mins  42903 ( );  Dry Needling     -     mins self-pay    Timed Treatment:   45   mins   Total Treatment:     55   mins    Dominga Swift PT  [place] License #: [id number]    Physical Therapist

## 2019-01-31 ENCOUNTER — TREATMENT (OUTPATIENT)
Dept: PHYSICAL THERAPY | Facility: CLINIC | Age: 59
End: 2019-01-31

## 2019-01-31 DIAGNOSIS — M25.511 ACUTE PAIN OF RIGHT SHOULDER: Primary | ICD-10-CM

## 2019-01-31 DIAGNOSIS — M25.611 DECREASED RIGHT SHOULDER RANGE OF MOTION: ICD-10-CM

## 2019-01-31 DIAGNOSIS — Z98.890 STATUS POST RIGHT ROTATOR CUFF REPAIR: ICD-10-CM

## 2019-01-31 PROCEDURE — 97140 MANUAL THERAPY 1/> REGIONS: CPT | Performed by: PHYSICAL THERAPIST

## 2019-01-31 PROCEDURE — 97110 THERAPEUTIC EXERCISES: CPT | Performed by: PHYSICAL THERAPIST

## 2019-01-31 NOTE — PROGRESS NOTES
Physical Therapy Daily Progress Note  Visit: 22    Kenrick Webster reports: I am seeing improvements each day, but I can still tell that I am weak and I fatigue fast    Subjective     Objective   See Exercise, Manual, and Modality Logs for complete treatment.       Assessment & Plan     Assessment  Assessment details: Pt demonstrated excellent IR today and continued improvement in elevation. He continues to fatigue fast, but this is still improving    Plan  Plan details: Progress per POC        Manual Therapy:    10     mins  79965;  Therapeutic Exercise:    39     mins  95355;     Neuromuscular Charleen:    -    mins  47118;    Therapeutic Activity:     -     mins  14213;     Gait Training:      -     mins  20825;     Ultrasound:     -     mins  16790;    Electrical Stimulation:    -     mins  54734 ( );  Dry Needling     -     mins self-pay    Timed Treatment:   49   mins   Total Treatment:     60   mins    Dominga Swift PT  [place] License #: [id number]    Physical Therapist

## 2019-02-04 ENCOUNTER — HOSPITAL ENCOUNTER (EMERGENCY)
Facility: HOSPITAL | Age: 59
Discharge: HOME | End: 2019-02-04
Attending: EMERGENCY MEDICINE | Admitting: EMERGENCY MEDICINE

## 2019-02-04 ENCOUNTER — TREATMENT (OUTPATIENT)
Dept: PHYSICAL THERAPY | Facility: CLINIC | Age: 59
End: 2019-02-04

## 2019-02-04 VITALS
SYSTOLIC BLOOD PRESSURE: 109 MMHG | HEIGHT: 72 IN | DIASTOLIC BLOOD PRESSURE: 80 MMHG | RESPIRATION RATE: 16 BRPM | TEMPERATURE: 98 F | WEIGHT: 220.19 LBS | BODY MASS INDEX: 29.82 KG/M2 | OXYGEN SATURATION: 98 % | HEART RATE: 70 BPM

## 2019-02-04 DIAGNOSIS — M25.511 ACUTE PAIN OF RIGHT SHOULDER: Primary | ICD-10-CM

## 2019-02-04 DIAGNOSIS — M25.571 ACUTE RIGHT ANKLE PAIN: Primary | ICD-10-CM

## 2019-02-04 DIAGNOSIS — M25.611 DECREASED RIGHT SHOULDER RANGE OF MOTION: ICD-10-CM

## 2019-02-04 DIAGNOSIS — Z98.890 STATUS POST RIGHT ROTATOR CUFF REPAIR: ICD-10-CM

## 2019-02-04 LAB
ALBUMIN SERPL-MCNC: 4 G/DL (ref 3.5–5.2)
ALBUMIN/GLOB SERPL: 1.6 G/DL
ALP SERPL-CCNC: 69 U/L (ref 39–117)
ALT SERPL W P-5'-P-CCNC: 14 U/L (ref 1–41)
ANION GAP SERPL CALCULATED.3IONS-SCNC: 10.9 MMOL/L
AST SERPL-CCNC: 16 U/L (ref 1–40)
BASOPHILS # BLD AUTO: 0.04 10*3/MM3 (ref 0–0.2)
BASOPHILS NFR BLD AUTO: 0.5 % (ref 0–1.5)
BILIRUB SERPL-MCNC: 0.8 MG/DL (ref 0.1–1.2)
BUN BLD-MCNC: 10 MG/DL (ref 6–20)
BUN/CREAT SERPL: 7.4 (ref 7–25)
CALCIUM SPEC-SCNC: 9.1 MG/DL (ref 8.6–10.5)
CHLORIDE SERPL-SCNC: 104 MMOL/L (ref 98–107)
CO2 SERPL-SCNC: 26.1 MMOL/L (ref 22–29)
CREAT BLD-MCNC: 1.35 MG/DL (ref 0.76–1.27)
D DIMER PPP FEU-MCNC: 0.31 MCGFEU/ML (ref 0–0.49)
DEPRECATED RDW RBC AUTO: 45.9 FL (ref 37–54)
EOSINOPHIL # BLD AUTO: 0.26 10*3/MM3 (ref 0–0.7)
EOSINOPHIL NFR BLD AUTO: 3.4 % (ref 0.3–6.2)
ERYTHROCYTE [DISTWIDTH] IN BLOOD BY AUTOMATED COUNT: 13.8 % (ref 11.5–14.5)
GFR SERPL CREATININE-BSD FRML MDRD: 66 ML/MIN/1.73
GLOBULIN UR ELPH-MCNC: 2.5 GM/DL
GLUCOSE BLD-MCNC: 92 MG/DL (ref 65–99)
HCT VFR BLD AUTO: 39.7 % (ref 40.4–52.2)
HGB BLD-MCNC: 13 G/DL (ref 13.7–17.6)
IMM GRANULOCYTES # BLD AUTO: 0.02 10*3/MM3 (ref 0–0.03)
IMM GRANULOCYTES NFR BLD AUTO: 0.3 % (ref 0–0.5)
INR PPP: 1 (ref 0.9–1.1)
LYMPHOCYTES # BLD AUTO: 2.71 10*3/MM3 (ref 0.9–4.8)
LYMPHOCYTES NFR BLD AUTO: 35.7 % (ref 19.6–45.3)
MCH RBC QN AUTO: 29.7 PG (ref 27–32.7)
MCHC RBC AUTO-ENTMCNC: 32.7 G/DL (ref 32.6–36.4)
MCV RBC AUTO: 90.6 FL (ref 79.8–96.2)
MONOCYTES # BLD AUTO: 0.93 10*3/MM3 (ref 0.2–1.2)
MONOCYTES NFR BLD AUTO: 12.3 % (ref 5–12)
NEUTROPHILS # BLD AUTO: 3.63 10*3/MM3 (ref 1.9–8.1)
NEUTROPHILS NFR BLD AUTO: 47.8 % (ref 42.7–76)
PLATELET # BLD AUTO: 246 10*3/MM3 (ref 140–500)
PMV BLD AUTO: 8.7 FL (ref 6–12)
POTASSIUM BLD-SCNC: 4 MMOL/L (ref 3.5–5.2)
PROT SERPL-MCNC: 6.5 G/DL (ref 6–8.5)
PROTHROMBIN TIME: 13 SECONDS (ref 11.7–14.2)
RBC # BLD AUTO: 4.38 10*6/MM3 (ref 4.6–6)
SODIUM BLD-SCNC: 141 MMOL/L (ref 136–145)
WBC NRBC COR # BLD: 7.59 10*3/MM3 (ref 4.5–10.7)

## 2019-02-04 PROCEDURE — 85025 COMPLETE CBC W/AUTO DIFF WBC: CPT | Performed by: EMERGENCY MEDICINE

## 2019-02-04 PROCEDURE — 99283 EMERGENCY DEPT VISIT LOW MDM: CPT

## 2019-02-04 PROCEDURE — 97110 THERAPEUTIC EXERCISES: CPT | Performed by: PHYSICAL THERAPIST

## 2019-02-04 PROCEDURE — 85379 FIBRIN DEGRADATION QUANT: CPT | Performed by: EMERGENCY MEDICINE

## 2019-02-04 PROCEDURE — 85610 PROTHROMBIN TIME: CPT | Performed by: EMERGENCY MEDICINE

## 2019-02-04 PROCEDURE — 80053 COMPREHEN METABOLIC PANEL: CPT | Performed by: EMERGENCY MEDICINE

## 2019-02-04 PROCEDURE — 97140 MANUAL THERAPY 1/> REGIONS: CPT | Performed by: PHYSICAL THERAPIST

## 2019-02-04 RX ORDER — HYDROCODONE BITARTRATE AND ACETAMINOPHEN 10; 325 MG/1; MG/1
1 TABLET ORAL EVERY 12 HOURS
COMMUNITY
End: 2019-08-02

## 2019-02-04 RX ORDER — ONDANSETRON 4 MG/1
4 TABLET, FILM COATED ORAL EVERY 6 HOURS
COMMUNITY
End: 2019-08-02

## 2019-02-04 NOTE — ED PROVIDER NOTES
" EMERGENCY DEPARTMENT ENCOUNTER    CHIEF COMPLAINT  Chief Complaint: Leg swelling  History given by: Patient  History limited by: None  Room Number: 11/11  PMD: Galileo Urbina MD      HPI:  Pt is a 58 y.o. male who presents complaining of \"burning\" pain and swelling to his RLE that he noticed earlier tonight at approx. 0200. Pt denies chest pain and SOA, as well as, any pertinent injury to his RLE, but states he did ambulate today more than usual. Hx of DVT. He is not on anticoagulants.    Duration: Noticed earlier tonight at approx. 0200  Onset: Gradual  Timing: Constant  Location: RLE  Quality: \"Burning\" pain  Intensity/Severity: Moderate  Progression: Unchanged  Associated Symptoms: None stated  Previous Episodes: Hx of DVT.  Treatment before arrival: None    PAST MEDICAL HISTORY  Active Ambulatory Problems     Diagnosis Date Noted   • DVT (deep venous thrombosis) (CMS/HCC)    • Benign essential hypertension    • Renal insufficiency    • Vitamin D deficiency    • Hyperlipidemia    • Gastroesophageal reflux disease without esophagitis 11/25/2015   • Moderate persistent asthma without complication 12/19/2016   • Other chest pain 10/20/2017   • Mass 10/20/2017   • Stress at home 02/27/2018     Resolved Ambulatory Problems     Diagnosis Date Noted   • No Resolved Ambulatory Problems     Past Medical History:   Diagnosis Date   • Anxiety and depression    • Back pain    • Benign essential hypertension    • Bursitis    • Chest pain    • DVT (deep venous thrombosis) (CMS/HCC)    • Elevated cholesterol    • H/O complete eye exam scheduled   • Hyperlipidemia    • Renal insufficiency    • Vitamin D deficiency        PAST SURGICAL HISTORY  Past Surgical History:   Procedure Laterality Date   • APPENDECTOMY     • COLONOSCOPY     • ENDOSCOPY     • OTHER SURGICAL HISTORY      gallbladder testing or surgery   • ROTATOR CUFF REPAIR         FAMILY HISTORY  Family History   Problem Relation Age of Onset   • Hypertension Sister  " "  • Kidney disease Sister    • Diabetes Brother        SOCIAL HISTORY  Social History     Socioeconomic History   • Marital status: Single     Spouse name: Not on file   • Number of children: Not on file   • Years of education: Not on file   • Highest education level: Not on file   Social Needs   • Financial resource strain: Not on file   • Food insecurity - worry: Not on file   • Food insecurity - inability: Not on file   • Transportation needs - medical: Not on file   • Transportation needs - non-medical: Not on file   Occupational History   • Not on file   Tobacco Use   • Smoking status: Never Smoker   • Smokeless tobacco: Never Used   Substance and Sexual Activity   • Alcohol use: No   • Drug use: No   • Sexual activity: Not on file   Other Topics Concern   • Not on file   Social History Narrative   • Not on file       ALLERGIES  Patient has no known allergies.    REVIEW OF SYSTEMS  Review of Systems   Constitutional: Negative for activity change, appetite change and fever.   HENT: Negative for congestion and sore throat.    Eyes: Negative.    Respiratory: Negative for cough and shortness of breath.    Cardiovascular: Positive for leg swelling (RLE with \"burning\" pain). Negative for chest pain.   Gastrointestinal: Negative for abdominal pain, diarrhea and vomiting.   Endocrine: Negative.    Genitourinary: Negative for decreased urine volume and dysuria.   Musculoskeletal: Negative for neck pain.   Skin: Negative for rash and wound.   Allergic/Immunologic: Negative.    Neurological: Negative for weakness, numbness and headaches.   Hematological: Negative.    Psychiatric/Behavioral: Negative.    All other systems reviewed and are negative.      PHYSICAL EXAM  ED Triage Vitals   Temp Heart Rate Resp BP SpO2   02/04/19 0337 02/04/19 0337 02/04/19 0337 02/04/19 0359 02/04/19 0337   98.1 °F (36.7 °C) 82 16 122/82 97 %      Temp src Heart Rate Source Patient Position BP Location FiO2 (%)   02/04/19 0337 02/04/19 0359 " 02/04/19 0359 02/04/19 0359 --   Tympanic Monitor Lying Left arm        Physical Exam   Constitutional: He is oriented to person, place, and time. No distress.   HENT:   Head: Normocephalic and atraumatic.   Eyes: EOM are normal. Pupils are equal, round, and reactive to light.   Neck: Normal range of motion. Neck supple.   Cardiovascular: Normal rate, regular rhythm, normal heart sounds and intact distal pulses.   No murmur heard.  Pulses:       Dorsalis pedis pulses are 2+ on the right side, and 2+ on the left side.        Posterior tibial pulses are 2+ on the right side, and 2+ on the left side.   Pulmonary/Chest: Effort normal and breath sounds normal. No respiratory distress.   Abdominal: Soft. There is no tenderness. There is no rebound and no guarding.   Musculoskeletal: Normal range of motion.        Right ankle: He exhibits swelling (mild).   Good cap refill in RLE.   Neurological: He is alert and oriented to person, place, and time. He has normal sensation and normal strength.   Skin: Skin is warm and dry.   Psychiatric: Mood and affect normal.   Nursing note and vitals reviewed.      LAB RESULTS  Lab Results (last 24 hours)     Procedure Component Value Units Date/Time    CBC & Differential [[id number]] Collected:  02/04/19 0426    Specimen:  Blood Updated:  02/04/19 0437    Narrative:       The following orders were created for panel order CBC & Differential.  Procedure                               Abnormality         Status                     ---------                               -----------         ------                     CBC Auto Differential[[id number]]        Abnormal            Final result                 Please view results for these tests on the individual orders.    Comprehensive Metabolic Panel [[id number]]  (Abnormal) Collected:  02/04/19 0426    Specimen:  Blood Updated:  02/04/19 0456     Glucose 92 mg/dL      BUN 10 mg/dL      Creatinine 1.35 mg/dL      Sodium 141 mmol/L      Potassium  4.0 mmol/L      Chloride 104 mmol/L      CO2 26.1 mmol/L      Calcium 9.1 mg/dL      Total Protein 6.5 g/dL      Albumin 4.00 g/dL      ALT (SGPT) 14 U/L      AST (SGOT) 16 U/L      Alkaline Phosphatase 69 U/L      Total Bilirubin 0.8 mg/dL      eGFR   Amer 66 mL/min/1.73      Globulin 2.5 gm/dL      A/G Ratio 1.6 g/dL      BUN/Creatinine Ratio 7.4     Anion Gap 10.9 mmol/L     Protime-INR [[id number]]  (Normal) Collected:  02/04/19 0426    Specimen:  Blood Updated:  02/04/19 0449     Protime 13.0 Seconds      INR 1.00    D-dimer, Quantitative [[id number]]  (Normal) Collected:  02/04/19 0426    Specimen:  Blood Updated:  02/04/19 0449     D-Dimer, Quantitative 0.31 MCGFEU/mL     Narrative:       The Stago D-Dimer test used in conjunction with a clinical pretest probability (PTP) assessment model, has been approved by the FDA to rule out the presence of venous thromboembolism (VTE) in outpatients suspected of deep venous thrombosis (DVT) or pulmonary embolism (PE).     CBC Auto Differential [[id number]]  (Abnormal) Collected:  02/04/19 0426    Specimen:  Blood Updated:  02/04/19 0437     WBC 7.59 10*3/mm3      RBC 4.38 10*6/mm3      Hemoglobin 13.0 g/dL      Hematocrit 39.7 %      MCV 90.6 fL      MCH 29.7 pg      MCHC 32.7 g/dL      RDW 13.8 %      RDW-SD 45.9 fl      MPV 8.7 fL      Platelets 246 10*3/mm3      Neutrophil % 47.8 %      Lymphocyte % 35.7 %      Monocyte % 12.3 %      Eosinophil % 3.4 %      Basophil % 0.5 %      Immature Grans % 0.3 %      Neutrophils, Absolute 3.63 10*3/mm3      Lymphocytes, Absolute 2.71 10*3/mm3      Monocytes, Absolute 0.93 10*3/mm3      Eosinophils, Absolute 0.26 10*3/mm3      Basophils, Absolute 0.04 10*3/mm3      Immature Grans, Absolute 0.02 10*3/mm3           I ordered the above labs and reviewed the results.    PROGRESS AND CONSULTS     0412 Ordered CBC, D-dimer, INR, and CMP for further evaluation.    0506 Rechecked with pt and informed him of the results of his labs  and that there is no suspicion for a blood clot at this time. Plan to discharge with Voltaren gel. Pt understands and agrees with the plan, all questions answered.    MEDICAL DECISION MAKING  Results were reviewed/discussed with the patient and they were also made aware of online access. Pt also made aware that some labs, such as cultures, will not be resulted during ER visit and follow up with PMD is necessary.     MDM  Number of Diagnoses or Management Options     Amount and/or Complexity of Data Reviewed  Clinical lab tests: ordered and reviewed (D-dimer= 0.31, INR= 1.00, WBC= 7.59, Creatinine= 1.35)  Decide to obtain previous medical records or to obtain history from someone other than the patient: yes    Patient Progress  Patient progress: stable         DIAGNOSIS  Final diagnoses:   Acute right ankle pain       DISPOSITION  DISCHARGE    Patient discharged in stable condition.    Reviewed implications of results, diagnosis, meds, responsibility to follow up, warning signs and symptoms of possible worsening, potential complications and reasons to return to ER, including new or worsening sxs.    Patient/Family voiced understanding of above instructions.    Discussed plan for discharge, as there is no emergent indication for admission. Patient referred to primary care provider for BP management due to today's BP. Pt/family is agreeable and understands need for follow up and repeat testing.  Pt is aware that discharge does not mean that nothing is wrong but it indicates no emergency is present that requires admission and they must continue care with follow-up as given below or physician of their choice.     FOLLOW-UP  Galileo Urbina MD  [id number] [id number] [phone]               Medication List      New Prescriptions    diclofenac 1 % gel gel  Commonly known as:  VOLTAREN  Apply 4 g topically to the appropriate area as directed 4 (Four) Times a   Day As Needed (ankle  pain).  Replaces:  PENNSAID 2 % solution        Stop    mometasone-formoterol 200-5 MCG/ACT inhaler  Commonly known as:  DULERA 200     pantoprazole 40 MG EC tablet  Commonly known as:  PROTONIX     PENNSAID 2 % solution  Generic drug:  Diclofenac Sodium  Replaced by:  diclofenac 1 % gel gel          Latest Documented Vital Signs:  As of 5:06 AM  BP- 122/82 HR- 80 Temp- 98.1 °F (36.7 °C) (Tympanic) O2 sat- 97%    --  Documentation assistance provided by dallas Sanders for Dr. Owens.  Information recorded by the scribe was done at my direction and has been verified and validated by me.     Viky Sanders  02/04/19 5062       Isaac Owens MD  02/04/19 9234

## 2019-02-04 NOTE — PROGRESS NOTES
Physical Therapy Daily Progress Note  Visit: 23    Kenrick Webster reports: My shoulder is definitely getting better, but now my right knee is hurting. I went to ER this morning due to leg pain, but they said nothing was wrong    Subjective     Objective   See Exercise, Manual, and Modality Logs for complete treatment.       Assessment & Plan     Assessment  Assessment details: Pt continues to make significant gains in his strength and motion. I believe he would still benefit from PT services to continue addressing weakness and stabilization in his shoulder though in order to return to work    Plan  Plan details: Progress with strength        Manual Therapy:    11     mins  71611;  Therapeutic Exercise:    30     mins  89790;     Neuromuscular Charleen:    -    mins  92526;    Therapeutic Activity:     -     mins  06730;     Gait Training:      -     mins  07571;     Ultrasound:     -     mins  55989;    Electrical Stimulation:    -     mins  20815 ( );  Dry Needling     -     mins self-pay    Timed Treatment:   41   mins   Total Treatment:     55   mins    Dominga Swift PT  [place] License #: [id number]    Physical Therapist

## 2019-02-04 NOTE — ED NOTES
Pt c/o RLE pain and swelling, denies any injury. Pt states he noticed it tonight while sitting at the computer. Pt states he wears a knee brace on that leg.     Zenaida Cast RN  02/04/19 2368

## 2019-02-10 NOTE — PROGRESS NOTES
"Subjective   Kenrick Webster is a 58 y.o. male.     CC: ED F/U for Acute Ankle Pain    History of Present Illness     Pt returns today after going to the ED on 2/4/19. That visit was as follows:    HPI:  Pt is a 58 y.o. male who presents complaining of \"burning\" pain and swelling to his RLE that he noticed earlier tonight at approx. 0200. Pt denies chest pain and SOA, as well as, any pertinent injury to his RLE, but states he did ambulate today more than usual. Hx of DVT. He is not on anticoagulants.    0506 Rechecked with pt and informed him of the results of his labs and that there is no suspicion for a blood clot at this time. Plan to discharge with Voltaren gel. Pt understands and agrees with the plan, all questions answered.    DIAGNOSIS  Final diagnoses:   Acute right ankle pain     New Prescriptions    diclofenac 1 % gel gel  Commonly known as:  VOLTAREN  Apply 4 g topically to the appropriate area as directed 4 (Four) Times a   Day As Needed (ankle pain).  Replaces:  PENNSAID 2 % solution        Stop    mometasone-formoterol 200-5 MCG/ACT inhaler  Commonly known as:  DULERA 200     pantoprazole 40 MG EC tablet  Commonly known as:  PROTONIX     PENNSAID 2 % solution  Generic drug:  Diclofenac Sodium  Replaced by:  diclofenac 1 % gel gel    Current outpatient and discharge medications have been reconciled for the patient.  Reviewed by: Galileo Urbina MD    Pt reports had been wearing his right knee brace after vigerous walking prior to then sitting for 6 hours and working. That is when the swelling started. This has since resolved since home and the brace off.   FH: no gout    The following portions of the patient's history were reviewed and updated as appropriate: allergies, current medications, past family history, past medical history, past social history, past surgical history and problem list.    Review of Systems   Constitutional: Negative for activity change, chills, fatigue and fever.   Respiratory: " "Negative for cough and shortness of breath.    Cardiovascular: Negative for chest pain and palpitations.   Gastrointestinal: Negative for abdominal pain.   Endocrine: Negative for cold intolerance.   Psychiatric/Behavioral: Negative for behavioral problems and dysphoric mood. The patient is not nervous/anxious.      /83   Pulse 86   Temp 98.2 °F (36.8 °C) (Oral)   Resp 16   Ht 182.9 cm (72\")   Wt 99.8 kg (220 lb)   BMI 29.84 kg/m²     Objective   Physical Exam   Constitutional: He appears well-developed and well-nourished.   Neck: Neck supple. No thyromegaly present.   Cardiovascular: Normal rate and regular rhythm.   No murmur heard.  No edema today   Pulmonary/Chest: Effort normal and breath sounds normal.   Abdominal: Bowel sounds are normal. There is no tenderness.   Psychiatric: He has a normal mood and affect. His behavior is normal.   Nursing note and vitals reviewed.  Hospital records reviewed with pt confirming HPI.      Assessment/Plan   Kenrick was seen today for acute right ankle pain.    Diagnoses and all orders for this visit:    Acute right ankle pain    Hospital discharge follow-up    Patient's Body mass index is 29.84 kg/m². BMI is above normal parameters. Recommendations include: exercise counseling and nutrition counseling.    Pt encouraged to take his brace off when seated and to walk/take frequent brakes.         "

## 2019-02-11 ENCOUNTER — OFFICE VISIT (OUTPATIENT)
Dept: FAMILY MEDICINE CLINIC | Facility: CLINIC | Age: 59
End: 2019-02-11

## 2019-02-11 VITALS
WEIGHT: 220 LBS | DIASTOLIC BLOOD PRESSURE: 83 MMHG | TEMPERATURE: 98.2 F | SYSTOLIC BLOOD PRESSURE: 117 MMHG | BODY MASS INDEX: 29.8 KG/M2 | HEART RATE: 86 BPM | HEIGHT: 72 IN | RESPIRATION RATE: 16 BRPM

## 2019-02-11 DIAGNOSIS — M25.571 ACUTE RIGHT ANKLE PAIN: Primary | ICD-10-CM

## 2019-02-11 DIAGNOSIS — Z09 HOSPITAL DISCHARGE FOLLOW-UP: ICD-10-CM

## 2019-02-11 PROCEDURE — 99213 OFFICE O/P EST LOW 20 MIN: CPT | Performed by: FAMILY MEDICINE

## 2019-02-12 ENCOUNTER — TREATMENT (OUTPATIENT)
Dept: PHYSICAL THERAPY | Facility: CLINIC | Age: 59
End: 2019-02-12

## 2019-02-12 DIAGNOSIS — Z98.890 STATUS POST RIGHT ROTATOR CUFF REPAIR: ICD-10-CM

## 2019-02-12 DIAGNOSIS — M25.511 ACUTE PAIN OF RIGHT SHOULDER: Primary | ICD-10-CM

## 2019-02-12 DIAGNOSIS — M25.611 DECREASED RIGHT SHOULDER RANGE OF MOTION: ICD-10-CM

## 2019-02-12 PROCEDURE — 97110 THERAPEUTIC EXERCISES: CPT | Performed by: PHYSICAL THERAPIST

## 2019-02-12 NOTE — PROGRESS NOTES
Physical Therapy Daily Progress Note  Visit: 24    Kenrick Webster reports:My shoulder is doing well    Subjective     Objective   See Exercise, Manual, and Modality Logs for complete treatment.       Assessment & Plan     Assessment  Assessment details: Pt doing great. His shoulder ROM is improving and he is perform strength and stability exercises better each week    Plan  Plan details: Aggressive stretching next session        Manual Therapy:    -     mins  95469;  Therapeutic Exercise:    43     mins  97685;     Neuromuscular Charleen:    -    mins  25172;    Therapeutic Activity:     -     mins  90845;     Gait Training:      -     mins  58854;     Ultrasound:     -     mins  65517;    Electrical Stimulation:    -     mins  33547 ( );  Dry Needling     -     mins self-pay    Timed Treatment:   43   mins   Total Treatment:     60   mins    Dominga Swift PT  [place] License #: [id number]    Physical Therapist

## 2019-02-14 ENCOUNTER — TREATMENT (OUTPATIENT)
Dept: PHYSICAL THERAPY | Facility: CLINIC | Age: 59
End: 2019-02-14

## 2019-02-14 DIAGNOSIS — M25.511 ACUTE PAIN OF RIGHT SHOULDER: Primary | ICD-10-CM

## 2019-02-14 DIAGNOSIS — M25.611 DECREASED RIGHT SHOULDER RANGE OF MOTION: ICD-10-CM

## 2019-02-14 DIAGNOSIS — Z98.890 STATUS POST RIGHT ROTATOR CUFF REPAIR: ICD-10-CM

## 2019-02-14 PROCEDURE — 97140 MANUAL THERAPY 1/> REGIONS: CPT | Performed by: PHYSICAL THERAPIST

## 2019-02-14 PROCEDURE — 97110 THERAPEUTIC EXERCISES: CPT | Performed by: PHYSICAL THERAPIST

## 2019-02-14 NOTE — PROGRESS NOTES
Physical Therapy Daily Progress Note  Visit: 25    Kenrick Webster reports: Shoulder was sore yesterday and I don't know what I did    Subjective     Objective   See Exercise, Manual, and Modality Logs for complete treatment.       Assessment & Plan     Assessment  Assessment details: Pt is doing very well. Responded well to MET to improve flexion and ABD    Plan  Plan details: Progress with strengthening        Manual Therapy:    12     mins  63886;  Therapeutic Exercise:    30     mins  18479;     Neuromuscular Charleen:    -    mins  32294;    Therapeutic Activity:     -     mins  44103;     Gait Training:      -     mins  30594;     Ultrasound:     -     mins  66032;    Electrical Stimulation:    -     mins  09083 ( );  Dry Needling     -     mins self-pay    Timed Treatment:   42   mins   Total Treatment:     60   mins    Dominga Swift PT  [place] License #: [id number]    Physical Therapist

## 2019-02-19 ENCOUNTER — TREATMENT (OUTPATIENT)
Dept: PHYSICAL THERAPY | Facility: CLINIC | Age: 59
End: 2019-02-19

## 2019-02-19 DIAGNOSIS — M25.511 ACUTE PAIN OF RIGHT SHOULDER: Primary | ICD-10-CM

## 2019-02-19 DIAGNOSIS — Z98.890 STATUS POST RIGHT ROTATOR CUFF REPAIR: ICD-10-CM

## 2019-02-19 DIAGNOSIS — M25.611 DECREASED RIGHT SHOULDER RANGE OF MOTION: ICD-10-CM

## 2019-02-19 PROCEDURE — 97140 MANUAL THERAPY 1/> REGIONS: CPT | Performed by: PHYSICAL THERAPIST

## 2019-02-19 PROCEDURE — 97110 THERAPEUTIC EXERCISES: CPT | Performed by: PHYSICAL THERAPIST

## 2019-02-19 NOTE — PROGRESS NOTES
Physical Therapy Daily Progress Note  Visit: 26    Kenrick Webster reports: My shoulder is doing better everyday    Subjective     Objective   See Exercise, Manual, and Modality Logs for complete treatment.       Assessment & Plan     Assessment  Assessment details: Pt doing well. Noted some definite limitations with throwing ball at rebounder. Spent more time with ABD and ER stretching today    Plan  Plan details: Continue to progress with range and strength        Manual Therapy:    11     mins  01377;  Therapeutic Exercise:    32     mins  61685;     Neuromuscular Charleen:    -    mins  82034;    Therapeutic Activity:     -     mins  39585;     Gait Training:      -     mins  89101;     Ultrasound:     -     mins  49771;    Electrical Stimulation:    -     mins  18268 ( );  Dry Needling     -     mins self-pay    Timed Treatment:   43   mins   Total Treatment:     45   mins    Dominga Swift PT  [place] License #: [id number]    Physical Therapist

## 2019-02-21 ENCOUNTER — TREATMENT (OUTPATIENT)
Dept: PHYSICAL THERAPY | Facility: CLINIC | Age: 59
End: 2019-02-21

## 2019-02-21 DIAGNOSIS — Z98.890 STATUS POST RIGHT ROTATOR CUFF REPAIR: ICD-10-CM

## 2019-02-21 DIAGNOSIS — M25.611 DECREASED RIGHT SHOULDER RANGE OF MOTION: ICD-10-CM

## 2019-02-21 DIAGNOSIS — M25.511 ACUTE PAIN OF RIGHT SHOULDER: Primary | ICD-10-CM

## 2019-02-21 PROCEDURE — 97110 THERAPEUTIC EXERCISES: CPT | Performed by: PHYSICAL THERAPIST

## 2019-02-21 PROCEDURE — 97140 MANUAL THERAPY 1/> REGIONS: CPT | Performed by: PHYSICAL THERAPIST

## 2019-02-21 NOTE — PROGRESS NOTES
Physical Therapy Daily Progress Note  Visit: 27    Kenrick Webster reports: Shoulder is about the same today    Subjective     Objective   See Exercise, Manual, and Modality Logs for complete treatment.       Assessment & Plan     Assessment  Assessment details: Pt doing well. His strength has made tremendous progress. Still limited with end range elevation and ER    Plan  Plan details: Progress as able        Manual Therapy:    10     mins  82908;  Therapeutic Exercise:    34     mins  54673;     Neuromuscular Charleen:    -    mins  65667;    Therapeutic Activity:     -     mins  48725;     Gait Training:      -     mins  96446;     Ultrasound:     -     mins  60433;    Electrical Stimulation:    -     mins  88264 ( );  Dry Needling     -     mins self-pay    Timed Treatment:   44   mins   Total Treatment:     50   mins    Dominga Swift PT  [place] License #: [id number]    Physical Therapist

## 2019-02-25 ENCOUNTER — TREATMENT (OUTPATIENT)
Dept: PHYSICAL THERAPY | Facility: CLINIC | Age: 59
End: 2019-02-25

## 2019-02-25 DIAGNOSIS — Z98.890 STATUS POST RIGHT ROTATOR CUFF REPAIR: ICD-10-CM

## 2019-02-25 DIAGNOSIS — M25.611 DECREASED RIGHT SHOULDER RANGE OF MOTION: ICD-10-CM

## 2019-02-25 DIAGNOSIS — M25.511 ACUTE PAIN OF RIGHT SHOULDER: Primary | ICD-10-CM

## 2019-02-25 PROCEDURE — 97140 MANUAL THERAPY 1/> REGIONS: CPT | Performed by: PHYSICAL THERAPIST

## 2019-02-25 PROCEDURE — 97110 THERAPEUTIC EXERCISES: CPT | Performed by: PHYSICAL THERAPIST

## 2019-02-25 NOTE — PROGRESS NOTES
Physical Therapy Daily Progress Note  Visit: 28    Kenrick Webster reports: The shoulder is doing good.    Subjective     Objective   See Exercise, Manual, and Modality Logs for complete treatment.       Assessment & Plan     Assessment  Assessment details: Pt doing well. Going back to MD on Tuesday. Strength and endurance are improving    Plan  Plan details: Progress with strength        Manual Therapy:    8     mins  58727;  Therapeutic Exercise:    35     mins  24334;     Neuromuscular Charleen:    -    mins  37631;    Therapeutic Activity:     -     mins  59808;     Gait Training:      -     mins  03432;     Ultrasound:     -     mins  10790;    Electrical Stimulation:    -     mins  88672 ( );  Dry Needling     -     mins self-pay    Timed Treatment:   43   mins   Total Treatment:     65   mins    Dominga Swift PT  [place] License #: [id number]    Physical Therapist

## 2019-02-28 ENCOUNTER — TREATMENT (OUTPATIENT)
Dept: PHYSICAL THERAPY | Facility: CLINIC | Age: 59
End: 2019-02-28

## 2019-02-28 DIAGNOSIS — M25.511 ACUTE PAIN OF RIGHT SHOULDER: Primary | ICD-10-CM

## 2019-02-28 DIAGNOSIS — M25.611 DECREASED RIGHT SHOULDER RANGE OF MOTION: ICD-10-CM

## 2019-02-28 DIAGNOSIS — Z98.890 STATUS POST RIGHT ROTATOR CUFF REPAIR: ICD-10-CM

## 2019-02-28 PROCEDURE — 97140 MANUAL THERAPY 1/> REGIONS: CPT | Performed by: PHYSICAL THERAPIST

## 2019-02-28 PROCEDURE — 97110 THERAPEUTIC EXERCISES: CPT | Performed by: PHYSICAL THERAPIST

## 2019-02-28 NOTE — PROGRESS NOTES
Physical Therapy Daily Progress Note  Visit: 29    Kenrick Webster reports: I am doing good today    Subjective     Objective   See Exercise, Manual, and Modality Logs for complete treatment.       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

## 2019-03-04 ENCOUNTER — TREATMENT (OUTPATIENT)
Dept: PHYSICAL THERAPY | Facility: CLINIC | Age: 59
End: 2019-03-04

## 2019-03-04 DIAGNOSIS — Z98.890 STATUS POST RIGHT ROTATOR CUFF REPAIR: ICD-10-CM

## 2019-03-04 DIAGNOSIS — M25.611 DECREASED RIGHT SHOULDER RANGE OF MOTION: ICD-10-CM

## 2019-03-04 DIAGNOSIS — M25.511 ACUTE PAIN OF RIGHT SHOULDER: Primary | ICD-10-CM

## 2019-03-04 PROCEDURE — 97110 THERAPEUTIC EXERCISES: CPT | Performed by: PHYSICAL THERAPIST

## 2019-03-04 NOTE — PROGRESS NOTES
Physical Therapy Daily Progress Note  Visit: 30    Kenrick Webster reports: I am feeling good. Going to MD tomorrow. Soreness is still there, but it is improved a whole lot    Subjective     Objective   See Exercise, Manual, and Modality Logs for complete treatment.       Assessment & Plan     Assessment  Assessment details: Pt doing great. Has MD appt tomorrow. His pain levels, ROM and strength have all significantly improved.     Plan  Plan details: Progress with work related activities to prepare him for return to work        Manual Therapy:    -     mins  51952;  Therapeutic Exercise:    35     mins  01351;     Neuromuscular Charleen:    -    mins  70414;    Therapeutic Activity:     -     mins  73405;     Gait Training:      -     mins  48010;     Ultrasound:     -     mins  70897;    Electrical Stimulation:    -     mins  94255 ( );  Dry Needling     -     mins self-pay    Timed Treatment:   35   mins   Total Treatment:     50   mins    Dominga Swift PT  [place] License #: [id number]    Physical Therapist

## 2019-03-07 ENCOUNTER — TREATMENT (OUTPATIENT)
Dept: PHYSICAL THERAPY | Facility: CLINIC | Age: 59
End: 2019-03-07

## 2019-03-07 DIAGNOSIS — M25.611 DECREASED RIGHT SHOULDER RANGE OF MOTION: ICD-10-CM

## 2019-03-07 DIAGNOSIS — Z98.890 STATUS POST RIGHT ROTATOR CUFF REPAIR: ICD-10-CM

## 2019-03-07 DIAGNOSIS — M25.511 ACUTE PAIN OF RIGHT SHOULDER: Primary | ICD-10-CM

## 2019-03-07 PROCEDURE — 97110 THERAPEUTIC EXERCISES: CPT | Performed by: PHYSICAL THERAPIST

## 2019-03-07 NOTE — PROGRESS NOTES
Physical Therapy Daily Progress Note  Visit: 31    Kenrick Webster reports: MD was happy with my progress. I think I am returning to work next Friday. I think I need some more endurance, but I think I am ready to return to work    Subjective     Objective   See Exercise, Manual, and Modality Logs for complete treatment.       Assessment & Plan     Assessment  Assessment details: Pt is doing excellent. Doing great with work related activities and continues to build strength and endurance    Plan  Plan details: Continue with strength and endurance        Manual Therapy:    -     mins  14178;  Therapeutic Exercise:    42     mins  01355;     Neuromuscular Charleen:    -    mins  96035;    Therapeutic Activity:     -     mins  08217;     Gait Training:      -     mins  89116;     Ultrasound:     --     mins  58255;    Electrical Stimulation:    -     mins  29271 ( );  Dry Needling     -     mins self-pay    Timed Treatment:   42   mins   Total Treatment:     50   mins    Dominga Swift PT  [place] License #: [id number]    Physical Therapist

## 2019-03-11 ENCOUNTER — TREATMENT (OUTPATIENT)
Dept: PHYSICAL THERAPY | Facility: CLINIC | Age: 59
End: 2019-03-11

## 2019-03-11 DIAGNOSIS — M25.611 DECREASED RIGHT SHOULDER RANGE OF MOTION: ICD-10-CM

## 2019-03-11 DIAGNOSIS — M25.511 ACUTE PAIN OF RIGHT SHOULDER: Primary | ICD-10-CM

## 2019-03-11 DIAGNOSIS — Z98.890 STATUS POST RIGHT ROTATOR CUFF REPAIR: ICD-10-CM

## 2019-03-11 PROCEDURE — 97110 THERAPEUTIC EXERCISES: CPT | Performed by: PHYSICAL THERAPIST

## 2019-03-11 NOTE — PROGRESS NOTES
Physical Therapy Daily Progress Note  Visit: 32    Kenrick Webster reports: I was really sore after last session!    Subjective     Objective   See Exercise, Manual, and Modality Logs for complete treatment.       Assessment & Plan     Assessment  Assessment details: Pt did great and is returning to work this week. Will see him one more visit to discuss how the work day went and if any modifications or further therapy is needed    Plan  Plan details: Anticipate Dc next session        Manual Therapy:    -     mins  97777;  Therapeutic Exercise:    48     mins  96996;     Neuromuscular Charleen:    -    mins  15115;    Therapeutic Activity:     -     mins  63895;     Gait Training:      -     mins  23609;     Ultrasound:     -     mins  29283;    Electrical Stimulation:    -     mins  02838 ( );  Dry Needling     -     mins self-pay    Timed Treatment:   48   mins   Total Treatment:     55   mins    Dominga Swift PT  [place] License #: [id number]    Physical Therapist

## 2019-03-20 ENCOUNTER — TREATMENT (OUTPATIENT)
Dept: PHYSICAL THERAPY | Facility: CLINIC | Age: 59
End: 2019-03-20

## 2019-03-20 DIAGNOSIS — M25.511 ACUTE PAIN OF RIGHT SHOULDER: Primary | ICD-10-CM

## 2019-03-20 DIAGNOSIS — Z98.890 STATUS POST RIGHT ROTATOR CUFF REPAIR: ICD-10-CM

## 2019-03-20 DIAGNOSIS — M25.611 DECREASED RIGHT SHOULDER RANGE OF MOTION: ICD-10-CM

## 2019-03-20 PROCEDURE — 97110 THERAPEUTIC EXERCISES: CPT | Performed by: PHYSICAL THERAPIST

## 2019-03-20 NOTE — PROGRESS NOTES
Physical Therapy Daily Progress Note  Visit: 33    Kenrick Webster reports: I went back to work and everything has been going well. Pain on average is a 1/10, but can get up to a 3/10. Movement is there and I can do everything normally. Back to work full time    Subjective     Objective       Active Range of Motion     Right Shoulder   Flexion: 150 degrees   Abduction: 141 degrees   External rotation BTH: C5   Internal rotation BTB: L1     Strength/Myotome Testing     Right Shoulder   Normal muscle strength     See Exercise, Manual, and Modality Logs for complete treatment.       Assessment & Plan     Assessment  Assessment details: Pt has made excellent progress in therapy. In the past month his ROM and strength has significantly improved. He is back to work, has little reports of pain and can perform all functional activities at this time. Pt educated to continue strengthening and ROM exercises at home to continue to improve ROM and strength. Pt has no further questions or concerns regarding therapy at this time    Plan  Plan details: DC at this time        Manual Therapy:    -     mins  47129;  Therapeutic Exercise:    46     mins  52171;     Neuromuscular Charleen:    -    mins  62104;    Therapeutic Activity:     -     mins  95618;     Gait Training:      -     mins  61755;     Ultrasound:     -     mins  38838;    Electrical Stimulation:    --     mins  84109 ( );  Dry Needling     -     mins self-pay    Timed Treatment:   46   mins   Total Treatment:     50   mins    Dominga Swift PT  [place] License #: [id number]    Physical Therapist

## 2019-04-02 ENCOUNTER — OFFICE VISIT (OUTPATIENT)
Dept: FAMILY MEDICINE CLINIC | Facility: CLINIC | Age: 59
End: 2019-04-02

## 2019-04-02 VITALS
WEIGHT: 210 LBS | RESPIRATION RATE: 16 BRPM | DIASTOLIC BLOOD PRESSURE: 73 MMHG | HEIGHT: 72 IN | TEMPERATURE: 97.8 F | OXYGEN SATURATION: 98 % | BODY MASS INDEX: 28.44 KG/M2 | HEART RATE: 84 BPM | SYSTOLIC BLOOD PRESSURE: 114 MMHG

## 2019-04-02 DIAGNOSIS — I10 BENIGN ESSENTIAL HYPERTENSION: Primary | ICD-10-CM

## 2019-04-02 DIAGNOSIS — Z82.49 FAMILY HISTORY OF CHF (CONGESTIVE HEART FAILURE): ICD-10-CM

## 2019-04-02 PROCEDURE — 99213 OFFICE O/P EST LOW 20 MIN: CPT | Performed by: FAMILY MEDICINE

## 2019-04-02 NOTE — PROGRESS NOTES
Subjective   Kenrick Webster is a 58 y.o. male.     History of Present Illness     Chief Complaint:   Chief Complaint   Patient presents with   • REQUESTING CARDIVASCULAR TESTING     FAMILY HX FATHER HEART DISEASE    • Hypertension       Kenrick Webster 58 y.o. male who presents today for Medical Management of the below listed issues and medication refills.  he has a problem list of   Patient Active Problem List   Diagnosis   • DVT (deep venous thrombosis) (CMS/formerly [facility])   • Benign essential hypertension   • Renal insufficiency   • Vitamin D deficiency   • Hyperlipidemia   • Gastroesophageal reflux disease without esophagitis   • Moderate persistent asthma without complication   • Other chest pain   • Mass   • Stress at home   .  Since the last visit, he has overall felt well. His father passed of what sound like CHF and pt, although asymptomatic, is interested in further testing.  he has been compliant with   Current Outpatient Medications:   •  amLODIPine (NORVASC) 10 MG tablet, Take 1 tablet by mouth Daily., Disp: 90 tablet, Rfl: 1  •  atorvastatin (LIPITOR) 10 MG tablet, Take 1 tablet by mouth Daily., Disp: 90 tablet, Rfl: 1  •  azelastine (ASTELIN) 0.1 % nasal spray, 2 sprays into each nostril 2 (Two) Times a Day. Use in each nostril as directed, Disp: , Rfl:   •  cholecalciferol (VITAMIN D3) 1000 UNITS tablet, Take 2,000 Units by mouth Daily., Disp: , Rfl:   •  diclofenac (VOLTAREN) 1 % gel gel, Apply 4 g topically to the appropriate area as directed 4 (Four) Times a Day As Needed (ankle pain)., Disp: 100 g, Rfl: 0  •  fluticasone (FLONASE) 50 MCG/ACT nasal spray, 2 sprays into the nostril(s) as directed by provider Daily. Administer 2 sprays in each nostril for each dose., Disp: 3 bottle, Rfl: 1  •  HYDROcodone-acetaminophen (NORCO)  MG per tablet, Take 1 tablet by mouth Every 12 (Twelve) Hours., Disp: , Rfl:   •  losartan (COZAAR) 50 MG tablet, Take 1 tablet by mouth Daily., Disp: 90 tablet, Rfl: 1  •   "methylPREDNISolone (MEDROL, JEFFREY,) 4 MG tablet, Take as directed on package instructions., Disp: 21 tablet, Rfl: 0  •  omeprazole (priLOSEC) 40 MG capsule, Take 1 capsule by mouth Daily., Disp: 90 capsule, Rfl: 1  •  ondansetron (ZOFRAN) 4 MG tablet, Take 4 mg by mouth Every 6 (Six) Hours., Disp: , Rfl: .  he denies medication side effects.    All of the chronic condition(s) listed above are stable w/o issues.    /73   Pulse 84   Temp 97.8 °F (36.6 °C) (Oral)   Resp 16   Ht 182.9 cm (72\")   Wt 95.3 kg (210 lb)   SpO2 98%   BMI 28.48 kg/m²     Results for orders placed or performed during the hospital encounter of 02/04/19   Comprehensive Metabolic Panel   Result Value Ref Range    Glucose 92 65 - 99 mg/dL    BUN 10 6 - 20 mg/dL    Creatinine 1.35 (H) 0.76 - 1.27 mg/dL    Sodium 141 136 - 145 mmol/L    Potassium 4.0 3.5 - 5.2 mmol/L    Chloride 104 98 - 107 mmol/L    CO2 26.1 22.0 - 29.0 mmol/L    Calcium 9.1 8.6 - 10.5 mg/dL    Total Protein 6.5 6.0 - 8.5 g/dL    Albumin 4.00 3.50 - 5.20 g/dL    ALT (SGPT) 14 1 - 41 U/L    AST (SGOT) 16 1 - 40 U/L    Alkaline Phosphatase 69 39 - 117 U/L    Total Bilirubin 0.8 0.1 - 1.2 mg/dL    eGFR  African Amer 66 >60 mL/min/1.73    Globulin 2.5 gm/dL    A/G Ratio 1.6 g/dL    BUN/Creatinine Ratio 7.4 7.0 - 25.0    Anion Gap 10.9 mmol/L   Protime-INR   Result Value Ref Range    Protime 13.0 11.7 - 14.2 Seconds    INR 1.00 0.90 - 1.10   D-dimer, Quantitative   Result Value Ref Range    D-Dimer, Quantitative 0.31 0.00 - 0.49 MCGFEU/mL   CBC Auto Differential   Result Value Ref Range    WBC 7.59 4.50 - 10.70 10*3/mm3    RBC 4.38 (L) 4.60 - 6.00 10*6/mm3    Hemoglobin 13.0 (L) 13.7 - 17.6 g/dL    Hematocrit 39.7 (L) 40.4 - 52.2 %    MCV 90.6 79.8 - 96.2 fL    MCH 29.7 27.0 - 32.7 pg    MCHC 32.7 32.6 - 36.4 g/dL    RDW 13.8 11.5 - 14.5 %    RDW-SD 45.9 37.0 - 54.0 fl    MPV 8.7 6.0 - 12.0 fL    Platelets 246 140 - 500 10*3/mm3    Neutrophil % 47.8 42.7 - 76.0 %    " Lymphocyte % 35.7 19.6 - 45.3 %    Monocyte % 12.3 (H) 5.0 - 12.0 %    Eosinophil % 3.4 0.3 - 6.2 %    Basophil % 0.5 0.0 - 1.5 %    Immature Grans % 0.3 0.0 - 0.5 %    Neutrophils, Absolute 3.63 1.90 - 8.10 10*3/mm3    Lymphocytes, Absolute 2.71 0.90 - 4.80 10*3/mm3    Monocytes, Absolute 0.93 0.20 - 1.20 10*3/mm3    Eosinophils, Absolute 0.26 0.00 - 0.70 10*3/mm3    Basophils, Absolute 0.04 0.00 - 0.20 10*3/mm3    Immature Grans, Absolute 0.02 0.00 - 0.03 10*3/mm3           The following portions of the patient's history were reviewed and updated as appropriate: allergies, current medications, past family history, past medical history, past social history, past surgical history and problem list.    Review of Systems    Objective   Physical Exam    Assessment/Plan   Kenrick was seen today for requesting cardivascular testing and hypertension.    Diagnoses and all orders for this visit:    Benign essential hypertension

## 2019-05-08 ENCOUNTER — DOCUMENTATION (OUTPATIENT)
Dept: CARDIOLOGY | Facility: CLINIC | Age: 59
End: 2019-05-08

## 2019-07-08 ENCOUNTER — OFFICE VISIT (OUTPATIENT)
Dept: FAMILY MEDICINE CLINIC | Facility: CLINIC | Age: 59
End: 2019-07-08

## 2019-07-08 VITALS
TEMPERATURE: 98 F | HEIGHT: 72 IN | BODY MASS INDEX: 28.17 KG/M2 | RESPIRATION RATE: 16 BRPM | DIASTOLIC BLOOD PRESSURE: 79 MMHG | WEIGHT: 208 LBS | HEART RATE: 76 BPM | SYSTOLIC BLOOD PRESSURE: 119 MMHG

## 2019-07-08 DIAGNOSIS — K21.9 GASTROESOPHAGEAL REFLUX DISEASE WITHOUT ESOPHAGITIS: ICD-10-CM

## 2019-07-08 DIAGNOSIS — I10 BENIGN ESSENTIAL HYPERTENSION: ICD-10-CM

## 2019-07-08 DIAGNOSIS — E78.2 MIXED HYPERLIPIDEMIA: ICD-10-CM

## 2019-07-08 DIAGNOSIS — H53.9 TRANSIENT VISUAL DISTURBANCE: Primary | ICD-10-CM

## 2019-07-08 PROCEDURE — 99214 OFFICE O/P EST MOD 30 MIN: CPT | Performed by: FAMILY MEDICINE

## 2019-07-08 RX ORDER — FLUTICASONE PROPIONATE 50 MCG
2 SPRAY, SUSPENSION (ML) NASAL DAILY
Qty: 3 BOTTLE | Refills: 1 | Status: CANCELLED | OUTPATIENT
Start: 2019-07-08

## 2019-07-08 RX ORDER — AMLODIPINE BESYLATE 10 MG/1
10 TABLET ORAL DAILY
Qty: 90 TABLET | Refills: 1 | Status: SHIPPED | OUTPATIENT
Start: 2019-07-08 | End: 2019-12-19 | Stop reason: SDUPTHER

## 2019-07-08 RX ORDER — LOSARTAN POTASSIUM 50 MG/1
50 TABLET ORAL DAILY
Qty: 90 TABLET | Refills: 1 | Status: SHIPPED | OUTPATIENT
Start: 2019-07-08 | End: 2019-12-19 | Stop reason: SDUPTHER

## 2019-07-08 RX ORDER — OMEPRAZOLE 40 MG/1
40 CAPSULE, DELAYED RELEASE ORAL DAILY
Qty: 90 CAPSULE | Refills: 1 | OUTPATIENT
Start: 2019-07-08 | End: 2019-08-02

## 2019-07-08 RX ORDER — ATORVASTATIN CALCIUM 10 MG/1
10 TABLET, FILM COATED ORAL DAILY
Qty: 90 TABLET | Refills: 1 | Status: SHIPPED | OUTPATIENT
Start: 2019-07-08 | End: 2019-12-19 | Stop reason: SDUPTHER

## 2019-07-08 NOTE — PROGRESS NOTES
Subjective   Kenrick Webster is a 58 y.o. male.     History of Present Illness     Chief Complaint:   Chief Complaint   Patient presents with   • vision loss     RIGHT EYE - FOLLOW UP EYE EXAM 7/1/2019   • Hypertension   • Hyperlipidemia   • Heartburn       Kenrick Webster 58 y.o. male who presents today for Medical Management of the below listed issues and medication refills.  he has a problem list of   Patient Active Problem List   Diagnosis   • DVT (deep venous thrombosis) (CMS/HCC)   • Benign essential hypertension   • Renal insufficiency   • Vitamin D deficiency   • Hyperlipidemia   • Gastroesophageal reflux disease without esophagitis   • Moderate persistent asthma without complication   • Other chest pain   • Mass   • Stress at home   .  Since the last visit, he has overall felt well medically, although has been having some sudden onset right eye visual loss over the past several months. Saw optometry and was asked to see me for a w/u. Symptoms last 2-3 minutes when they happen.  he has been compliant with   Current Outpatient Medications:   •  amLODIPine (NORVASC) 10 MG tablet, Take 1 tablet by mouth Daily., Disp: 90 tablet, Rfl: 1  •  atorvastatin (LIPITOR) 10 MG tablet, Take 1 tablet by mouth Daily., Disp: 90 tablet, Rfl: 1  •  losartan (COZAAR) 50 MG tablet, Take 1 tablet by mouth Daily., Disp: 90 tablet, Rfl: 1  •  omeprazole (priLOSEC) 40 MG capsule, Take 1 capsule by mouth Daily., Disp: 90 capsule, Rfl: 1  •  azelastine (ASTELIN) 0.1 % nasal spray, 2 sprays into each nostril 2 (Two) Times a Day. Use in each nostril as directed, Disp: , Rfl:   •  cholecalciferol (VITAMIN D3) 1000 UNITS tablet, Take 2,000 Units by mouth Daily., Disp: , Rfl:   •  diclofenac (VOLTAREN) 1 % gel gel, Apply 4 g topically to the appropriate area as directed 4 (Four) Times a Day As Needed (ankle pain)., Disp: 100 g, Rfl: 0  •  fluticasone (FLONASE) 50 MCG/ACT nasal spray, 2 sprays into the nostril(s) as directed by provider  "Daily. Administer 2 sprays in each nostril for each dose., Disp: 3 bottle, Rfl: 1  •  HYDROcodone-acetaminophen (NORCO)  MG per tablet, Take 1 tablet by mouth Every 12 (Twelve) Hours., Disp: , Rfl:   •  ondansetron (ZOFRAN) 4 MG tablet, Take 4 mg by mouth Every 6 (Six) Hours., Disp: , Rfl: .  he denies medication side effects.    All of the chronic condition(s) listed above are stable w/o issues.    /79   Pulse 76   Temp 98 °F (36.7 °C) (Oral)   Resp 16   Ht 182.9 cm (72\")   Wt 94.3 kg (208 lb)   BMI 28.21 kg/m²     Results for orders placed or performed during the hospital encounter of 02/04/19   Comprehensive Metabolic Panel   Result Value Ref Range    Glucose 92 65 - 99 mg/dL    BUN 10 6 - 20 mg/dL    Creatinine 1.35 (H) 0.76 - 1.27 mg/dL    Sodium 141 136 - 145 mmol/L    Potassium 4.0 3.5 - 5.2 mmol/L    Chloride 104 98 - 107 mmol/L    CO2 26.1 22.0 - 29.0 mmol/L    Calcium 9.1 8.6 - 10.5 mg/dL    Total Protein 6.5 6.0 - 8.5 g/dL    Albumin 4.00 3.50 - 5.20 g/dL    ALT (SGPT) 14 1 - 41 U/L    AST (SGOT) 16 1 - 40 U/L    Alkaline Phosphatase 69 39 - 117 U/L    Total Bilirubin 0.8 0.1 - 1.2 mg/dL    eGFR  African Amer 66 >60 mL/min/1.73    Globulin 2.5 gm/dL    A/G Ratio 1.6 g/dL    BUN/Creatinine Ratio 7.4 7.0 - 25.0    Anion Gap 10.9 mmol/L   Protime-INR   Result Value Ref Range    Protime 13.0 11.7 - 14.2 Seconds    INR 1.00 0.90 - 1.10   D-dimer, Quantitative   Result Value Ref Range    D-Dimer, Quantitative 0.31 0.00 - 0.49 MCGFEU/mL   CBC Auto Differential   Result Value Ref Range    WBC 7.59 4.50 - 10.70 10*3/mm3    RBC 4.38 (L) 4.60 - 6.00 10*6/mm3    Hemoglobin 13.0 (L) 13.7 - 17.6 g/dL    Hematocrit 39.7 (L) 40.4 - 52.2 %    MCV 90.6 79.8 - 96.2 fL    MCH 29.7 27.0 - 32.7 pg    MCHC 32.7 32.6 - 36.4 g/dL    RDW 13.8 11.5 - 14.5 %    RDW-SD 45.9 37.0 - 54.0 fl    MPV 8.7 6.0 - 12.0 fL    Platelets 246 140 - 500 10*3/mm3    Neutrophil % 47.8 42.7 - 76.0 %    Lymphocyte % 35.7 19.6 - 45.3 " %    Monocyte % 12.3 (H) 5.0 - 12.0 %    Eosinophil % 3.4 0.3 - 6.2 %    Basophil % 0.5 0.0 - 1.5 %    Immature Grans % 0.3 0.0 - 0.5 %    Neutrophils, Absolute 3.63 1.90 - 8.10 10*3/mm3    Lymphocytes, Absolute 2.71 0.90 - 4.80 10*3/mm3    Monocytes, Absolute 0.93 0.20 - 1.20 10*3/mm3    Eosinophils, Absolute 0.26 0.00 - 0.70 10*3/mm3    Basophils, Absolute 0.04 0.00 - 0.20 10*3/mm3    Immature Grans, Absolute 0.02 0.00 - 0.03 10*3/mm3           The following portions of the patient's history were reviewed and updated as appropriate: allergies, current medications, past family history, past medical history, past social history, past surgical history and problem list.    Review of Systems   Constitutional: Negative for activity change, chills, fatigue and fever.   Eyes: Positive for visual disturbance (OD).   Respiratory: Negative for cough and shortness of breath.    Cardiovascular: Negative for chest pain and palpitations.   Gastrointestinal: Negative for abdominal pain.   Endocrine: Negative for cold intolerance.   Psychiatric/Behavioral: Negative for behavioral problems and dysphoric mood. The patient is not nervous/anxious.        Objective   Physical Exam   Constitutional: He appears well-developed and well-nourished.   Neck: Neck supple. No thyromegaly present.   Cardiovascular: Normal rate and regular rhythm.   No murmur heard.  Pulmonary/Chest: Effort normal and breath sounds normal.   Abdominal: Bowel sounds are normal. There is no tenderness.   Psychiatric: He has a normal mood and affect. His behavior is normal.   Nursing note and vitals reviewed.      Assessment/Plan   Kenrick was seen today for vision loss, hypertension, hyperlipidemia and heartburn.    Diagnoses and all orders for this visit:    Transient visual disturbance  -     US Carotid Bilateral; Future  -     Ambulatory Referral to Ophthalmology  -     MRI Brain Without Contrast; Future  -     Adult Transthoracic Echo Complete W/ Cont if  Necessary Per Protocol; Future    Benign essential hypertension  -     losartan (COZAAR) 50 MG tablet; Take 1 tablet by mouth Daily.  -     amLODIPine (NORVASC) 10 MG tablet; Take 1 tablet by mouth Daily.  -     Comprehensive metabolic panel  -     Lipid panel  -     CBC and Differential  -     TSH    Mixed hyperlipidemia  -     atorvastatin (LIPITOR) 10 MG tablet; Take 1 tablet by mouth Daily.    Gastroesophageal reflux disease without esophagitis  -     omeprazole (priLOSEC) 40 MG capsule; Take 1 capsule by mouth Daily.    Other orders  -     Cancel: fluticasone (FLONASE) 50 MCG/ACT nasal spray; 2 sprays into the nostril(s) as directed by provider Daily. Administer 2 sprays in each nostril for each dose.

## 2019-07-09 LAB
ALBUMIN SERPL-MCNC: 4.5 G/DL (ref 3.5–5.2)
ALBUMIN/GLOB SERPL: 1.7 G/DL
ALP SERPL-CCNC: 73 U/L (ref 39–117)
ALT SERPL-CCNC: 11 U/L (ref 1–41)
AST SERPL-CCNC: 19 U/L (ref 1–40)
BASOPHILS # BLD AUTO: 0.07 10*3/MM3 (ref 0–0.2)
BASOPHILS NFR BLD AUTO: 1.2 % (ref 0–1.5)
BILIRUB SERPL-MCNC: 1.1 MG/DL (ref 0.2–1.2)
BUN SERPL-MCNC: 7 MG/DL (ref 6–20)
BUN/CREAT SERPL: 4.9 (ref 7–25)
CALCIUM SERPL-MCNC: 9.7 MG/DL (ref 8.6–10.5)
CHLORIDE SERPL-SCNC: 100 MMOL/L (ref 98–107)
CHOLEST SERPL-MCNC: 174 MG/DL (ref 0–200)
CO2 SERPL-SCNC: 29.7 MMOL/L (ref 22–29)
CREAT SERPL-MCNC: 1.42 MG/DL (ref 0.76–1.27)
EOSINOPHIL # BLD AUTO: 0.25 10*3/MM3 (ref 0–0.4)
EOSINOPHIL NFR BLD AUTO: 4.1 % (ref 0.3–6.2)
ERYTHROCYTE [DISTWIDTH] IN BLOOD BY AUTOMATED COUNT: 13.2 % (ref 12.3–15.4)
GLOBULIN SER CALC-MCNC: 2.6 GM/DL
GLUCOSE SERPL-MCNC: 96 MG/DL (ref 65–99)
HCT VFR BLD AUTO: 48 % (ref 37.5–51)
HDLC SERPL-MCNC: 82 MG/DL (ref 40–60)
HGB BLD-MCNC: 15.2 G/DL (ref 13–17.7)
IMM GRANULOCYTES # BLD AUTO: 0.01 10*3/MM3 (ref 0–0.05)
IMM GRANULOCYTES NFR BLD AUTO: 0.2 % (ref 0–0.5)
LDLC SERPL CALC-MCNC: 81 MG/DL (ref 0–100)
LYMPHOCYTES # BLD AUTO: 1.77 10*3/MM3 (ref 0.7–3.1)
LYMPHOCYTES NFR BLD AUTO: 29.1 % (ref 19.6–45.3)
MCH RBC QN AUTO: 29 PG (ref 26.6–33)
MCHC RBC AUTO-ENTMCNC: 31.7 G/DL (ref 31.5–35.7)
MCV RBC AUTO: 91.4 FL (ref 79–97)
MONOCYTES # BLD AUTO: 0.62 10*3/MM3 (ref 0.1–0.9)
MONOCYTES NFR BLD AUTO: 10.2 % (ref 5–12)
NEUTROPHILS # BLD AUTO: 3.36 10*3/MM3 (ref 1.7–7)
NEUTROPHILS NFR BLD AUTO: 55.2 % (ref 42.7–76)
NRBC BLD AUTO-RTO: 0 /100 WBC (ref 0–0.2)
PLATELET # BLD AUTO: 320 10*3/MM3 (ref 140–450)
POTASSIUM SERPL-SCNC: 4.1 MMOL/L (ref 3.5–5.2)
PROT SERPL-MCNC: 7.1 G/DL (ref 6–8.5)
RBC # BLD AUTO: 5.25 10*6/MM3 (ref 4.14–5.8)
SODIUM SERPL-SCNC: 141 MMOL/L (ref 136–145)
TRIGL SERPL-MCNC: 53 MG/DL (ref 0–150)
TSH SERPL DL<=0.005 MIU/L-ACNC: 1.43 MIU/ML (ref 0.27–4.2)
VLDLC SERPL CALC-MCNC: 10.6 MG/DL
WBC # BLD AUTO: 6.08 10*3/MM3 (ref 3.4–10.8)

## 2019-07-15 ENCOUNTER — TREATMENT (OUTPATIENT)
Dept: PHYSICAL THERAPY | Facility: CLINIC | Age: 59
End: 2019-07-15

## 2019-07-15 DIAGNOSIS — G89.29 CHRONIC RIGHT SI JOINT PAIN: ICD-10-CM

## 2019-07-15 DIAGNOSIS — M54.50 RIGHT-SIDED LOW BACK PAIN WITHOUT SCIATICA, UNSPECIFIED CHRONICITY: Primary | ICD-10-CM

## 2019-07-15 DIAGNOSIS — M53.3 CHRONIC RIGHT SI JOINT PAIN: ICD-10-CM

## 2019-07-15 PROCEDURE — 97110 THERAPEUTIC EXERCISES: CPT | Performed by: PHYSICAL THERAPIST

## 2019-07-15 PROCEDURE — 97161 PT EVAL LOW COMPLEX 20 MIN: CPT | Performed by: PHYSICAL THERAPIST

## 2019-07-15 NOTE — PROGRESS NOTES
Physical Therapy Initial Evaluation and Plan of Care    Patient: Kenrick Webster   : 1960  Diagnosis/ICD-10 Code:  Right-sided low back pain without sciatica, unspecified chronicity [M54.5]  Referring practitioner: Dea Arreola MD  Past Medical History Reviewed: 7/15/2019    PLOF: Independent and works as     Subjective Evaluation    History of Present Illness  Date of onset: 5/15/2019  Mechanism of injury: In  I had a microdisectomy on L5-S1. Now I am having right sided back and buttocks pain. It can be various activities that bother it. I cannot figure out what makes it worse. Does not bother me at night.  No radiating pain into the legs. No pain on the left side. No pain in the hips, knees or ankle      Patient Occupation:  Pain  Current pain ratin  At worst pain ratin  Location: right sided low abck and buttocks  Alleviating factors: walking, stretching, TENS.  Aggravating factors: prolonged positioning  Progression: improved             Objective       Postural Observations    Additional Postural Observation Details  Good pelvic alignment    Palpation     Additional Palpation Details  (R) piriformis    Tenderness     Right Hip   Tenderness in the PSIS.     Neurological Testing     Sensation     Lumbar   Left   Intact: light touch    Right   Intact: light touch    Active Range of Motion     Lumbar   Normal active range of motion  Extension: with pain    Strength/Myotome Testing     Left Hip   Planes of Motion   Flexion: 5  Abduction: 4  External rotation: 4+  Internal rotation: 5    Right Hip   Planes of Motion   Flexion: 5  Abduction: 4  External rotation: 4+  Internal rotation: 5    Left Knee   Flexion: 5  Extension: 5    Right Knee   Flexion: 5  Extension: 5    Left Ankle/Foot   Dorsiflexion: 5    Right Ankle/Foot   Dorsiflexion: 5    Tests       Thoracic   Negative slump.     Lumbar     Left   Negative crossed SLR and passive SLR.     Right   Negative crossed  SLR and passive SLR.     Left Pelvic Girdle/Sacrum   Negative: sacrum compression, sacral spring and thigh thrust.     Right Pelvic Girdle/Sacrum   Negative: sacrum compression, sacral spring and thigh thrust.     Additional Tests Details  (+) R ASLR    Ambulation     Comments   Good gait mechanics    Functional Assessment     Single Leg Stance   Left: 10 seconds  Right: 10 seconds         Assessment & Plan     Assessment  Impairments: abnormal gait, abnormal or restricted ROM, impaired balance, impaired physical strength and pain with function  Assessment details: Pt presents to PT with SI joint pain and piriformis tightness resulting in decreased ability to perform ADL's. Pt would benefit from PT services to address these deficits  Prognosis: good  Functional Limitations: uncomfortable because of pain  Goals  Plan Goals: SHORT TERM GOALS:    3-4 visits   1. Pt will be compliant with HEP.   2. Pt will report no radicular sx's into R buttocks, groin, or lower extremity.  3. Pt will demonstrate 5/5 MMT or greater in (R) LE's in order to improve gait mechanics and protect lumbar spine.  4. Pt will have normal pelvic and sacral alignment and negative SI joint special test findings.       LONG TERM GOALS:   6-8 visits  1. Pt will score 10% or less on Back Index.  2. Pt will report minimal-no tenderness over (R) SI joint  3. Pt will report pain as 0/10 or less with all functional activities.        Plan  Therapy options: will be seen for skilled physical therapy services  Planned modality interventions: cryotherapy, electrical stimulation/Russian stimulation, iontophoresis, TENS, thermotherapy (hydrocollator packs), traction and ultrasound  Planned therapy interventions: abdominal trunk stabilization, balance/weight-bearing training, body mechanics training, flexibility, functional ROM exercises, gait training, home exercise program, joint mobilization, manual therapy, neuromuscular re-education, soft tissue  mobilization, spinal/joint mobilization, strengthening, stretching, therapeutic activities and postural training  Duration in visits: 8  Treatment plan discussed with: patient        Manual Therapy:    -     mins  75588;  Therapeutic Exercise:    10     mins  27753;     Neuromuscular Charleen:    -    mins  63307;    Therapeutic Activity:     -     mins  81243;     Gait Training:      -     mins  60131;     Ultrasound:     -     mins  25735;    Electrical Stimulation:    -     mins  27071 ( );  Dry Needling     -     mins self-pay    Timed Treatment:   10   mins   Total Treatment:     50   mins      PT SIGNATURE: Dominga Swift, AYAAN   DATE TREATMENT INITIATED: 7/15/2019    Initial Certification  Certification Period: 10/13/2019  I certify that the therapy services are furnished while this patient is under my care.  The services outlined above are required by this patient, and will be reviewed every 90 days.     PHYSICIAN: Dea Arreola MD      DATE:     Please sign and return via fax to [phone].. Thank you, [facility].

## 2019-07-22 ENCOUNTER — HOSPITAL ENCOUNTER (OUTPATIENT)
Dept: CARDIOLOGY | Facility: HOSPITAL | Age: 59
Discharge: HOME | End: 2019-07-22
Admitting: FAMILY MEDICINE

## 2019-07-22 VITALS
HEIGHT: 72 IN | BODY MASS INDEX: 28.44 KG/M2 | SYSTOLIC BLOOD PRESSURE: 132 MMHG | DIASTOLIC BLOOD PRESSURE: 89 MMHG | HEART RATE: 79 BPM | WEIGHT: 210 LBS

## 2019-07-22 DIAGNOSIS — H53.9 TRANSIENT VISUAL DISTURBANCE: ICD-10-CM

## 2019-07-22 LAB
AORTIC ROOT ANNULUS: 2.2 CM
ASCENDING AORTA: 3.1 CM
BH CV ECHO MEAS - ACS: 2.2 CM
BH CV ECHO MEAS - AO MAX PG (FULL): 5.1 MMHG
BH CV ECHO MEAS - AO MAX PG: 13.4 MMHG
BH CV ECHO MEAS - AO MEAN PG (FULL): 2.6 MMHG
BH CV ECHO MEAS - AO MEAN PG: 5.9 MMHG
BH CV ECHO MEAS - AO V2 MAX: 183.2 CM/SEC
BH CV ECHO MEAS - AO V2 MEAN: 106.6 CM/SEC
BH CV ECHO MEAS - AO V2 VTI: 37.5 CM
BH CV ECHO MEAS - ASC AORTA: 3.1 CM
BH CV ECHO MEAS - AVA(I,A): 2.2 CM^2
BH CV ECHO MEAS - AVA(I,D): 2.2 CM^2
BH CV ECHO MEAS - AVA(V,A): 2.4 CM^2
BH CV ECHO MEAS - AVA(V,D): 2.4 CM^2
BH CV ECHO MEAS - BSA(HAYCOCK): 2.2 M^2
BH CV ECHO MEAS - BSA: 2.2 M^2
BH CV ECHO MEAS - BZI_BMI: 28.5 KILOGRAMS/M^2
BH CV ECHO MEAS - BZI_METRIC_HEIGHT: 182.9 CM
BH CV ECHO MEAS - BZI_METRIC_WEIGHT: 95.3 KG
BH CV ECHO MEAS - EDV(MOD-SP2): 72 ML
BH CV ECHO MEAS - EDV(MOD-SP4): 78 ML
BH CV ECHO MEAS - EDV(TEICH): 122.1 ML
BH CV ECHO MEAS - EF(CUBED): 86.9 %
BH CV ECHO MEAS - EF(MOD-BP): 67 %
BH CV ECHO MEAS - EF(MOD-SP2): 66.7 %
BH CV ECHO MEAS - EF(MOD-SP4): 66.7 %
BH CV ECHO MEAS - EF(TEICH): 80.4 %
BH CV ECHO MEAS - ESV(MOD-SP2): 24 ML
BH CV ECHO MEAS - ESV(MOD-SP4): 26 ML
BH CV ECHO MEAS - ESV(TEICH): 24 ML
BH CV ECHO MEAS - IVS/LVPW: 1
BH CV ECHO MEAS - IVSD: 1 CM
BH CV ECHO MEAS - LAT PEAK E' VEL: 13 CM/SEC
BH CV ECHO MEAS - LV DIASTOLIC VOL/BSA (35-75): 35.9 ML/M^2
BH CV ECHO MEAS - LV MASS(C)D: 190.7 GRAMS
BH CV ECHO MEAS - LV MASS(C)DI: 87.7 GRAMS/M^2
BH CV ECHO MEAS - LV MAX PG: 8.3 MMHG
BH CV ECHO MEAS - LV MEAN PG: 3.3 MMHG
BH CV ECHO MEAS - LV SYSTOLIC VOL/BSA (12-30): 12 ML/M^2
BH CV ECHO MEAS - LV V1 MAX: 144.5 CM/SEC
BH CV ECHO MEAS - LV V1 MEAN: 81.1 CM/SEC
BH CV ECHO MEAS - LV V1 VTI: 27.4 CM
BH CV ECHO MEAS - LVIDD: 5.1 CM
BH CV ECHO MEAS - LVIDS: 2.6 CM
BH CV ECHO MEAS - LVLD AP2: 8 CM
BH CV ECHO MEAS - LVLD AP4: 7.8 CM
BH CV ECHO MEAS - LVLS AP2: 7.2 CM
BH CV ECHO MEAS - LVLS AP4: 7.6 CM
BH CV ECHO MEAS - LVOT AREA (M): 3.1 CM^2
BH CV ECHO MEAS - LVOT AREA: 3.1 CM^2
BH CV ECHO MEAS - LVOT DIAM: 2 CM
BH CV ECHO MEAS - LVPWD: 1 CM
BH CV ECHO MEAS - MED PEAK E' VEL: 12 CM/SEC
BH CV ECHO MEAS - MV A DUR: 0.1 SEC
BH CV ECHO MEAS - MV A MAX VEL: 89.7 CM/SEC
BH CV ECHO MEAS - MV DEC SLOPE: 457.1 CM/SEC^2
BH CV ECHO MEAS - MV DEC TIME: 0.19 SEC
BH CV ECHO MEAS - MV E MAX VEL: 77.1 CM/SEC
BH CV ECHO MEAS - MV E/A: 0.86
BH CV ECHO MEAS - MV MAX PG: 4.6 MMHG
BH CV ECHO MEAS - MV MEAN PG: 2.3 MMHG
BH CV ECHO MEAS - MV P1/2T MAX VEL: 77.1 CM/SEC
BH CV ECHO MEAS - MV P1/2T: 49.4 MSEC
BH CV ECHO MEAS - MV V2 MAX: 107.2 CM/SEC
BH CV ECHO MEAS - MV V2 MEAN: 72.9 CM/SEC
BH CV ECHO MEAS - MV V2 VTI: 30.8 CM
BH CV ECHO MEAS - MVA P1/2T LCG: 2.9 CM^2
BH CV ECHO MEAS - MVA(P1/2T): 4.5 CM^2
BH CV ECHO MEAS - MVA(VTI): 2.7 CM^2
BH CV ECHO MEAS - PA ACC TIME: 0.12 SEC
BH CV ECHO MEAS - PA MAX PG (FULL): 7.4 MMHG
BH CV ECHO MEAS - PA MAX PG: 9.4 MMHG
BH CV ECHO MEAS - PA PR(ACCEL): 23.5 MMHG
BH CV ECHO MEAS - PA V2 MAX: 153.4 CM/SEC
BH CV ECHO MEAS - PULM A REVS DUR: 0.11 SEC
BH CV ECHO MEAS - PULM A REVS VEL: 31.1 CM/SEC
BH CV ECHO MEAS - PULM DIAS VEL: 51.9 CM/SEC
BH CV ECHO MEAS - PULM S/D: 1.3
BH CV ECHO MEAS - PULM SYS VEL: 65.8 CM/SEC
BH CV ECHO MEAS - PVA(V,A): 1.6 CM^2
BH CV ECHO MEAS - PVA(V,D): 1.6 CM^2
BH CV ECHO MEAS - QP/QS: 0.63
BH CV ECHO MEAS - RAP SYSTOLE: 8 MMHG
BH CV ECHO MEAS - RV MAX PG: 2 MMHG
BH CV ECHO MEAS - RV MEAN PG: 1.2 MMHG
BH CV ECHO MEAS - RV V1 MAX: 70.3 CM/SEC
BH CV ECHO MEAS - RV V1 MEAN: 52.6 CM/SEC
BH CV ECHO MEAS - RV V1 VTI: 15 CM
BH CV ECHO MEAS - RVOT AREA: 3.5 CM^2
BH CV ECHO MEAS - RVOT DIAM: 2.1 CM
BH CV ECHO MEAS - RVSP: 32.6 MMHG
BH CV ECHO MEAS - SI(CUBED): 52.1 ML/M^2
BH CV ECHO MEAS - SI(LVOT): 38.5 ML/M^2
BH CV ECHO MEAS - SI(MOD-SP2): 22.1 ML/M^2
BH CV ECHO MEAS - SI(MOD-SP4): 23.9 ML/M^2
BH CV ECHO MEAS - SI(TEICH): 45.1 ML/M^2
BH CV ECHO MEAS - SUP REN AO DIAM: 1.8 CM
BH CV ECHO MEAS - SV(CUBED): 113.3 ML
BH CV ECHO MEAS - SV(LVOT): 83.8 ML
BH CV ECHO MEAS - SV(MOD-SP2): 48 ML
BH CV ECHO MEAS - SV(MOD-SP4): 52 ML
BH CV ECHO MEAS - SV(RVOT): 52.7 ML
BH CV ECHO MEAS - SV(TEICH): 98.1 ML
BH CV ECHO MEAS - TAPSE (>1.6): 2.7 CM2
BH CV ECHO MEAS - TR MAX VEL: 248 CM/SEC
BH CV ECHO MEASUREMENTS AVERAGE E/E' RATIO: 6.17
BH CV XLRA - RV BASE: 2.9 CM
BH CV XLRA - TDI S': 12 CM/SEC
LEFT ATRIUM VOLUME INDEX: 15 ML/M2
MAXIMAL PREDICTED HEART RATE: 164 BPM
SINUS: 3.3 CM
STJ: 2.9 CM
STRESS TARGET HR: 139 BPM

## 2019-07-22 PROCEDURE — 93306 TTE W/DOPPLER COMPLETE: CPT | Performed by: INTERNAL MEDICINE

## 2019-07-22 PROCEDURE — 93306 TTE W/DOPPLER COMPLETE: CPT

## 2019-07-30 ENCOUNTER — TREATMENT (OUTPATIENT)
Dept: PHYSICAL THERAPY | Facility: CLINIC | Age: 59
End: 2019-07-30

## 2019-07-30 DIAGNOSIS — G89.29 CHRONIC RIGHT SI JOINT PAIN: ICD-10-CM

## 2019-07-30 DIAGNOSIS — M54.50 RIGHT-SIDED LOW BACK PAIN WITHOUT SCIATICA, UNSPECIFIED CHRONICITY: Primary | ICD-10-CM

## 2019-07-30 DIAGNOSIS — M53.3 CHRONIC RIGHT SI JOINT PAIN: ICD-10-CM

## 2019-07-30 DIAGNOSIS — H53.9 VISUAL DISTURBANCE: Primary | ICD-10-CM

## 2019-07-30 PROCEDURE — 97014 ELECTRIC STIMULATION THERAPY: CPT | Performed by: PHYSICAL THERAPIST

## 2019-07-30 PROCEDURE — 97110 THERAPEUTIC EXERCISES: CPT | Performed by: PHYSICAL THERAPIST

## 2019-07-30 PROCEDURE — 97140 MANUAL THERAPY 1/> REGIONS: CPT | Performed by: PHYSICAL THERAPIST

## 2019-07-30 NOTE — PROGRESS NOTES
Physical Therapy Daily Progress Note  Visit: 2    Kenrick Webster reports: I still have pain when I do the ball squeeze and when I bring my knees up to stretch    Subjective     Objective   See Exercise, Manual, and Modality Logs for complete treatment.       Assessment & Plan     Assessment  Assessment details: Pt continued to have pain through session, but not increased from when he arrived. Added hip flexor stretch on right and bridge to HEP    Plan  Plan details: Progress as able        Manual Therapy:    9     mins  87256;  Therapeutic Exercise:    30     mins  72061;     Neuromuscular Charleen:    -    mins  99784;    Therapeutic Activity:     -     mins  39359;     Gait Training:      -     mins  65739;     Ultrasound:     -     mins  81509;    Electrical Stimulation:    15     mins  40126 ( );  Dry Needling     -     mins self-pay    Timed Treatment:   39   mins   Total Treatment:     58   mins    Dominga Swift PT  [place] License #: [id number]    Physical Therapist

## 2019-08-02 ENCOUNTER — APPOINTMENT (OUTPATIENT)
Dept: GENERAL RADIOLOGY | Facility: HOSPITAL | Age: 59
End: 2019-08-02

## 2019-08-02 ENCOUNTER — TREATMENT (OUTPATIENT)
Dept: PHYSICAL THERAPY | Facility: CLINIC | Age: 59
End: 2019-08-02

## 2019-08-02 ENCOUNTER — HOSPITAL ENCOUNTER (EMERGENCY)
Facility: HOSPITAL | Age: 59
Discharge: HOME | End: 2019-08-02
Attending: EMERGENCY MEDICINE | Admitting: EMERGENCY MEDICINE

## 2019-08-02 VITALS
HEIGHT: 73 IN | WEIGHT: 208.3 LBS | HEART RATE: 78 BPM | RESPIRATION RATE: 16 BRPM | BODY MASS INDEX: 27.61 KG/M2 | DIASTOLIC BLOOD PRESSURE: 92 MMHG | SYSTOLIC BLOOD PRESSURE: 130 MMHG | TEMPERATURE: 97.1 F | OXYGEN SATURATION: 98 %

## 2019-08-02 DIAGNOSIS — R53.1 WEAKNESS: Primary | ICD-10-CM

## 2019-08-02 DIAGNOSIS — G89.29 CHRONIC RIGHT SI JOINT PAIN: ICD-10-CM

## 2019-08-02 DIAGNOSIS — R06.00 DYSPNEA, UNSPECIFIED TYPE: ICD-10-CM

## 2019-08-02 DIAGNOSIS — M53.3 CHRONIC RIGHT SI JOINT PAIN: ICD-10-CM

## 2019-08-02 DIAGNOSIS — M54.50 RIGHT-SIDED LOW BACK PAIN WITHOUT SCIATICA, UNSPECIFIED CHRONICITY: Primary | ICD-10-CM

## 2019-08-02 LAB
ALBUMIN SERPL-MCNC: 4.1 G/DL (ref 3.5–5.2)
ALBUMIN/GLOB SERPL: 1.2 G/DL
ALP SERPL-CCNC: 69 U/L (ref 39–117)
ALT SERPL W P-5'-P-CCNC: 15 U/L (ref 1–41)
ANION GAP SERPL CALCULATED.3IONS-SCNC: 10.4 MMOL/L (ref 5–15)
AST SERPL-CCNC: 19 U/L (ref 1–40)
BASOPHILS # BLD AUTO: 0.05 10*3/MM3 (ref 0–0.2)
BASOPHILS NFR BLD AUTO: 0.7 % (ref 0–1.5)
BILIRUB SERPL-MCNC: 1 MG/DL (ref 0.2–1.2)
BUN BLD-MCNC: 6 MG/DL (ref 6–20)
BUN/CREAT SERPL: 4.5 (ref 7–25)
CALCIUM SPEC-SCNC: 9.6 MG/DL (ref 8.6–10.5)
CHLORIDE SERPL-SCNC: 104 MMOL/L (ref 98–107)
CO2 SERPL-SCNC: 27.6 MMOL/L (ref 22–29)
CREAT BLD-MCNC: 1.32 MG/DL (ref 0.76–1.27)
D DIMER PPP FEU-MCNC: 0.32 MCGFEU/ML (ref 0–0.49)
DEPRECATED RDW RBC AUTO: 42.7 FL (ref 37–54)
EOSINOPHIL # BLD AUTO: 0.04 10*3/MM3 (ref 0–0.4)
EOSINOPHIL NFR BLD AUTO: 0.5 % (ref 0.3–6.2)
ERYTHROCYTE [DISTWIDTH] IN BLOOD BY AUTOMATED COUNT: 13 % (ref 12.3–15.4)
GFR SERPL CREATININE-BSD FRML MDRD: 68 ML/MIN/1.73
GLOBULIN UR ELPH-MCNC: 3.3 GM/DL
GLUCOSE BLD-MCNC: 123 MG/DL (ref 65–99)
HCT VFR BLD AUTO: 45.5 % (ref 37.5–51)
HGB BLD-MCNC: 15.1 G/DL (ref 13–17.7)
IMM GRANULOCYTES # BLD AUTO: 0.01 10*3/MM3 (ref 0–0.05)
IMM GRANULOCYTES NFR BLD AUTO: 0.1 % (ref 0–0.5)
LYMPHOCYTES # BLD AUTO: 0.86 10*3/MM3 (ref 0.7–3.1)
LYMPHOCYTES NFR BLD AUTO: 11.7 % (ref 19.6–45.3)
MCH RBC QN AUTO: 29.7 PG (ref 26.6–33)
MCHC RBC AUTO-ENTMCNC: 33.2 G/DL (ref 31.5–35.7)
MCV RBC AUTO: 89.4 FL (ref 79–97)
MONOCYTES # BLD AUTO: 0.41 10*3/MM3 (ref 0.1–0.9)
MONOCYTES NFR BLD AUTO: 5.6 % (ref 5–12)
NEUTROPHILS # BLD AUTO: 5.96 10*3/MM3 (ref 1.7–7)
NEUTROPHILS NFR BLD AUTO: 81.4 % (ref 42.7–76)
NRBC BLD AUTO-RTO: 0 /100 WBC (ref 0–0.2)
NT-PROBNP SERPL-MCNC: 37.3 PG/ML (ref 5–900)
PLATELET # BLD AUTO: 305 10*3/MM3 (ref 140–450)
PMV BLD AUTO: 9.2 FL (ref 6–12)
POTASSIUM BLD-SCNC: 3.4 MMOL/L (ref 3.5–5.2)
PROT SERPL-MCNC: 7.4 G/DL (ref 6–8.5)
RBC # BLD AUTO: 5.09 10*6/MM3 (ref 4.14–5.8)
SODIUM BLD-SCNC: 142 MMOL/L (ref 136–145)
TROPONIN T SERPL-MCNC: <0.01 NG/ML (ref 0–0.03)
WBC NRBC COR # BLD: 7.33 10*3/MM3 (ref 3.4–10.8)

## 2019-08-02 PROCEDURE — 99284 EMERGENCY DEPT VISIT MOD MDM: CPT

## 2019-08-02 PROCEDURE — 84484 ASSAY OF TROPONIN QUANT: CPT | Performed by: PHYSICIAN ASSISTANT

## 2019-08-02 PROCEDURE — PTNOCHG PR CUSTOM PT NO CHARGE VISIT: Performed by: PHYSICAL THERAPIST

## 2019-08-02 PROCEDURE — 85025 COMPLETE CBC W/AUTO DIFF WBC: CPT | Performed by: PHYSICIAN ASSISTANT

## 2019-08-02 PROCEDURE — 80053 COMPREHEN METABOLIC PANEL: CPT | Performed by: PHYSICIAN ASSISTANT

## 2019-08-02 PROCEDURE — 85379 FIBRIN DEGRADATION QUANT: CPT | Performed by: PHYSICIAN ASSISTANT

## 2019-08-02 PROCEDURE — 93010 ELECTROCARDIOGRAM REPORT: CPT | Performed by: INTERNAL MEDICINE

## 2019-08-02 PROCEDURE — 93005 ELECTROCARDIOGRAM TRACING: CPT | Performed by: PHYSICIAN ASSISTANT

## 2019-08-02 PROCEDURE — 71045 X-RAY EXAM CHEST 1 VIEW: CPT

## 2019-08-02 PROCEDURE — 83880 ASSAY OF NATRIURETIC PEPTIDE: CPT | Performed by: PHYSICIAN ASSISTANT

## 2019-08-02 RX ORDER — SODIUM CHLORIDE 0.9 % (FLUSH) 0.9 %
10 SYRINGE (ML) INJECTION AS NEEDED
Status: DISCONTINUED | OUTPATIENT
Start: 2019-08-02 | End: 2019-08-02 | Stop reason: HOSPADM

## 2019-08-02 NOTE — ED PROVIDER NOTES
EMERGENCY DEPARTMENT ENCOUNTER    CHIEF COMPLAINT  Chief Complaint: dyspnea  History given by: patient  History limited by: none  Room Number: 30/30  PMD: Galileo Urbina MD      HPI:  Pt is a 58 y.o. male who presents complaining of generalized weakness and SOA that began a week ago, but worsened today. He denies CP, palpitations, leg pain/swelling, cough, fever, chills, and other complaints. He describes it feels like he cannot catch his breath and has more fatigue when exerted. H/o DVT and no longer taking anticoagulants.    PAST MEDICAL HISTORY  Active Ambulatory Problems     Diagnosis Date Noted   • DVT (deep venous thrombosis) (CMS/HCC)    • Benign essential hypertension    • Renal insufficiency    • Vitamin D deficiency    • Hyperlipidemia    • Gastroesophageal reflux disease without esophagitis 11/25/2015   • Moderate persistent asthma without complication 12/19/2016   • Other chest pain 10/20/2017   • Mass 10/20/2017   • Stress at home 02/27/2018     Resolved Ambulatory Problems     Diagnosis Date Noted   • No Resolved Ambulatory Problems     Past Medical History:   Diagnosis Date   • Anxiety and depression    • Back pain    • Benign essential hypertension    • Bursitis    • Chest pain    • DVT (deep venous thrombosis) (CMS/HCC)    • Elevated cholesterol    • H/O complete eye exam scheduled   • Hyperlipidemia    • Mass    • Renal insufficiency    • Vitamin D deficiency        PAST SURGICAL HISTORY  Past Surgical History:   Procedure Laterality Date   • APPENDECTOMY     • COLONOSCOPY     • ENDOSCOPY     • OTHER SURGICAL HISTORY      gallbladder testing or surgery   • ROTATOR CUFF REPAIR         FAMILY HISTORY  Family History   Problem Relation Age of Onset   • Hypertension Sister    • Kidney disease Sister    • Diabetes Brother        SOCIAL HISTORY  Social History     Socioeconomic History   • Marital status: Single     Spouse name: Not on file   • Number of children: Not on file   • Years of education:  Not on file   • Highest education level: Not on file   Tobacco Use   • Smoking status: Never Smoker   • Smokeless tobacco: Never Used   Substance and Sexual Activity   • Alcohol use: No   • Drug use: No       ALLERGIES  Patient has no known allergies.    REVIEW OF SYSTEMS  Review of Systems   Constitutional: Negative for activity change, appetite change and fever.   HENT: Negative for congestion and sore throat.    Eyes: Negative.    Respiratory: Positive for shortness of breath. Negative for cough.    Cardiovascular: Negative for chest pain and leg swelling.   Gastrointestinal: Negative for abdominal pain, diarrhea and vomiting.   Genitourinary: Negative for decreased urine volume and dysuria.   Musculoskeletal: Negative for neck pain.   Skin: Negative for rash and wound.   Allergic/Immunologic: Negative for immunocompromised state.   Neurological: Positive for weakness (generalized). Negative for numbness and headaches.   Psychiatric/Behavioral: Negative.    All other systems reviewed and are negative.      PHYSICAL EXAM  ED Triage Vitals   Temp Heart Rate Resp BP SpO2   08/02/19 1253 08/02/19 1253 08/02/19 1253 08/02/19 1300 08/02/19 1253   97.1 °F (36.2 °C) 85 18 149/93 98 %      Temp src Heart Rate Source Patient Position BP Location FiO2 (%)   08/02/19 1253 -- -- -- --   Tympanic           Physical Exam   Constitutional: He is oriented to person, place, and time. No distress.   HENT:   Head: Normocephalic and atraumatic.   Eyes: EOM are normal. Pupils are equal, round, and reactive to light.   Neck: Normal range of motion. Neck supple.   Cardiovascular: Normal rate, regular rhythm and normal heart sounds.   Pulmonary/Chest: Effort normal and breath sounds normal. No respiratory distress.   Abdominal: Soft. There is no tenderness. There is no rebound and no guarding.   Musculoskeletal: Normal range of motion. He exhibits no edema.   No calf tenderness   Neurological: He is alert and oriented to person, place,  and time. He has normal sensation and normal strength.   Skin: Skin is warm and dry.   Psychiatric: Mood and affect normal.   Nursing note and vitals reviewed.      LAB RESULTS  Lab Results (last 24 hours)     Procedure Component Value Units Date/Time    CBC & Differential [[id number]] Collected:  08/02/19 1344    Specimen:  Blood Updated:  08/02/19 1401    Narrative:       The following orders were created for panel order CBC & Differential.  Procedure                               Abnormality         Status                     ---------                               -----------         ------                     CBC Auto Differential[[id number]]        Abnormal            Final result                 Please view results for these tests on the individual orders.    Comprehensive Metabolic Panel [[id number]]  (Abnormal) Collected:  08/02/19 1344    Specimen:  Blood Updated:  08/02/19 1428     Glucose 123 mg/dL      BUN 6 mg/dL      Creatinine 1.32 mg/dL      Sodium 142 mmol/L      Potassium 3.4 mmol/L      Chloride 104 mmol/L      CO2 27.6 mmol/L      Calcium 9.6 mg/dL      Total Protein 7.4 g/dL      Albumin 4.10 g/dL      ALT (SGPT) 15 U/L      AST (SGOT) 19 U/L      Alkaline Phosphatase 69 U/L      Total Bilirubin 1.0 mg/dL      eGFR  African Amer 68 mL/min/1.73      Globulin 3.3 gm/dL      A/G Ratio 1.2 g/dL      BUN/Creatinine Ratio 4.5     Anion Gap 10.4 mmol/L     Narrative:       GFR Normal >60  Chronic Kidney Disease <60  Kidney Failure <15    Troponin [[id number]]  (Normal) Collected:  08/02/19 1344    Specimen:  Blood Updated:  08/02/19 1436     Troponin T <0.010 ng/mL     Narrative:       Troponin T Reference Range:  <= 0.03 ng/mL-   Negative for AMI  >0.03 ng/mL-     Abnormal for myocardial necrosis.  Clinicians would have to utilize clinical acumen, EKG, Troponin and serial changes to determine if it is an Acute Myocardial Infarction or myocardial injury due to an underlying chronic condition.     D-dimer,  Quantitative [[id number]]  (Normal) Collected:  08/02/19 1344    Specimen:  Blood Updated:  08/02/19 1421     D-Dimer, Quantitative 0.32 MCGFEU/mL     Narrative:       The Stago D-Dimer test used in conjunction with a clinical pretest probability (PTP) assessment model, has been approved by the FDA to rule out the presence of venous thromboembolism (VTE) in outpatients suspected of deep venous thrombosis (DVT) or pulmonary embolism (PE). The cut-off for negative predictive value is <0.50 MCGFEU/mL.    BNP [[id number]]  (Normal) Collected:  08/02/19 1344    Specimen:  Blood Updated:  08/02/19 1436     proBNP 37.3 pg/mL     Narrative:       Among patients with dyspnea, NT-proBNP is highly sensitive for the detection of acute congestive heart failure. In addition NT-proBNP of <300 pg/ml effectively rules out acute congestive heart failure with 99% negative predictive value.    CBC Auto Differential [[id number]]  (Abnormal) Collected:  08/02/19 1344    Specimen:  Blood Updated:  08/02/19 1401     WBC 7.33 10*3/mm3      RBC 5.09 10*6/mm3      Hemoglobin 15.1 g/dL      Hematocrit 45.5 %      MCV 89.4 fL      MCH 29.7 pg      MCHC 33.2 g/dL      RDW 13.0 %      RDW-SD 42.7 fl      MPV 9.2 fL      Platelets 305 10*3/mm3      Neutrophil % 81.4 %      Lymphocyte % 11.7 %      Monocyte % 5.6 %      Eosinophil % 0.5 %      Basophil % 0.7 %      Immature Grans % 0.1 %      Neutrophils, Absolute 5.96 10*3/mm3      Lymphocytes, Absolute 0.86 10*3/mm3      Monocytes, Absolute 0.41 10*3/mm3      Eosinophils, Absolute 0.04 10*3/mm3      Basophils, Absolute 0.05 10*3/mm3      Immature Grans, Absolute 0.01 10*3/mm3      nRBC 0.0 /100 WBC           I ordered the above labs and reviewed the results    RADIOLOGY  XR Chest 1 View   Final Result   No focal pulmonary consolidation. Tortuous aorta. Follow-up   as clinically indicated.       This report was finalized on 8/2/2019 1:51 PM by Dr. Rigo Carrasquillo M.D.               I ordered the  above noted radiological studies. Interpreted by radiologist. Reviewed by me in PACS.       PROCEDURES  Procedures      PROGRESS AND CONSULTS      1400  Discussed the pt with Dr. Gomez. After performing their own physical exam of the pt, they agreed with the plan of care.    1530  Reviewed negative work-up with patient.  No clear etiology to his symptoms.  We will have him follow-up with his family doctor.        MEDICAL DECISION MAKING  Results were reviewed/discussed with the patient and they were also made aware of online access. Pt also made aware that some labs, such as cultures, will not be resulted during ER visit and follow up with PMD is necessary.     MDM  Number of Diagnoses or Management Options     Amount and/or Complexity of Data Reviewed  Clinical lab tests: reviewed  Tests in the radiology section of CPT®: reviewed  Tests in the medicine section of CPT®: reviewed (See EKG procedure note.)  Independent visualization of images, tracings, or specimens: yes    Patient Progress  Patient progress: stable         DIAGNOSIS  Final diagnoses:   Weakness   Dyspnea, unspecified type       DISPOSITION  Discharged    Latest Documented Vital Signs:  As of 4:16 PM  BP- 130/92 HR- 78 Temp- 97.1 °F (36.2 °C) (Tympanic) O2 sat- 98%    --  Documentation assistance provided by dallas Leos for Eulalio Grande PA-C.  Information recorded by the scribe was done at my direction and has been verified and validated by me.     Nishi Leos  08/02/19 1958       Eulalio Grande PA  08/02/19 1953

## 2019-08-02 NOTE — ED PROVIDER NOTES
Pt presents to the ED complaining of fatigue, generalized weakness, and SOA that began approx a week ago but exacerbated today at a PT visit . Pt states that SOA is worse upon deep breathing.    On exam, pt is   GENERAL: not distressed  HENT: nares patent  EYES: no scleral icterus  CV: regular rhythm, regular rate, no murmur  RESPIRATORY: normal effort, lungs CTAB  ABDOMEN: soft  MUSCULOSKELETAL: no deformity, no lower extremity edema or tendrness  NEURO: alert, GROVE, FC  SKIN: warm, dry    Vital signs and nursing notes reviewed.    EKG          EKG time: 1333  Rhythm/Rate: sinus rate 74  P waves and TN: normal  QRS, axis: PRWP   ST and T waves: normal     Interpreted Contemporaneously by me, independently viewed  changed compared to prior 10/17/17. PRWP is new.    The MALCOM and I have discussed this patient's history, physical exam, and treatment plan.  I have reviewed the documentation and personally had a face to face interaction with the patient. I affirm the documentation and agree with the treatment and plan.  The attached note describes my personal findings.    Documentation assistance provided by dallas Knutson for Dr. Gomez.  Information recorded by the dallas was done at my direction and has been verified and validated by me.       Jenny Knutson  08/02/19 2913       Oswaldo Gomez II, MD  08/02/19 4453

## 2019-08-02 NOTE — ED TRIAGE NOTES
Pt reports SOA and increased weakness started Monday, pt states was at physical therapy for back today and felt worse.

## 2019-08-02 NOTE — PROGRESS NOTES
Physical Therapy Daily Progress Note  Visit: 3    Kenrick Webster reports: It is doing alright. It hurts when I get on the floor to exercise    Subjective     Objective   See Exercise, Manual, and Modality Logs for complete treatment.       Assessment & Plan     Assessment  Assessment details: After 6 min on Nustep to warm-up pt reported he was not feeling well. He states he was in no pain, just fatigued and said something is not right. Pt not diabetic. /82, O2 97% and HR 83. Suggested that he go home and get some rest and if he continued to feel bad or if sx's worsened to call his MD    Educated that he can perform exercises in his bed instead of on the floor when he feels better    Plan  Plan details: Progress per POC as able        Manual Therapy:    -     mins  37313;  Therapeutic Exercise:    -     mins  70960;     Neuromuscular Charleen:    -    mins  32572;    Therapeutic Activity:     -     mins  61168;     Gait Training:      -     mins  33128;     Ultrasound:     -     mins  94034;    Electrical Stimulation:    --     mins  84716 ( );  Dry Needling     --     mins self-pay    Timed Treatment:   -   mins   Total Treatment:     -   mins    Dominga Swift PT  [place] License #: [id number]    Physical Therapist

## 2019-08-12 ENCOUNTER — HOSPITAL ENCOUNTER (OUTPATIENT)
Dept: CARDIOLOGY | Facility: HOSPITAL | Age: 59
Discharge: HOME | End: 2019-08-12
Admitting: FAMILY MEDICINE

## 2019-08-12 DIAGNOSIS — H53.9 VISUAL DISTURBANCE: ICD-10-CM

## 2019-08-12 LAB
BH CV XLRA MEAS LEFT DIST CCA EDV: 29.6 CM/SEC
BH CV XLRA MEAS LEFT DIST CCA PSV: 122 CM/SEC
BH CV XLRA MEAS LEFT DIST ICA EDV: -32.3 CM/SEC
BH CV XLRA MEAS LEFT DIST ICA PSV: -80.4 CM/SEC
BH CV XLRA MEAS LEFT MID ICA EDV: -24.1 CM/SEC
BH CV XLRA MEAS LEFT MID ICA PSV: -58.8 CM/SEC
BH CV XLRA MEAS LEFT PROX CCA EDV: 23.7 CM/SEC
BH CV XLRA MEAS LEFT PROX CCA PSV: 130 CM/SEC
BH CV XLRA MEAS LEFT PROX ECA EDV: -18 CM/SEC
BH CV XLRA MEAS LEFT PROX ECA PSV: -115 CM/SEC
BH CV XLRA MEAS LEFT PROX ICA EDV: -27.6 CM/SEC
BH CV XLRA MEAS LEFT PROX ICA PSV: -87.7 CM/SEC
BH CV XLRA MEAS LEFT PROX SCLA PSV: 228 CM/SEC
BH CV XLRA MEAS LEFT VERTEBRAL A EDV: 15.6 CM/SEC
BH CV XLRA MEAS LEFT VERTEBRAL A PSV: 60.1 CM/SEC
BH CV XLRA MEAS RIGHT DIST CCA EDV: -17 CM/SEC
BH CV XLRA MEAS RIGHT DIST CCA PSV: -95.5 CM/SEC
BH CV XLRA MEAS RIGHT DIST ICA EDV: -23.7 CM/SEC
BH CV XLRA MEAS RIGHT DIST ICA PSV: -78.9 CM/SEC
BH CV XLRA MEAS RIGHT MID ICA EDV: -23.4 CM/SEC
BH CV XLRA MEAS RIGHT MID ICA PSV: -76.4 CM/SEC
BH CV XLRA MEAS RIGHT PROX CCA EDV: -23.7 CM/SEC
BH CV XLRA MEAS RIGHT PROX CCA PSV: -133 CM/SEC
BH CV XLRA MEAS RIGHT PROX ECA EDV: -12.5 CM/SEC
BH CV XLRA MEAS RIGHT PROX ECA PSV: -127 CM/SEC
BH CV XLRA MEAS RIGHT PROX ICA EDV: -18.4 CM/SEC
BH CV XLRA MEAS RIGHT PROX ICA PSV: -92.8 CM/SEC
BH CV XLRA MEAS RIGHT PROX SCLA PSV: 95.5 CM/SEC
BH CV XLRA MEAS RIGHT VERTEBRAL A EDV: 13.4 CM/SEC
BH CV XLRA MEAS RIGHT VERTEBRAL A PSV: 43.4 CM/SEC
LEFT ARM BP: NORMAL MMHG
RIGHT ARM BP: NORMAL MMHG

## 2019-08-12 PROCEDURE — 93880 EXTRACRANIAL BILAT STUDY: CPT

## 2019-08-13 ENCOUNTER — TREATMENT (OUTPATIENT)
Dept: PHYSICAL THERAPY | Facility: CLINIC | Age: 59
End: 2019-08-13

## 2019-08-13 DIAGNOSIS — G89.29 CHRONIC RIGHT SI JOINT PAIN: ICD-10-CM

## 2019-08-13 DIAGNOSIS — M54.50 RIGHT-SIDED LOW BACK PAIN WITHOUT SCIATICA, UNSPECIFIED CHRONICITY: Primary | ICD-10-CM

## 2019-08-13 DIAGNOSIS — M53.3 CHRONIC RIGHT SI JOINT PAIN: ICD-10-CM

## 2019-08-13 PROCEDURE — 97110 THERAPEUTIC EXERCISES: CPT | Performed by: PHYSICAL THERAPIST

## 2019-08-13 PROCEDURE — 97014 ELECTRIC STIMULATION THERAPY: CPT | Performed by: PHYSICAL THERAPIST

## 2019-08-13 PROCEDURE — 97112 NEUROMUSCULAR REEDUCATION: CPT | Performed by: PHYSICAL THERAPIST

## 2019-08-13 NOTE — PROGRESS NOTES
Physical Therapy Daily Progress Note  Visit: 4    Kenrick Webster reports: I ended up going to ER last time I saw you and they said it was just exhaustion. I am feeling better now. 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       Assessment & Plan     Assessment  Assessment details: Pt tolerated treatment well. He was able to progress with standing exercises without difficulty. 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

## 2019-08-20 ENCOUNTER — HOSPITAL ENCOUNTER (OUTPATIENT)
Dept: MRI IMAGING | Facility: HOSPITAL | Age: 59
Discharge: HOME | End: 2019-08-20
Admitting: FAMILY MEDICINE

## 2019-08-20 DIAGNOSIS — H53.9 TRANSIENT VISUAL DISTURBANCE: ICD-10-CM

## 2019-08-20 PROCEDURE — 70551 MRI BRAIN STEM W/O DYE: CPT

## 2019-12-05 ENCOUNTER — OFFICE VISIT (OUTPATIENT)
Dept: RETAIL CLINIC | Facility: CLINIC | Age: 59
End: 2019-12-05

## 2019-12-05 VITALS
OXYGEN SATURATION: 98 % | SYSTOLIC BLOOD PRESSURE: 132 MMHG | HEART RATE: 78 BPM | DIASTOLIC BLOOD PRESSURE: 78 MMHG | TEMPERATURE: 98.4 F | RESPIRATION RATE: 18 BRPM

## 2019-12-05 DIAGNOSIS — J01.00 ACUTE MAXILLARY SINUSITIS, RECURRENCE NOT SPECIFIED: Primary | ICD-10-CM

## 2019-12-05 DIAGNOSIS — R05.9 COUGHING: ICD-10-CM

## 2019-12-05 PROCEDURE — 99213 OFFICE O/P EST LOW 20 MIN: CPT | Performed by: NURSE PRACTITIONER

## 2019-12-05 RX ORDER — AMOXICILLIN AND CLAVULANATE POTASSIUM 875; 125 MG/1; MG/1
1 TABLET, FILM COATED ORAL 2 TIMES DAILY
Qty: 20 TABLET | Refills: 0 | Status: SHIPPED | OUTPATIENT
Start: 2019-12-05 | End: 2019-12-15

## 2019-12-05 NOTE — PROGRESS NOTES
Subjective   Kenrick Webster is a 59 y.o. male.     URI    This is a new problem. The current episode started 1 to 4 weeks ago (10 days). The problem has been gradually worsening (last Tuesday became sick, was much better saturday and sunday then symptoms came back yesterday worse). Maximum temperature: chills, sweats, didnt check. Associated symptoms include congestion, coughing (productive green sputum), ear pain (right side ), headaches, rhinorrhea and sinus pain. Pertinent negatives include no nausea, sore throat or vomiting. Associated symptoms comments: Fatigue  . Treatments tried: nyquil, dayquil, ricola, zycam. The treatment provided no relief.        The following portions of the patient's history were reviewed and updated as appropriate: allergies, current medications, past family history, past medical history, past social history, past surgical history and problem list.    Review of Systems   HENT: Positive for congestion, ear pain (right side ) and rhinorrhea. Negative for sore throat.    Respiratory: Positive for cough (productive green sputum).    Gastrointestinal: Negative for nausea and vomiting.   Musculoskeletal: Negative.    Skin: Negative.    Neurological: Positive for weakness and headache.       Objective   Physical Exam   Constitutional: He is cooperative. No distress.   HENT:   Head: Normocephalic.   Right Ear: Hearing, external ear and ear canal normal. Tympanic membrane is erythematous. A middle ear effusion is present.   Left Ear: Hearing, external ear and ear canal normal. A middle ear effusion is present.   Nose: Mucosal edema, rhinorrhea, sinus tenderness and congestion present. Right sinus exhibits maxillary sinus tenderness. Left sinus exhibits maxillary sinus tenderness.   Mouth/Throat: Oropharynx is clear and moist.   Eyes: Conjunctivae, EOM and lids are normal. Pupils are equal, round, and reactive to light.   Neck: Trachea normal and full passive range of motion without pain.    Cardiovascular: Normal rate, regular rhythm and normal pulses.   Pulmonary/Chest: Effort normal and breath sounds normal.   Abdominal: Bowel sounds are normal. There is tenderness.   Lymphadenopathy:     He has cervical adenopathy.        Right cervical: Superficial cervical adenopathy present.        Left cervical: Superficial cervical adenopathy present.   Neurological: He is alert.   Skin: Skin is warm. Capillary refill takes less than 2 seconds.   Psychiatric: He has a normal mood and affect. His speech is normal and behavior is normal.   Vitals reviewed.        Assessment/Plan   Kenrick was seen today for uri.    Diagnoses and all orders for this visit:    Acute maxillary sinusitis, recurrence not specified    Coughing    Other orders  -     amoxicillin-clavulanate (AUGMENTIN) 875-125 MG per tablet; Take 1 tablet by mouth 2 (Two) Times a Day for 10 days.

## 2019-12-05 NOTE — PATIENT INSTRUCTIONS

## 2019-12-08 ENCOUNTER — OFFICE VISIT (OUTPATIENT)
Dept: RETAIL CLINIC | Facility: CLINIC | Age: 59
End: 2019-12-08

## 2019-12-08 VITALS
TEMPERATURE: 98.2 F | DIASTOLIC BLOOD PRESSURE: 85 MMHG | OXYGEN SATURATION: 98 % | WEIGHT: 207 LBS | BODY MASS INDEX: 27.43 KG/M2 | SYSTOLIC BLOOD PRESSURE: 127 MMHG | RESPIRATION RATE: 18 BRPM | HEART RATE: 79 BPM | HEIGHT: 73 IN

## 2019-12-08 DIAGNOSIS — J01.10 ACUTE NON-RECURRENT FRONTAL SINUSITIS: Primary | ICD-10-CM

## 2019-12-08 PROCEDURE — 99213 OFFICE O/P EST LOW 20 MIN: CPT | Performed by: NURSE PRACTITIONER

## 2019-12-08 NOTE — PROGRESS NOTES
"Subjective   Kenrick Webster is a 59 y.o. male.     /85 (BP Location: Left arm, Patient Position: Sitting, Cuff Size: Adult)   Pulse 79   Temp 98.2 °F (36.8 °C) (Oral)   Resp 18   Ht 185.4 cm (73\")   Wt 93.9 kg (207 lb)   SpO2 98%   BMI 27.31 kg/m²       Sinusitis   This is a new problem. The current episode started 1 to 4 weeks ago. The problem has been gradually improving since onset. There has been no fever. He is experiencing no pain. Associated symptoms include congestion, coughing and sinus pressure. Treatments tried: taking augmentin  The treatment provided no relief.        The following portions of the patient's history were reviewed and updated as appropriate: current medications, past family history, past medical history, past social history, past surgical history and problem list.    Review of Systems   Constitutional: Negative.    HENT: Positive for congestion and sinus pressure.    Eyes: Negative.    Respiratory: Positive for cough.    Cardiovascular: Negative.    Gastrointestinal: Negative.    Endocrine: Negative.    Genitourinary: Negative.    Musculoskeletal: Negative.    Skin: Negative.    Allergic/Immunologic: Negative.    Neurological: Negative.    Hematological: Negative.        Objective   Physical Exam   Constitutional: He is oriented to person, place, and time. He appears well-developed and well-nourished.   HENT:   Head: Normocephalic and atraumatic.   Right Ear: Hearing, tympanic membrane, external ear and ear canal normal.   Left Ear: Hearing, tympanic membrane, external ear and ear canal normal.   Nose: Sinus tenderness and congestion present. Right sinus exhibits frontal sinus tenderness. Left sinus exhibits frontal sinus tenderness.   Mouth/Throat: Uvula is midline, oropharynx is clear and moist and mucous membranes are normal.   Eyes: Pupils are equal, round, and reactive to light. Conjunctivae and EOM are normal.   Cardiovascular: Normal rate, regular rhythm and normal " heart sounds.   Pulmonary/Chest: Effort normal and breath sounds normal.   Abdominal: Soft. Bowel sounds are normal.   Musculoskeletal: Normal range of motion.   Neurological: He is alert and oriented to person, place, and time.   Skin: Skin is warm and dry. Capillary refill takes less than 2 seconds.   Psychiatric: He has a normal mood and affect.   Vitals reviewed.        Assessment/Plan   Kenrick was seen today for sinusitis.    Diagnoses and all orders for this visit:    Acute non-recurrent frontal sinusitis      Pt to continue taking Augmentin as prescribed

## 2019-12-19 ENCOUNTER — OFFICE VISIT (OUTPATIENT)
Dept: FAMILY MEDICINE CLINIC | Facility: CLINIC | Age: 59
End: 2019-12-19

## 2019-12-19 VITALS
BODY MASS INDEX: 29.16 KG/M2 | HEIGHT: 73 IN | TEMPERATURE: 97.8 F | WEIGHT: 220 LBS | RESPIRATION RATE: 16 BRPM | SYSTOLIC BLOOD PRESSURE: 123 MMHG | HEART RATE: 78 BPM | DIASTOLIC BLOOD PRESSURE: 83 MMHG

## 2019-12-19 DIAGNOSIS — I10 BENIGN ESSENTIAL HYPERTENSION: Primary | ICD-10-CM

## 2019-12-19 DIAGNOSIS — J01.00 ACUTE NON-RECURRENT MAXILLARY SINUSITIS: ICD-10-CM

## 2019-12-19 DIAGNOSIS — E78.2 MIXED HYPERLIPIDEMIA: ICD-10-CM

## 2019-12-19 PROCEDURE — 99214 OFFICE O/P EST MOD 30 MIN: CPT | Performed by: FAMILY MEDICINE

## 2019-12-19 RX ORDER — AMOXICILLIN 500 MG/1
500 CAPSULE ORAL 3 TIMES DAILY
Qty: 15 CAPSULE | Refills: 0 | Status: SHIPPED | OUTPATIENT
Start: 2019-12-19 | End: 2019-12-24

## 2019-12-19 RX ORDER — ATORVASTATIN CALCIUM 10 MG/1
10 TABLET, FILM COATED ORAL DAILY
Qty: 90 TABLET | Refills: 1 | Status: SHIPPED | OUTPATIENT
Start: 2019-12-19 | End: 2020-06-29 | Stop reason: SDUPTHER

## 2019-12-19 RX ORDER — AMLODIPINE BESYLATE 10 MG/1
10 TABLET ORAL DAILY
Qty: 90 TABLET | Refills: 1 | Status: SHIPPED | OUTPATIENT
Start: 2019-12-19 | End: 2020-06-29 | Stop reason: SDUPTHER

## 2019-12-19 RX ORDER — LOSARTAN POTASSIUM 50 MG/1
50 TABLET ORAL DAILY
Qty: 90 TABLET | Refills: 1 | Status: SHIPPED | OUTPATIENT
Start: 2019-12-19 | End: 2020-06-29 | Stop reason: SDUPTHER

## 2019-12-19 NOTE — PROGRESS NOTES
Subjective   Kenrick Webster is a 59 y.o. male.     History of Present Illness     Chief Complaint:   Chief Complaint   Patient presents with   • uri     follow up urgent care    • Sinusitis     augmentin stopped after 10 tablets   • Hypertension     med refill    • Hyperlipidemia       Kenrick Webster 59 y.o. male who presents today for Medical Management of the below listed issues and medication refills.  he has a problem list of   Patient Active Problem List   Diagnosis   • DVT (deep venous thrombosis) (CMS/[facility])   • Benign essential hypertension   • Renal insufficiency   • Vitamin D deficiency   • Hyperlipidemia   • Gastroesophageal reflux disease without esophagitis   • Moderate persistent asthma without complication   • Other chest pain   • Mass   • Stress at home   .  Since the last visit, he has overall felt well until 2 recent trips to the [facility] for Sinusitis, given 10 days of Augmentin but only took 5 days worth as he was concerned about his renal issues. Does feel markedly better, although not fully well yet.   he has been compliant with   Current Outpatient Medications:   •  amLODIPine (NORVASC) 10 MG tablet, Take 1 tablet by mouth Daily., Disp: 90 tablet, Rfl: 1  •  amoxicillin (AMOXIL) 500 MG capsule, Take 1 capsule by mouth 3 (Three) Times a Day for 5 days., Disp: 15 capsule, Rfl: 0  •  atorvastatin (LIPITOR) 10 MG tablet, Take 1 tablet by mouth Daily., Disp: 90 tablet, Rfl: 1  •  azelastine (ASTELIN) 0.1 % nasal spray, 2 sprays into each nostril 2 (Two) Times a Day. Use in each nostril as directed, Disp: , Rfl:   •  cholecalciferol (VITAMIN D3) 1000 UNITS tablet, Take 2,000 Units by mouth Daily., Disp: , Rfl:   •  diclofenac (VOLTAREN) 1 % gel gel, Apply 4 g topically to the appropriate area as directed 4 (Four) Times a Day As Needed (ankle pain)., Disp: 100 g, Rfl: 0  •  EPINEPHrine (EPIPEN JR IJ), Inject  as directed As Needed., Disp: , Rfl:   •  fluticasone (FLONASE) 50 MCG/ACT nasal  "spray, 2 sprays into the nostril(s) as directed by provider Daily. Administer 2 sprays in each nostril for each dose., Disp: 3 bottle, Rfl: 1  •  losartan (COZAAR) 50 MG tablet, Take 1 tablet by mouth Daily., Disp: 90 tablet, Rfl: 1.  he denies medication side effects.    All of the other chronic condition(s) listed above are stable w/o issues.    Pt is current with his nephrologist and urologist.    /83   Pulse 78   Temp 97.8 °F (36.6 °C) (Oral)   Resp 16   Ht 185.4 cm (73\")   Wt 99.8 kg (220 lb)   BMI 29.03 kg/m²     Results for orders placed or performed during the hospital encounter of 08/12/19   Duplex Carotid Ultrasound CAR   Result Value Ref Range    Prox CCA .0 cm/sec    Prox CCA PSV -133.0 cm/sec    Prox CCA EDV 23.7 cm/sec    Prox CCA EDV -23.7 cm/sec    Dist CCA .0 cm/sec    Dist CCA PSV -95.5 cm/sec    Dist CCA EDV 29.6 cm/sec    Dist CCA EDV -17.0 cm/sec    Prox ECA PSV -115.0 cm/sec    Prox ECA PSV -127.0 cm/sec    Prox ECA EDV -18.0 cm/sec    Prox ECA EDV -12.5 cm/sec    Prox ICA PSV -87.7 cm/sec    Prox ICA PSV -92.8 cm/sec    Prox ICA EDV -27.6 cm/sec    Prox ICA EDV -18.4 cm/sec    Mid ICA PSV -58.8 cm/sec    Mid ICA PSV -76.4 cm/sec    Mid ICA EDV -24.1 cm/sec    Mid ICA EDV -23.4 cm/sec    Dist ICA PSV -80.4 cm/sec    Dist ICA PSV -78.9 cm/sec    Dist ICA EDV -32.3 cm/sec    Dist ICA EDV -23.7 cm/sec    Vertebral A PSV 60.1 cm/sec    Vertebral A PSV 43.4 cm/sec    Vertebral A EDV 15.6 cm/sec    Vertebral A EDV 13.4 cm/sec    Prox SCLA .0 cm/sec    Prox SCLA PSV 95.5 cm/sec    Left arm /67 mmHg    Right arm /67 mmHg           The following portions of the patient's history were reviewed and updated as appropriate: allergies, current medications, past family history, past medical history, past social history, past surgical history and problem list.    Review of Systems    Objective   Physical Exam    Assessment/Plan   Kenrick was seen today for uri, " sinusitis, hypertension and hyperlipidemia.    Diagnoses and all orders for this visit:    Benign essential hypertension  -     amLODIPine (NORVASC) 10 MG tablet; Take 1 tablet by mouth Daily.  -     losartan (COZAAR) 50 MG tablet; Take 1 tablet by mouth Daily.    Mixed hyperlipidemia  -     atorvastatin (LIPITOR) 10 MG tablet; Take 1 tablet by mouth Daily.    Acute non-recurrent maxillary sinusitis  -     amoxicillin (AMOXIL) 500 MG capsule; Take 1 capsule by mouth 3 (Three) Times a Day for 5 days.

## 2020-06-25 ENCOUNTER — HOSPITAL ENCOUNTER (OUTPATIENT)
Dept: GENERAL RADIOLOGY | Facility: HOSPITAL | Age: 60
Discharge: HOME | End: 2020-06-25
Admitting: INTERNAL MEDICINE

## 2020-06-25 DIAGNOSIS — R05.9 COUGH: ICD-10-CM

## 2020-06-25 PROCEDURE — 71046 X-RAY EXAM CHEST 2 VIEWS: CPT

## 2020-06-29 ENCOUNTER — OFFICE VISIT (OUTPATIENT)
Dept: FAMILY MEDICINE CLINIC | Facility: CLINIC | Age: 60
End: 2020-06-29

## 2020-06-29 VITALS
RESPIRATION RATE: 16 BRPM | TEMPERATURE: 97.1 F | HEART RATE: 84 BPM | DIASTOLIC BLOOD PRESSURE: 84 MMHG | BODY MASS INDEX: 29.69 KG/M2 | SYSTOLIC BLOOD PRESSURE: 139 MMHG | WEIGHT: 224 LBS | HEIGHT: 73 IN

## 2020-06-29 DIAGNOSIS — E78.2 MIXED HYPERLIPIDEMIA: ICD-10-CM

## 2020-06-29 DIAGNOSIS — I10 BENIGN ESSENTIAL HYPERTENSION: Primary | ICD-10-CM

## 2020-06-29 PROCEDURE — 99213 OFFICE O/P EST LOW 20 MIN: CPT | Performed by: FAMILY MEDICINE

## 2020-06-29 RX ORDER — ATORVASTATIN CALCIUM 10 MG/1
10 TABLET, FILM COATED ORAL DAILY
Qty: 90 TABLET | Refills: 1 | Status: SHIPPED | OUTPATIENT
Start: 2020-06-29 | End: 2021-02-24 | Stop reason: SDUPTHER

## 2020-06-29 RX ORDER — LOSARTAN POTASSIUM 50 MG/1
50 TABLET ORAL DAILY
Qty: 90 TABLET | Refills: 1 | Status: SHIPPED | OUTPATIENT
Start: 2020-06-29 | End: 2021-02-24 | Stop reason: SDUPTHER

## 2020-06-29 RX ORDER — AMLODIPINE BESYLATE 10 MG/1
10 TABLET ORAL DAILY
Qty: 90 TABLET | Refills: 1 | Status: SHIPPED | OUTPATIENT
Start: 2020-06-29 | End: 2021-02-24 | Stop reason: SDUPTHER

## 2020-06-29 RX ORDER — MONTELUKAST SODIUM 10 MG/1
10 TABLET ORAL NIGHTLY
COMMUNITY

## 2020-06-29 RX ORDER — FAMOTIDINE 20 MG/1
20 TABLET, FILM COATED ORAL 2 TIMES DAILY
COMMUNITY

## 2020-06-29 NOTE — PROGRESS NOTES
Subjective   Kenrick Webster is a 59 y.o. male.     History of Present Illness     Chief Complaint:   Chief Complaint   Patient presents with   • Hypertension   • Hyperlipidemia       Kenrick Webster 59 y.o. male who presents today for Medical Management of the below listed issues and medication refills.  he has a problem list of   Patient Active Problem List   Diagnosis   • DVT (deep venous thrombosis) (CMS/[facility])   • Benign essential hypertension   • Renal insufficiency   • Vitamin D deficiency   • Hyperlipidemia   • Gastroesophageal reflux disease without esophagitis   • Moderate persistent asthma without complication   • Other chest pain   • Mass   • Stress at home   .  Since the last visit, he has overall felt well.  he has been compliant with   Current Outpatient Medications:   •  amLODIPine (NORVASC) 10 MG tablet, Take 1 tablet by mouth Daily., Disp: 90 tablet, Rfl: 1  •  atorvastatin (LIPITOR) 10 MG tablet, Take 1 tablet by mouth Daily., Disp: 90 tablet, Rfl: 1  •  azelastine (ASTELIN) 0.1 % nasal spray, 2 sprays into each nostril 2 (Two) Times a Day. Use in each nostril as directed, Disp: , Rfl:   •  cholecalciferol (VITAMIN D3) 1000 UNITS tablet, Take 2,000 Units by mouth Daily., Disp: , Rfl:   •  diclofenac (VOLTAREN) 1 % gel gel, Apply 4 g topically to the appropriate area as directed 4 (Four) Times a Day As Needed (ankle pain)., Disp: 100 g, Rfl: 0  •  EPINEPHrine (EPIPEN JR IJ), Inject  as directed As Needed., Disp: , Rfl:   •  famotidine (PEPCID) 20 MG tablet, Take 20 mg by mouth 2 (Two) Times a Day., Disp: , Rfl:   •  fluticasone (FLONASE) 50 MCG/ACT nasal spray, 2 sprays into the nostril(s) as directed by provider Daily. Administer 2 sprays in each nostril for each dose., Disp: 3 bottle, Rfl: 1  •  losartan (Cozaar) 50 MG tablet, Take 1 tablet by mouth Daily., Disp: 90 tablet, Rfl: 1  •  montelukast (SINGULAIR) 10 MG tablet, Take 10 mg by mouth Every Night., Disp: , Rfl: .  he denies medication side  "effects.    All of the other chronic condition(s) listed above are stable w/o issues.    /84   Pulse 84   Temp 97.1 °F (36.2 °C)   Resp 16   Ht 185.4 cm (72.99\")   Wt 102 kg (224 lb)   BMI 29.56 kg/m²     Results for orders placed or performed during the hospital encounter of 08/12/19   Duplex Carotid Ultrasound CAR   Result Value Ref Range    Prox CCA .0 cm/sec    Prox CCA PSV -133.0 cm/sec    Prox CCA EDV 23.7 cm/sec    Prox CCA EDV -23.7 cm/sec    Dist CCA .0 cm/sec    Dist CCA PSV -95.5 cm/sec    Dist CCA EDV 29.6 cm/sec    Dist CCA EDV -17.0 cm/sec    Prox ECA PSV -115.0 cm/sec    Prox ECA PSV -127.0 cm/sec    Prox ECA EDV -18.0 cm/sec    Prox ECA EDV -12.5 cm/sec    Prox ICA PSV -87.7 cm/sec    Prox ICA PSV -92.8 cm/sec    Prox ICA EDV -27.6 cm/sec    Prox ICA EDV -18.4 cm/sec    Mid ICA PSV -58.8 cm/sec    Mid ICA PSV -76.4 cm/sec    Mid ICA EDV -24.1 cm/sec    Mid ICA EDV -23.4 cm/sec    Dist ICA PSV -80.4 cm/sec    Dist ICA PSV -78.9 cm/sec    Dist ICA EDV -32.3 cm/sec    Dist ICA EDV -23.7 cm/sec    Vertebral A PSV 60.1 cm/sec    Vertebral A PSV 43.4 cm/sec    Vertebral A EDV 15.6 cm/sec    Vertebral A EDV 13.4 cm/sec    Prox SCLA .0 cm/sec    Prox SCLA PSV 95.5 cm/sec    Left arm /67 mmHg    Right arm /67 mmHg     Pt sees urology and nephrology and is UTD.      The following portions of the patient's history were reviewed and updated as appropriate: allergies, current medications, past family history, past medical history, past social history, past surgical history and problem list.    Review of Systems   Constitutional: Negative for activity change, chills, fatigue and fever.   Respiratory: Negative for cough and shortness of breath.    Cardiovascular: Negative for chest pain and palpitations.   Gastrointestinal: Negative for abdominal pain.   Endocrine: Negative for cold intolerance.   Psychiatric/Behavioral: Negative for behavioral problems and dysphoric " mood. The patient is not nervous/anxious.        Objective   Physical Exam   Constitutional: He appears well-developed and well-nourished.   Neck: Neck supple. No thyromegaly present.   Cardiovascular: Normal rate and regular rhythm.   No murmur heard.  Pulmonary/Chest: Effort normal and breath sounds normal.   Abdominal: Bowel sounds are normal. There is no tenderness.   Psychiatric: He has a normal mood and affect. His behavior is normal.   Nursing note and vitals reviewed.      Assessment/Plan   Kenrick was seen today for hypertension and hyperlipidemia.    Diagnoses and all orders for this visit:    Benign essential hypertension  -     losartan (Cozaar) 50 MG tablet; Take 1 tablet by mouth Daily.  -     atorvastatin (LIPITOR) 10 MG tablet; Take 1 tablet by mouth Daily.  -     amLODIPine (NORVASC) 10 MG tablet; Take 1 tablet by mouth Daily.  -     Comprehensive metabolic panel  -     Lipid panel  -     CBC and Differential  -     TSH    Mixed hyperlipidemia  -     atorvastatin (LIPITOR) 10 MG tablet; Take 1 tablet by mouth Daily.  -     Lipid panel

## 2020-06-30 LAB
ALBUMIN SERPL-MCNC: 4.5 G/DL (ref 3.5–5.2)
ALBUMIN/GLOB SERPL: 1.6 G/DL
ALP SERPL-CCNC: 72 U/L (ref 39–117)
ALT SERPL-CCNC: 10 U/L (ref 1–41)
AST SERPL-CCNC: 16 U/L (ref 1–40)
BASOPHILS # BLD AUTO: 0.05 10*3/MM3 (ref 0–0.2)
BASOPHILS NFR BLD AUTO: 0.9 % (ref 0–1.5)
BILIRUB SERPL-MCNC: 1.4 MG/DL (ref 0.2–1.2)
BUN SERPL-MCNC: 7 MG/DL (ref 6–20)
BUN/CREAT SERPL: 3.9 (ref 7–25)
CALCIUM SERPL-MCNC: 9.3 MG/DL (ref 8.6–10.5)
CHLORIDE SERPL-SCNC: 104 MMOL/L (ref 98–107)
CHOLEST SERPL-MCNC: 147 MG/DL (ref 0–200)
CO2 SERPL-SCNC: 28.5 MMOL/L (ref 22–29)
CREAT SERPL-MCNC: 1.8 MG/DL (ref 0.76–1.27)
EOSINOPHIL # BLD AUTO: 0.13 10*3/MM3 (ref 0–0.4)
EOSINOPHIL NFR BLD AUTO: 2.4 % (ref 0.3–6.2)
ERYTHROCYTE [DISTWIDTH] IN BLOOD BY AUTOMATED COUNT: 13.8 % (ref 12.3–15.4)
GLOBULIN SER CALC-MCNC: 2.8 GM/DL
GLUCOSE SERPL-MCNC: 98 MG/DL (ref 65–99)
HCT VFR BLD AUTO: 43.3 % (ref 37.5–51)
HDLC SERPL-MCNC: 62 MG/DL (ref 40–60)
HGB BLD-MCNC: 14.7 G/DL (ref 13–17.7)
IMM GRANULOCYTES # BLD AUTO: 0.01 10*3/MM3 (ref 0–0.05)
IMM GRANULOCYTES NFR BLD AUTO: 0.2 % (ref 0–0.5)
LDLC SERPL CALC-MCNC: 75 MG/DL (ref 0–100)
LYMPHOCYTES # BLD AUTO: 1.72 10*3/MM3 (ref 0.7–3.1)
LYMPHOCYTES NFR BLD AUTO: 31.7 % (ref 19.6–45.3)
MCH RBC QN AUTO: 29.6 PG (ref 26.6–33)
MCHC RBC AUTO-ENTMCNC: 33.9 G/DL (ref 31.5–35.7)
MCV RBC AUTO: 87.1 FL (ref 79–97)
MONOCYTES # BLD AUTO: 0.59 10*3/MM3 (ref 0.1–0.9)
MONOCYTES NFR BLD AUTO: 10.9 % (ref 5–12)
NEUTROPHILS # BLD AUTO: 2.92 10*3/MM3 (ref 1.7–7)
NEUTROPHILS NFR BLD AUTO: 53.9 % (ref 42.7–76)
NRBC BLD AUTO-RTO: 0 /100 WBC (ref 0–0.2)
PLATELET # BLD AUTO: 302 10*3/MM3 (ref 140–450)
POTASSIUM SERPL-SCNC: 4 MMOL/L (ref 3.5–5.2)
PROT SERPL-MCNC: 7.3 G/DL (ref 6–8.5)
RBC # BLD AUTO: 4.97 10*6/MM3 (ref 4.14–5.8)
SODIUM SERPL-SCNC: 141 MMOL/L (ref 136–145)
TRIGL SERPL-MCNC: 49 MG/DL (ref 0–150)
TSH SERPL DL<=0.005 MIU/L-ACNC: 1.09 UIU/ML (ref 0.27–4.2)
VLDLC SERPL CALC-MCNC: 9.8 MG/DL
WBC # BLD AUTO: 5.42 10*3/MM3 (ref 3.4–10.8)

## 2020-07-22 ENCOUNTER — TELEPHONE (OUTPATIENT)
Dept: FAMILY MEDICINE CLINIC | Facility: CLINIC | Age: 60
End: 2020-07-22

## 2020-07-22 DIAGNOSIS — D36.9 ADENOMATOUS POLYP: Primary | ICD-10-CM

## 2020-07-22 NOTE — PROGRESS NOTES
Call pt and let him know Dr Tolis Simon called and it looks like he did have an adenomatous polyp in 2014 and is due a repeat c-scope. I ordered the consult and he will be getting a call and needs to do it.

## 2020-09-10 ENCOUNTER — TELEPHONE (OUTPATIENT)
Dept: GASTROENTEROLOGY | Facility: CLINIC | Age: 60
End: 2020-09-10

## 2020-09-29 ENCOUNTER — TELEMEDICINE (OUTPATIENT)
Dept: FAMILY MEDICINE CLINIC | Facility: CLINIC | Age: 60
End: 2020-09-29

## 2020-09-29 DIAGNOSIS — J06.9 ACUTE URI: Primary | ICD-10-CM

## 2020-09-29 PROCEDURE — 99442 PR PHYS/QHP TELEPHONE EVALUATION 11-20 MIN: CPT | Performed by: FAMILY MEDICINE

## 2020-09-29 RX ORDER — AZITHROMYCIN 250 MG/1
TABLET, FILM COATED ORAL
Qty: 6 TABLET | Refills: 0 | Status: SHIPPED | OUTPATIENT
Start: 2020-09-29 | End: 2020-10-06

## 2020-09-29 NOTE — PROGRESS NOTES
Subjective   Kenrick Webster is a 59 y.o. male.     CC: VV for URI-Sx    History of Present Illness     Pt seen today on a VV for 5 day h/o increasing cough (NP), fatigue, and malaise. Denies change to smell/taste, reports no f/c/dyspnea/n/v/d. No sinus p/p. No COVID exposure reported.    The following portions of the patient's history were reviewed and updated as appropriate: allergies, current medications, past family history, past medical history, past social history, past surgical history and problem list.    Review of Systems   Constitutional: Positive for fatigue. Negative for activity change, chills and fever.   HENT: Positive for congestion and sore throat. Negative for sinus pressure.    Respiratory: Positive for cough.    Cardiovascular: Negative for chest pain.   Psychiatric/Behavioral: Negative for dysphoric mood.       Objective   Physical Exam  Constitutional:       General: He is not in acute distress.     Appearance: He is well-developed.   Pulmonary:      Effort: Pulmonary effort is normal.   Neurological:      Mental Status: He is alert and oriented to person, place, and time.   Psychiatric:         Behavior: Behavior normal.         Thought Content: Thought content normal.         Assessment/Plan   Kenrick was seen today for cough, uri, nasal congestion and dizziness.    Diagnoses and all orders for this visit:    Acute URI  -     azithromycin (Zithromax Z-Richard) 250 MG tablet; Take 2 tablets the first day, then 1 tablet daily for 4 days.      Spent  15   minutes with chart and interview and consent for this encounter given by the patient.  You have chosen to receive care through a telehealth visit.  Do you consent to use a TELEPHONE connection for your medical care today? Yes  Unable to complete visit using a video connection to the patient. A phone visit was used to complete this visits. Total time of discussion was 15 minutes.

## 2020-10-06 ENCOUNTER — OFFICE VISIT (OUTPATIENT)
Dept: FAMILY MEDICINE CLINIC | Facility: CLINIC | Age: 60
End: 2020-10-06

## 2020-10-06 DIAGNOSIS — R53.1 WEAKNESS: Primary | ICD-10-CM

## 2020-10-06 PROCEDURE — 99442 PR PHYS/QHP TELEPHONE EVALUATION 11-20 MIN: CPT | Performed by: FAMILY MEDICINE

## 2020-10-06 NOTE — PROGRESS NOTES
"Subjective   Kenrick Webster is a 59 y.o. male.     CC: Telephone Visit for Management of URI/Weakness    History of Present Illness     Pt seen today on Telehealth visit for some continued weakness issues since seen on Telehealth visit 9/29/20 and being treated with a Z-Pack. Pt reports he felt pretty good over the weekend and even went out over the weekend. Pt now reports increase fatigue issues yesterday and went to bed. This am, \"I felt great!\" and went back to work, although got weak again when he got home today. BP today 116/82, HR 90. No f/c/cough.       The following portions of the patient's history were reviewed and updated as appropriate: allergies, current medications, past family history, past medical history, past social history, past surgical history and problem list.    Review of Systems   Constitutional: Negative for appetite change, chills and fever.   Respiratory: Negative for cough.    Cardiovascular: Negative for chest pain.   Neurological: Positive for weakness.   Psychiatric/Behavioral: Negative for dysphoric mood.       Objective   Physical Exam  Constitutional:       General: He is not in acute distress.  Psychiatric:         Behavior: Behavior normal.         Thought Content: Thought content normal.         Assessment/Plan   Kenrick was seen today for weakness.    Diagnoses and all orders for this visit:    Weakness  -     Comprehensive Metabolic Panel  -     CBC & Differential    Spent  15   minutes with chart and interview and consent for this encounter given by the patient.  You have chosen to receive care through a telehealth visit.  Do you consent to use a TELEPHONE connection for your medical care today? Yes    Pt has lost some weight and reports his BPs have been running lower, which I think is causing his issues. Asked pt to check these labs tomorrow and to cut his Norvasc to 5mg Qd and hydrate well.           "

## 2020-10-07 ENCOUNTER — TELEPHONE (OUTPATIENT)
Dept: FAMILY MEDICINE CLINIC | Facility: CLINIC | Age: 60
End: 2020-10-07

## 2020-10-07 DIAGNOSIS — Z20.822 COVID-19 RULED OUT: Primary | ICD-10-CM

## 2020-10-08 LAB
ALBUMIN SERPL-MCNC: 4.5 G/DL (ref 3.5–5.2)
ALBUMIN/GLOB SERPL: 1.9 G/DL
ALP SERPL-CCNC: 82 U/L (ref 39–117)
ALT SERPL-CCNC: 12 U/L (ref 1–41)
AST SERPL-CCNC: 14 U/L (ref 1–40)
BASOPHILS # BLD AUTO: 0.06 10*3/MM3 (ref 0–0.2)
BASOPHILS NFR BLD AUTO: 1 % (ref 0–1.5)
BILIRUB SERPL-MCNC: 1 MG/DL (ref 0–1.2)
BUN SERPL-MCNC: 7 MG/DL (ref 6–20)
BUN/CREAT SERPL: 5.1 (ref 7–25)
CALCIUM SERPL-MCNC: 9.2 MG/DL (ref 8.6–10.5)
CHLORIDE SERPL-SCNC: 104 MMOL/L (ref 98–107)
CO2 SERPL-SCNC: 29.5 MMOL/L (ref 22–29)
CREAT SERPL-MCNC: 1.36 MG/DL (ref 0.76–1.27)
EOSINOPHIL # BLD AUTO: 0.28 10*3/MM3 (ref 0–0.4)
EOSINOPHIL NFR BLD AUTO: 4.7 % (ref 0.3–6.2)
ERYTHROCYTE [DISTWIDTH] IN BLOOD BY AUTOMATED COUNT: 14.2 % (ref 12.3–15.4)
GLOBULIN SER CALC-MCNC: 2.4 GM/DL
GLUCOSE SERPL-MCNC: 91 MG/DL (ref 65–99)
HCT VFR BLD AUTO: 41.6 % (ref 37.5–51)
HGB BLD-MCNC: 14.1 G/DL (ref 13–17.7)
IMM GRANULOCYTES # BLD AUTO: 0.01 10*3/MM3 (ref 0–0.05)
IMM GRANULOCYTES NFR BLD AUTO: 0.2 % (ref 0–0.5)
LYMPHOCYTES # BLD AUTO: 1.99 10*3/MM3 (ref 0.7–3.1)
LYMPHOCYTES NFR BLD AUTO: 33.6 % (ref 19.6–45.3)
MCH RBC QN AUTO: 29.4 PG (ref 26.6–33)
MCHC RBC AUTO-ENTMCNC: 33.9 G/DL (ref 31.5–35.7)
MCV RBC AUTO: 86.8 FL (ref 79–97)
MONOCYTES # BLD AUTO: 0.69 10*3/MM3 (ref 0.1–0.9)
MONOCYTES NFR BLD AUTO: 11.6 % (ref 5–12)
NEUTROPHILS # BLD AUTO: 2.9 10*3/MM3 (ref 1.7–7)
NEUTROPHILS NFR BLD AUTO: 48.9 % (ref 42.7–76)
NRBC BLD AUTO-RTO: 0 /100 WBC (ref 0–0.2)
PLATELET # BLD AUTO: 315 10*3/MM3 (ref 140–450)
POTASSIUM SERPL-SCNC: 4 MMOL/L (ref 3.5–5.2)
PROT SERPL-MCNC: 6.9 G/DL (ref 6–8.5)
RBC # BLD AUTO: 4.79 10*6/MM3 (ref 4.14–5.8)
SODIUM SERPL-SCNC: 142 MMOL/L (ref 136–145)
WBC # BLD AUTO: 5.93 10*3/MM3 (ref 3.4–10.8)

## 2020-10-13 ENCOUNTER — TELEPHONE (OUTPATIENT)
Dept: GASTROENTEROLOGY | Facility: CLINIC | Age: 60
End: 2020-10-13

## 2020-10-14 ENCOUNTER — TELEPHONE (OUTPATIENT)
Dept: FAMILY MEDICINE CLINIC | Facility: CLINIC | Age: 60
End: 2020-10-14

## 2020-10-20 ENCOUNTER — PREP FOR SURGERY (OUTPATIENT)
Dept: OTHER | Facility: HOSPITAL | Age: 60
End: 2020-10-20

## 2020-10-20 DIAGNOSIS — Z12.11 ENCOUNTER FOR SCREENING FOR MALIGNANT NEOPLASM OF COLON: Primary | ICD-10-CM

## 2020-10-20 NOTE — TELEPHONE ENCOUNTER
CALLED AND SPOKE WITH PATIENT.  GAVE HIM SOME DATES FOR [facility].  HE GOING TO CHECK WITH IS  AND CALL BACK.

## 2020-10-21 NOTE — TELEPHONE ENCOUNTER
RETURN CALL FROM PATIENT.  SCHEDULED AT [facility] 01/05/2021 AT 3:15PM - ARRIVE 2PM.  WILL MAIL INSTRUCTIONS.    COVID TEST ON 01/02/2021 ARRIVE 9AM AT [facility].  NEED TO SELF QUARANTINE UNTIL AFTER PROCEDURE.  HE UNDERSTANDS.

## 2020-10-23 ENCOUNTER — PREP FOR SURGERY (OUTPATIENT)
Dept: OTHER | Facility: HOSPITAL | Age: 60
End: 2020-10-23

## 2020-10-23 DIAGNOSIS — Z12.11 ENCOUNTER FOR SCREENING FOR MALIGNANT NEOPLASM OF COLON: Primary | ICD-10-CM

## 2020-12-30 ENCOUNTER — LAB REQUISITION (OUTPATIENT)
Dept: LAB | Facility: HOSPITAL | Age: 60
End: 2020-12-30

## 2020-12-30 DIAGNOSIS — Z00.00 ENCOUNTER FOR GENERAL ADULT MEDICAL EXAMINATION WITHOUT ABNORMAL FINDINGS: ICD-10-CM

## 2021-01-02 PROCEDURE — U0004 COV-19 TEST NON-CDC HGH THRU: HCPCS | Performed by: INTERNAL MEDICINE

## 2021-01-04 LAB — SARS-COV-2 RNA RESP QL NAA+PROBE: NOT DETECTED

## 2021-01-05 ENCOUNTER — OUTSIDE FACILITY SERVICE (OUTPATIENT)
Dept: GASTROENTEROLOGY | Facility: CLINIC | Age: 61
End: 2021-01-05

## 2021-01-05 PROCEDURE — 45380 COLONOSCOPY AND BIOPSY: CPT | Performed by: INTERNAL MEDICINE

## 2021-01-05 PROCEDURE — 88305 TISSUE EXAM BY PATHOLOGIST: CPT | Performed by: INTERNAL MEDICINE

## 2021-01-06 ENCOUNTER — LAB REQUISITION (OUTPATIENT)
Dept: LAB | Facility: HOSPITAL | Age: 61
End: 2021-01-06

## 2021-01-06 DIAGNOSIS — Z12.11 ENCOUNTER FOR SCREENING FOR MALIGNANT NEOPLASM OF COLON: ICD-10-CM

## 2021-01-07 LAB
CYTO UR: NORMAL
LAB AP CASE REPORT: NORMAL
LAB AP CLINICAL INFORMATION: NORMAL
PATH REPORT.FINAL DX SPEC: NORMAL
PATH REPORT.GROSS SPEC: NORMAL

## 2021-01-25 ENCOUNTER — TELEPHONE (OUTPATIENT)
Dept: FAMILY MEDICINE CLINIC | Facility: CLINIC | Age: 61
End: 2021-01-25

## 2021-01-25 NOTE — TELEPHONE ENCOUNTER
PATIENT STATES HE IS SCHEDULED TO GET THE COVID VACCINE SINCE HE WORKS FOR [employer]. HE WOULD LIKE TO KNOW IF THIS IS ADVISED BY DR JOHNSON. PATIENT STATES HE IS TO GET THE VACCINE Friday UNLESS IT IS NOT ADVISABLE.    PLEASE ADVISE: [phone]

## 2021-02-24 ENCOUNTER — OFFICE VISIT (OUTPATIENT)
Dept: FAMILY MEDICINE CLINIC | Facility: CLINIC | Age: 61
End: 2021-02-24

## 2021-02-24 VITALS
WEIGHT: 203 LBS | DIASTOLIC BLOOD PRESSURE: 79 MMHG | HEIGHT: 73 IN | TEMPERATURE: 97 F | HEART RATE: 92 BPM | BODY MASS INDEX: 26.9 KG/M2 | SYSTOLIC BLOOD PRESSURE: 129 MMHG | RESPIRATION RATE: 18 BRPM

## 2021-02-24 DIAGNOSIS — E78.2 MIXED HYPERLIPIDEMIA: Chronic | ICD-10-CM

## 2021-02-24 DIAGNOSIS — R53.1 WEAKNESS: ICD-10-CM

## 2021-02-24 DIAGNOSIS — I10 BENIGN ESSENTIAL HYPERTENSION: Primary | Chronic | ICD-10-CM

## 2021-02-24 DIAGNOSIS — R63.1 POLYDIPSIA: ICD-10-CM

## 2021-02-24 DIAGNOSIS — R42 DIZZINESS: ICD-10-CM

## 2021-02-24 PROCEDURE — 99214 OFFICE O/P EST MOD 30 MIN: CPT | Performed by: FAMILY MEDICINE

## 2021-02-24 RX ORDER — LOSARTAN POTASSIUM 50 MG/1
50 TABLET ORAL DAILY
Qty: 90 TABLET | Refills: 1 | Status: SHIPPED | OUTPATIENT
Start: 2021-02-24 | End: 2021-10-18 | Stop reason: SDUPTHER

## 2021-02-24 RX ORDER — ATORVASTATIN CALCIUM 10 MG/1
10 TABLET, FILM COATED ORAL DAILY
Qty: 90 TABLET | Refills: 1 | Status: SHIPPED | OUTPATIENT
Start: 2021-02-24 | End: 2021-10-18 | Stop reason: SDUPTHER

## 2021-02-24 RX ORDER — MECLIZINE HYDROCHLORIDE 25 MG/1
25 TABLET ORAL 2 TIMES DAILY PRN
Qty: 30 TABLET | Refills: 1 | Status: SHIPPED | OUTPATIENT
Start: 2021-02-24

## 2021-02-24 RX ORDER — AMLODIPINE BESYLATE 10 MG/1
10 TABLET ORAL DAILY
Qty: 90 TABLET | Refills: 1 | Status: SHIPPED | OUTPATIENT
Start: 2021-02-24 | End: 2021-10-19

## 2021-02-24 RX ORDER — EPINEPHRINE 0.3 MG/.3ML
INJECTION SUBCUTANEOUS
COMMUNITY
Start: 2020-12-21

## 2021-02-24 NOTE — PROGRESS NOTES
"Subjective   Kenrick Webster is a 60 y.o. male.     History of Present Illness     Chief Complaint:   Chief Complaint   Patient presents with   • Hypertension     med refill due  - [facility]  - no labs     • Hyperlipidemia   • Dizziness     to discuss x 1 day   - denies blurred vision     • Extremity Weakness     denies any headaches per pt    • Fatigue       Kenrick Webster 60 y.o. male who presents today for Medical Management of the below listed issues and medication refills.  he has a problem list of   Patient Active Problem List   Diagnosis   • DVT (deep venous thrombosis) (CMS/[facility])   • Benign essential hypertension   • Renal insufficiency   • Vitamin D deficiency   • Hyperlipidemia   • Gastroesophageal reflux disease without esophagitis   • Moderate persistent asthma without complication   • Other chest pain   • Mass   • Stress at home   .  Since the last visit, he has had some recently progressive weakness/fatigue issues, along with increased \"dry mouth\" with thirst and some vertigo issues since yesterday. No other sx.  he has been compliant with   Current Outpatient Medications:   •  amLODIPine (NORVASC) 10 MG tablet, Take 1 tablet by mouth Daily., Disp: 90 tablet, Rfl: 1  •  atorvastatin (LIPITOR) 10 MG tablet, Take 1 tablet by mouth Daily., Disp: 90 tablet, Rfl: 1  •  azelastine (ASTELIN) 0.1 % nasal spray, 2 sprays into each nostril 2 (Two) Times a Day. Use in each nostril as directed, Disp: , Rfl:   •  cholecalciferol (VITAMIN D3) 1000 UNITS tablet, Take 2,000 Units by mouth Daily., Disp: , Rfl:   •  diclofenac (VOLTAREN) 1 % gel gel, Apply 4 g topically to the appropriate area as directed 4 (Four) Times a Day As Needed (ankle pain)., Disp: 100 g, Rfl: 0  •  EPINEPHrine (EPIPEN JR IJ), Inject  as directed As Needed., Disp: , Rfl:   •  EPINEPHrine (EPIPEN) 0.3 MG/0.3ML solution auto-injector injection, INJECT 0.3 MILLILITER (0.3 MG) BY INTRAMUSCULAR ROUTE ONCE AS NEEDED FOR ANAPHYLAXIS, Disp: , Rfl:   • " " famotidine (PEPCID) 20 MG tablet, Take 20 mg by mouth 2 (Two) Times a Day., Disp: , Rfl:   •  fluticasone (FLONASE) 50 MCG/ACT nasal spray, 2 sprays into the nostril(s) as directed by provider Daily. Administer 2 sprays in each nostril for each dose., Disp: 3 bottle, Rfl: 1  •  losartan (Cozaar) 50 MG tablet, Take 1 tablet by mouth Daily., Disp: 90 tablet, Rfl: 1  •  meclizine (ANTIVERT) 25 MG tablet, Take 1 tablet by mouth 2 (Two) Times a Day As Needed for Dizziness., Disp: 30 tablet, Rfl: 1  •  montelukast (SINGULAIR) 10 MG tablet, Take 10 mg by mouth Every Night., Disp: , Rfl: .  he denies medication side effects.    All of the other chronic condition(s) listed above are stable w/o issues.    /79   Pulse 92   Temp 97 °F (36.1 °C) (Oral)   Resp 18   Ht 185.4 cm (72.99\")   Wt 92.1 kg (203 lb)   BMI 26.79 kg/m²     Results for orders placed or performed in visit on 01/05/21   Tissue Pathology Exam    Specimen: 1: Large Intestine, Cecum; Tissue    2: Large Intestine, Sigmoid Colon; Tissue   Result Value Ref Range    Case Report       Surgical Pathology Report                         Case: [id number]                                  Authorizing Provider:  Jim Almeida        Collected:           01/05/2021 02:32 PM                                 MD Betzaida                                                                   Ordering Location:     [facility]  Received:            01/06/2021 11:04 AM                                 LABORATORY                                                                   Pathologist:           Gayla Juarez MD                                                          Specimens:   1) - Large Intestine, Cecum, cecal polyp                                                            2) - Large Intestine, Sigmoid Colon, sigmoid polyp                                         Clinical Information       Hx of polyps      Final Diagnosis       1. Colon, Cecum, " Biopsy: Colonic mucosa with    A. Tubular adenoma.    2. Colon, Sigmoid, Biopsy: Colonic mucosa with    A. Developing hyperplastic polyp with irritation and inflammation.     [facility]/e       Gross Description       1. The specimen is received in formalin labeled with the patient's name and further designated 'cecal polyp' is a small fragment of grey-tan tissue.  The specimen is submitted for embedding as received.    2. The specimen is received in formalin labeled with the patient's name and further designated 'sigmoid polyp' is a small fragment of grey-tan tissue.  The specimen is submitted for embedding as received.      Microscopic Description       Performed, incorporated in diagnosis.              The following portions of the patient's history were reviewed and updated as appropriate: allergies, current medications, past family history, past medical history, past social history, past surgical history and problem list.    Review of Systems   Constitutional: Positive for fatigue. Negative for activity change, chills and fever.   Respiratory: Negative for cough.    Cardiovascular: Negative for chest pain.   Neurological: Positive for dizziness and weakness.   Psychiatric/Behavioral: Negative for dysphoric mood.       Objective   Physical Exam  Constitutional:       General: He is not in acute distress.     Appearance: Normal appearance. He is well-developed.   HENT:      Right Ear: Tympanic membrane and ear canal normal.      Left Ear: Tympanic membrane and ear canal normal.   Pulmonary:      Effort: Pulmonary effort is normal.   Neurological:      Mental Status: He is alert and oriented to person, place, and time.   Psychiatric:         Behavior: Behavior normal.         Thought Content: Thought content normal.         Assessment/Plan   Diagnoses and all orders for this visit:    1. Benign essential hypertension (Primary)  -     amLODIPine (NORVASC) 10 MG tablet; Take 1 tablet by mouth Daily.  Dispense: 90 tablet;  Refill: 1  -     atorvastatin (LIPITOR) 10 MG tablet; Take 1 tablet by mouth Daily.  Dispense: 90 tablet; Refill: 1  -     losartan (Cozaar) 50 MG tablet; Take 1 tablet by mouth Daily.  Dispense: 90 tablet; Refill: 1  -     CBC & Differential  -     Comprehensive Metabolic Panel  -     TSH    2. Mixed hyperlipidemia  -     atorvastatin (LIPITOR) 10 MG tablet; Take 1 tablet by mouth Daily.  Dispense: 90 tablet; Refill: 1    3. Weakness    4. Dizziness  -     TSH  -     meclizine (ANTIVERT) 25 MG tablet; Take 1 tablet by mouth 2 (Two) Times a Day As Needed for Dizziness.  Dispense: 30 tablet; Refill: 1    5. Polydipsia  -     CBC & Differential  -     Comprehensive Metabolic Panel  -     Hemoglobin A1c

## 2021-02-25 LAB
ALBUMIN SERPL-MCNC: 4.5 G/DL (ref 3.8–4.9)
ALBUMIN/GLOB SERPL: 1.6 {RATIO} (ref 1.2–2.2)
ALP SERPL-CCNC: 86 IU/L (ref 39–117)
ALT SERPL-CCNC: 14 IU/L (ref 0–44)
AST SERPL-CCNC: 25 IU/L (ref 0–40)
BASOPHILS # BLD AUTO: 0.1 X10E3/UL (ref 0–0.2)
BASOPHILS NFR BLD AUTO: 1 %
BILIRUB SERPL-MCNC: 1.4 MG/DL (ref 0–1.2)
BUN SERPL-MCNC: 13 MG/DL (ref 6–24)
BUN/CREAT SERPL: 9 (ref 9–20)
CALCIUM SERPL-MCNC: 9.7 MG/DL (ref 8.7–10.2)
CHLORIDE SERPL-SCNC: 104 MMOL/L (ref 96–106)
CO2 SERPL-SCNC: 25 MMOL/L (ref 20–29)
CREAT SERPL-MCNC: 1.46 MG/DL (ref 0.76–1.27)
EOSINOPHIL # BLD AUTO: 0.2 X10E3/UL (ref 0–0.4)
EOSINOPHIL NFR BLD AUTO: 3 %
ERYTHROCYTE [DISTWIDTH] IN BLOOD BY AUTOMATED COUNT: 12.5 % (ref 11.6–15.4)
GLOBULIN SER CALC-MCNC: 2.9 G/DL (ref 1.5–4.5)
GLUCOSE SERPL-MCNC: 81 MG/DL (ref 65–99)
HBA1C MFR BLD: 5.7 % (ref 4.8–5.6)
HCT VFR BLD AUTO: 45 % (ref 37.5–51)
HGB BLD-MCNC: 14.9 G/DL (ref 13–17.7)
IMM GRANULOCYTES # BLD AUTO: 0 X10E3/UL (ref 0–0.1)
IMM GRANULOCYTES NFR BLD AUTO: 0 %
LYMPHOCYTES # BLD AUTO: 1.9 X10E3/UL (ref 0.7–3.1)
LYMPHOCYTES NFR BLD AUTO: 32 %
MCH RBC QN AUTO: 29.1 PG (ref 26.6–33)
MCHC RBC AUTO-ENTMCNC: 33.1 G/DL (ref 31.5–35.7)
MCV RBC AUTO: 88 FL (ref 79–97)
MONOCYTES # BLD AUTO: 0.7 X10E3/UL (ref 0.1–0.9)
MONOCYTES NFR BLD AUTO: 13 %
NEUTROPHILS # BLD AUTO: 3 X10E3/UL (ref 1.4–7)
NEUTROPHILS NFR BLD AUTO: 51 %
PLATELET # BLD AUTO: 363 X10E3/UL (ref 150–450)
POTASSIUM SERPL-SCNC: 4.6 MMOL/L (ref 3.5–5.2)
PROT SERPL-MCNC: 7.4 G/DL (ref 6–8.5)
RBC # BLD AUTO: 5.12 X10E6/UL (ref 4.14–5.8)
SODIUM SERPL-SCNC: 144 MMOL/L (ref 134–144)
TSH SERPL DL<=0.005 MIU/L-ACNC: 0.99 UIU/ML (ref 0.45–4.5)
WBC # BLD AUTO: 5.9 X10E3/UL (ref 3.4–10.8)

## 2021-06-22 ENCOUNTER — OFFICE VISIT (OUTPATIENT)
Dept: FAMILY MEDICINE CLINIC | Facility: CLINIC | Age: 61
End: 2021-06-22

## 2021-06-22 VITALS
HEIGHT: 72 IN | RESPIRATION RATE: 16 BRPM | TEMPERATURE: 97.5 F | BODY MASS INDEX: 27.09 KG/M2 | SYSTOLIC BLOOD PRESSURE: 115 MMHG | DIASTOLIC BLOOD PRESSURE: 72 MMHG | HEART RATE: 84 BPM | WEIGHT: 200 LBS

## 2021-06-22 DIAGNOSIS — M79.604 RIGHT LEG PAIN: Primary | ICD-10-CM

## 2021-06-22 DIAGNOSIS — M54.16 LUMBAR RADICULOPATHY: ICD-10-CM

## 2021-06-22 PROCEDURE — 99212 OFFICE O/P EST SF 10 MIN: CPT | Performed by: FAMILY MEDICINE

## 2021-06-22 NOTE — PROGRESS NOTES
"Subjective   Kenrick Webster is a 60 y.o. male.     CC: [facility] f/u for Leg Pain    History of Present Illness     Pt returns today after trip to the [facility] on 6/5/21 for leg pain. That visit was as follows:    60-year-old male presents with 1 day history of right leg aching with some minor tingling.  He says the symptoms is similar to when he had a pinched nerve back in 2009 when he required discectomy.  He is a  and has not done any excessive physical activity.  Denies any back pain.  He has a history of right leg DVT back in 2012.    Final diagnoses:   Right leg pain   Lumbar radiculopathy    Medication Changes  methylPREDNISolone 4 MG Take as directed on package instructions    Pt reports the pain is still there but certainly better and reports he's actually \"doing good\".    The following portions of the patient's history were reviewed and updated as appropriate: allergies, current medications, past family history, past medical history, past social history, past surgical history and problem list.    Review of Systems   Constitutional: Negative for activity change, chills and fever.   Respiratory: Negative for cough.    Cardiovascular: Negative for chest pain.   Musculoskeletal:        Leg pain   Psychiatric/Behavioral: Negative for dysphoric mood.       /72   Pulse 84   Temp 97.5 °F (36.4 °C) (Oral)   Resp 16   Ht 182.9 cm (72\")   Wt 90.7 kg (200 lb)   BMI 27.12 kg/m²     Objective   Physical Exam  Constitutional:       General: He is not in acute distress.     Appearance: He is well-developed.   Pulmonary:      Effort: Pulmonary effort is normal.   Neurological:      Mental Status: He is alert and oriented to person, place, and time.   Psychiatric:         Behavior: Behavior normal.         Thought Content: Thought content normal.     [facility] notes reviewed by me today.    Assessment/Plan   Diagnoses and all orders for this visit:    1. Right leg pain (Primary)    2. Lumbar " radiculopathy    Heat/stretching discussed.

## 2021-07-21 ENCOUNTER — HOSPITAL ENCOUNTER (OUTPATIENT)
Dept: GENERAL RADIOLOGY | Facility: HOSPITAL | Age: 61
Discharge: HOME | End: 2021-07-21

## 2021-07-21 DIAGNOSIS — R52 PAIN: ICD-10-CM

## 2021-07-21 PROCEDURE — 73030 X-RAY EXAM OF SHOULDER: CPT

## 2021-07-21 PROCEDURE — 73060 X-RAY EXAM OF HUMERUS: CPT

## 2021-10-18 RX ORDER — AMLODIPINE BESYLATE 10 MG/1
10 TABLET ORAL DAILY
Qty: 90 TABLET | Refills: 1 | Status: CANCELLED | OUTPATIENT
Start: 2021-10-18

## 2021-10-18 NOTE — PROGRESS NOTES
Subjective   Kenrick Webster is a 60 y.o. male.     History of Present Illness     Chief Complaint:   Chief Complaint   Patient presents with   • Hypertension     med refill due / no labs /[facility]   • Hyperlipidemia   • right lower leg / ankle pain     follow up urgent care        Kenrick Webster 60 y.o. male who presents today for Medical Management of the below listed issues and medication refills. He  has a problem list of   Patient Active Problem List   Diagnosis   • DVT (deep venous thrombosis) (HCC)   • Benign essential hypertension   • Renal insufficiency   • Vitamin D deficiency   • Hyperlipidemia   • Gastroesophageal reflux disease without esophagitis   • Moderate persistent asthma without complication   • Other chest pain   • Mass   • Stress at home   .  Since the last visit, He has overall felt well until a trip to the [facility] [place] which was as follows:    Patient reports he walked a lot over the holiday weekend and yesterday noted to have pain in his R anterior/ posterior upper leg that would sometimes feel it down in his lower leg. He has a history of DVT in 2019 and was treated in 6/21 for low back pain with sciatica. He states his pain isnt shooting down his leg from his back and his low back pain is mild presently. He reports his leg pain would come and go yesterday 10/10 at times and is about 2-3/10 now and aching. He has chronic kidney disease and can only take tylenol for pain.    Final diagnoses:   Hamstring strain, right, initial encounter    Medication Changes  Diclofenac Sodium   4 g Topical 4 Times Daily PRN   4 g Topical 4 Times Daily PRN        he has been compliant with   Current Outpatient Medications:   •  amLODIPine (NORVASC) 5 MG tablet, Take 1 tablet by mouth Daily., Disp: 90 tablet, Rfl: 1  •  albuterol sulfate  (90 Base) MCG/ACT inhaler, Inhale 2 puffs Every 4 (Four) Hours As Needed for Wheezing., Disp: , Rfl:   •  ALLERGY SERUM INJECTION, Inject  under the skin into  "the appropriate area as directed 1 (One) Time., Disp: , Rfl:   •  atorvastatin (LIPITOR) 10 MG tablet, Take 1 tablet by mouth Daily., Disp: 90 tablet, Rfl: 1  •  azelastine (ASTELIN) 0.1 % nasal spray, 2 sprays into each nostril 2 (Two) Times a Day. Use in each nostril as directed, Disp: , Rfl:   •  cholecalciferol (VITAMIN D3) 1000 UNITS tablet, Take 2,000 Units by mouth Daily., Disp: , Rfl:   •  Diclofenac Sodium (VOLTAREN) 1 % gel gel, Apply 4 g topically to the appropriate area as directed 4 (Four) Times a Day As Needed (pain)., Disp: 100 g, Rfl: 0  •  EPINEPHrine (EPIPEN JR IJ), Inject  as directed As Needed., Disp: , Rfl:   •  EPINEPHrine (EPIPEN) 0.3 MG/0.3ML solution auto-injector injection, INJECT 0.3 MILLILITER (0.3 MG) BY INTRAMUSCULAR ROUTE ONCE AS NEEDED FOR ANAPHYLAXIS, Disp: , Rfl:   •  famotidine (PEPCID) 20 MG tablet, Take 20 mg by mouth 2 (Two) Times a Day., Disp: , Rfl:   •  fluticasone (FLONASE) 50 MCG/ACT nasal spray, 2 sprays into the nostril(s) as directed by provider Daily. Administer 2 sprays in each nostril for each dose., Disp: 3 bottle, Rfl: 1  •  losartan (Cozaar) 50 MG tablet, Take 1 tablet by mouth Daily., Disp: 90 tablet, Rfl: 1  •  meclizine (ANTIVERT) 25 MG tablet, Take 1 tablet by mouth 2 (Two) Times a Day As Needed for Dizziness., Disp: 30 tablet, Rfl: 1  •  montelukast (SINGULAIR) 10 MG tablet, Take 10 mg by mouth Every Night., Disp: , Rfl: .  He denies medication side effects.    All of the other chronic condition(s) listed above are stable w/o issues.    /71   Pulse 68   Temp 96.6 °F (35.9 °C) (Oral)   Resp 14   Ht 182.9 cm (72\")   Wt 93.9 kg (207 lb)   BMI 28.07 kg/m²     Results for orders placed or performed in visit on 02/24/21   Comprehensive Metabolic Panel    Specimen: Blood   Result Value Ref Range    Glucose 81 65 - 99 mg/dL    BUN 13 6 - 24 mg/dL    Creatinine 1.46 (H) 0.76 - 1.27 mg/dL    eGFR Non African Am 52 (L) >59 mL/min/1.73    eGFR African Am 60 " >59 mL/min/1.73    BUN/Creatinine Ratio 9 9 - 20    Sodium 144 134 - 144 mmol/L    Potassium 4.6 3.5 - 5.2 mmol/L    Chloride 104 96 - 106 mmol/L    Total CO2 25 20 - 29 mmol/L    Calcium 9.7 8.7 - 10.2 mg/dL    Total Protein 7.4 6.0 - 8.5 g/dL    Albumin 4.5 3.8 - 4.9 g/dL    Globulin 2.9 1.5 - 4.5 g/dL    A/G Ratio 1.6 1.2 - 2.2    Total Bilirubin 1.4 (H) 0.0 - 1.2 mg/dL    Alkaline Phosphatase 86 39 - 117 IU/L    AST (SGOT) 25 0 - 40 IU/L    ALT (SGPT) 14 0 - 44 IU/L   Hemoglobin A1c    Specimen: Blood   Result Value Ref Range    Hemoglobin A1C 5.7 (H) 4.8 - 5.6 %   TSH    Specimen: Blood   Result Value Ref Range    TSH 0.994 0.450 - 4.500 uIU/mL   CBC & Differential    Specimen: Blood   Result Value Ref Range    WBC 5.9 3.4 - 10.8 x10E3/uL    RBC 5.12 4.14 - 5.80 x10E6/uL    Hemoglobin 14.9 13.0 - 17.7 g/dL    Hematocrit 45.0 37.5 - 51.0 %    MCV 88 79 - 97 fL    MCH 29.1 26.6 - 33.0 pg    MCHC 33.1 31.5 - 35.7 g/dL    RDW 12.5 11.6 - 15.4 %    Platelets 363 150 - 450 x10E3/uL    Neutrophil Rel % 51 Not Estab. %    Lymphocyte Rel % 32 Not Estab. %    Monocyte Rel % 13 Not Estab. %    Eosinophil Rel % 3 Not Estab. %    Basophil Rel % 1 Not Estab. %    Neutrophils Absolute 3.0 1.4 - 7.0 x10E3/uL    Lymphocytes Absolute 1.9 0.7 - 3.1 x10E3/uL    Monocytes Absolute 0.7 0.1 - 0.9 x10E3/uL    Eosinophils Absolute 0.2 0.0 - 0.4 x10E3/uL    Basophils Absolute 0.1 0.0 - 0.2 x10E3/uL    Immature Granulocyte Rel % 0 Not Estab. %    Immature Grans Absolute 0.0 0.0 - 0.1 x10E3/uL             The following portions of the patient's history were reviewed and updated as appropriate: allergies, current medications, past family history, past medical history, past social history, past surgical history, and problem list.    Review of Systems   Constitutional: Negative for activity change, chills and fever.   Respiratory: Negative for cough.    Cardiovascular: Negative for chest pain.   Psychiatric/Behavioral: Negative for dysphoric  mood.       Objective   Physical Exam  Constitutional:       General: He is not in acute distress.     Appearance: He is well-developed.   Cardiovascular:      Rate and Rhythm: Normal rate and regular rhythm.   Pulmonary:      Effort: Pulmonary effort is normal.      Breath sounds: Normal breath sounds.   Neurological:      Mental Status: He is alert and oriented to person, place, and time.   Psychiatric:         Behavior: Behavior normal.         Thought Content: Thought content normal.     [facility] [place] note reviewed by me at today's visit.      Assessment/Plan   Diagnoses and all orders for this visit:    1. Benign essential hypertension (Primary)  -     atorvastatin (LIPITOR) 10 MG tablet; Take 1 tablet by mouth Daily.  Dispense: 90 tablet; Refill: 1  -     losartan (Cozaar) 50 MG tablet; Take 1 tablet by mouth Daily.  Dispense: 90 tablet; Refill: 1  -     amLODIPine (NORVASC) 5 MG tablet; Take 1 tablet by mouth Daily.  Dispense: 90 tablet; Refill: 1  -     Comprehensive metabolic panel  -     Lipid panel  -     CBC and Differential  -     TSH    2. Mixed hyperlipidemia  -     atorvastatin (LIPITOR) 10 MG tablet; Take 1 tablet by mouth Daily.  Dispense: 90 tablet; Refill: 1  -     Lipid panel

## 2021-10-19 ENCOUNTER — OFFICE VISIT (OUTPATIENT)
Dept: FAMILY MEDICINE CLINIC | Facility: CLINIC | Age: 61
End: 2021-10-19

## 2021-10-19 VITALS
WEIGHT: 207 LBS | BODY MASS INDEX: 28.04 KG/M2 | HEIGHT: 72 IN | DIASTOLIC BLOOD PRESSURE: 71 MMHG | TEMPERATURE: 96.6 F | HEART RATE: 68 BPM | RESPIRATION RATE: 14 BRPM | SYSTOLIC BLOOD PRESSURE: 109 MMHG

## 2021-10-19 DIAGNOSIS — I10 BENIGN ESSENTIAL HYPERTENSION: Primary | Chronic | ICD-10-CM

## 2021-10-19 DIAGNOSIS — E78.2 MIXED HYPERLIPIDEMIA: Chronic | ICD-10-CM

## 2021-10-19 PROCEDURE — 99214 OFFICE O/P EST MOD 30 MIN: CPT | Performed by: FAMILY MEDICINE

## 2021-10-19 RX ORDER — LOSARTAN POTASSIUM 50 MG/1
50 TABLET ORAL DAILY
Qty: 90 TABLET | Refills: 1 | Status: SHIPPED | OUTPATIENT
Start: 2021-10-19 | End: 2022-05-18

## 2021-10-19 RX ORDER — ATORVASTATIN CALCIUM 10 MG/1
10 TABLET, FILM COATED ORAL DAILY
Qty: 90 TABLET | Refills: 1 | Status: SHIPPED | OUTPATIENT
Start: 2021-10-19 | End: 2022-05-31 | Stop reason: SDUPTHER

## 2021-10-19 RX ORDER — AMLODIPINE BESYLATE 5 MG/1
5 TABLET ORAL DAILY
Qty: 90 TABLET | Refills: 1 | Status: SHIPPED | OUTPATIENT
Start: 2021-10-19 | End: 2022-05-18

## 2021-11-03 ENCOUNTER — TELEPHONE (OUTPATIENT)
Dept: FAMILY MEDICINE CLINIC | Facility: CLINIC | Age: 61
End: 2021-11-03

## 2021-11-03 DIAGNOSIS — Z20.9 EXPOSURE TO COMMUNICABLE DISEASE: Primary | ICD-10-CM

## 2021-11-03 NOTE — TELEPHONE ENCOUNTER
Caller: Kenrick Webster    Relationship: Self    Best call back number: [phone]    What orders are you requesting (i.e. lab or imaging): COVID ANTIBODY TEST    In what timeframe would the patient need to come in: AS SOON AS POSSIBLE    Where will you receive your lab/imaging services: WHERE EVER IS DOING THE ANTIBODY TESTING     Additional notes: PATIENT JUST HAD HIS LABS DRAWN AT [facility] AND THEY TOLD HIM THEY ARE NO LONGER DOING THE ANTIBODY TESTING.

## 2021-11-04 LAB
ALBUMIN SERPL-MCNC: 4.1 G/DL (ref 3.8–4.9)
ALBUMIN/GLOB SERPL: 1.7 {RATIO} (ref 1.2–2.2)
ALP SERPL-CCNC: 78 IU/L (ref 44–121)
ALT SERPL-CCNC: 16 IU/L (ref 0–44)
AST SERPL-CCNC: 23 IU/L (ref 0–40)
BASOPHILS # BLD AUTO: 0.1 X10E3/UL (ref 0–0.2)
BASOPHILS NFR BLD AUTO: 1 %
BILIRUB SERPL-MCNC: 1.2 MG/DL (ref 0–1.2)
BUN SERPL-MCNC: 12 MG/DL (ref 6–24)
BUN/CREAT SERPL: 7 (ref 9–20)
CALCIUM SERPL-MCNC: 9.4 MG/DL (ref 8.7–10.2)
CHLORIDE SERPL-SCNC: 102 MMOL/L (ref 96–106)
CHOLEST SERPL-MCNC: 152 MG/DL (ref 100–199)
CO2 SERPL-SCNC: 26 MMOL/L (ref 20–29)
CREAT SERPL-MCNC: 1.63 MG/DL (ref 0.76–1.27)
EOSINOPHIL # BLD AUTO: 0.2 X10E3/UL (ref 0–0.4)
EOSINOPHIL NFR BLD AUTO: 2 %
ERYTHROCYTE [DISTWIDTH] IN BLOOD BY AUTOMATED COUNT: 13.1 % (ref 11.6–15.4)
GLOBULIN SER CALC-MCNC: 2.4 G/DL (ref 1.5–4.5)
GLUCOSE SERPL-MCNC: 85 MG/DL (ref 65–99)
HCT VFR BLD AUTO: 43.5 % (ref 37.5–51)
HDLC SERPL-MCNC: 65 MG/DL
HGB BLD-MCNC: 14.1 G/DL (ref 13–17.7)
IMM GRANULOCYTES # BLD AUTO: 0 X10E3/UL (ref 0–0.1)
IMM GRANULOCYTES NFR BLD AUTO: 0 %
LDLC SERPL CALC-MCNC: 77 MG/DL (ref 0–99)
LYMPHOCYTES # BLD AUTO: 2 X10E3/UL (ref 0.7–3.1)
LYMPHOCYTES NFR BLD AUTO: 30 %
MCH RBC QN AUTO: 28.6 PG (ref 26.6–33)
MCHC RBC AUTO-ENTMCNC: 32.4 G/DL (ref 31.5–35.7)
MCV RBC AUTO: 88 FL (ref 79–97)
MONOCYTES # BLD AUTO: 0.7 X10E3/UL (ref 0.1–0.9)
MONOCYTES NFR BLD AUTO: 11 %
NEUTROPHILS # BLD AUTO: 3.8 X10E3/UL (ref 1.4–7)
NEUTROPHILS NFR BLD AUTO: 56 %
PLATELET # BLD AUTO: 331 X10E3/UL (ref 150–450)
POTASSIUM SERPL-SCNC: 3.9 MMOL/L (ref 3.5–5.2)
PROT SERPL-MCNC: 6.5 G/DL (ref 6–8.5)
RBC # BLD AUTO: 4.93 X10E6/UL (ref 4.14–5.8)
SODIUM SERPL-SCNC: 139 MMOL/L (ref 134–144)
TRIGL SERPL-MCNC: 46 MG/DL (ref 0–149)
TSH SERPL DL<=0.005 MIU/L-ACNC: 0.92 UIU/ML (ref 0.45–4.5)
VLDLC SERPL CALC-MCNC: 10 MG/DL (ref 5–40)
WBC # BLD AUTO: 6.7 X10E3/UL (ref 3.4–10.8)

## 2022-01-20 ENCOUNTER — OFFICE VISIT (OUTPATIENT)
Dept: FAMILY MEDICINE CLINIC | Facility: CLINIC | Age: 62
End: 2022-01-20

## 2022-01-20 VITALS
BODY MASS INDEX: 27.9 KG/M2 | HEART RATE: 100 BPM | WEIGHT: 206 LBS | SYSTOLIC BLOOD PRESSURE: 123 MMHG | TEMPERATURE: 96.9 F | RESPIRATION RATE: 20 BRPM | HEIGHT: 72 IN | DIASTOLIC BLOOD PRESSURE: 77 MMHG

## 2022-01-20 DIAGNOSIS — M21.41 FLAT FEET, BILATERAL: ICD-10-CM

## 2022-01-20 DIAGNOSIS — I10 BENIGN ESSENTIAL HYPERTENSION: Primary | ICD-10-CM

## 2022-01-20 DIAGNOSIS — S86.811A STRAIN OF CALF MUSCLE, RIGHT, INITIAL ENCOUNTER: ICD-10-CM

## 2022-01-20 DIAGNOSIS — M21.42 FLAT FEET, BILATERAL: ICD-10-CM

## 2022-01-20 PROCEDURE — 99213 OFFICE O/P EST LOW 20 MIN: CPT | Performed by: FAMILY MEDICINE

## 2022-01-20 NOTE — PROGRESS NOTES
"Subjective   Kenrick Webster is a 61 y.o. male.     History of Present Illness     Chief Complaint:   Chief Complaint   Patient presents with   • Hypertension     to discuss lab results   • right lower leg pain     no improvement urgent care 10/2021 per pt        Kenrick Webster 61 y.o. male who presents today for Medical Management of the below listed issues.  He  has a problem list of   Patient Active Problem List   Diagnosis   • DVT (deep venous thrombosis) (HCC)   • Benign essential hypertension   • Renal insufficiency   • Vitamin D deficiency   • Hyperlipidemia   • Gastroesophageal reflux disease without esophagitis   • Moderate persistent asthma without complication   • Other chest pain   • Mass   • Stress at home   .  Since the last visit, He has continued to have RLE pain that [place] [facility] [place] as such:    Patient reports he walked a lot over the holiday weekend and yesterday noted to have pain in his R anterior/ posterior upper leg that would sometimes feel it down in his lower leg. He has a history of DVT in 2019 and was treated in 6/21 for low back pain with sciatica. He states his pain isnt shooting down his leg from his back and his low back pain is mild presently. He reports his leg pain would come and go yesterday 10/10 at times and is about 2-3/10 now and aching. He has chronic kidney disease and can only take tylenol for pain.    Medication Changes  Diclofenac Sodium   4 g Topical 4 Times Daily PRN   4 g Topical 4 Times Daily PRN    In discussion today, his pain is described as \"achy\" and comes/goes, noticing mainly with walking. Pt reports to me that he has Flat Feet and that his shoes \"wear\" on the outside heel. His current discomfort is lateral right calf.      he has been compliant with   Current Outpatient Medications:   •  amLODIPine (NORVASC) 5 MG tablet, Take 1 tablet by mouth Daily., Disp: 90 tablet, Rfl: 1  •  atorvastatin (LIPITOR) 10 MG tablet, Take 1 tablet by mouth " "Daily., Disp: 90 tablet, Rfl: 1  •  azelastine (ASTELIN) 0.1 % nasal spray, 2 sprays into each nostril 2 (Two) Times a Day. Use in each nostril as directed, Disp: , Rfl:   •  cholecalciferol (VITAMIN D3) 1000 UNITS tablet, Take 2,000 Units by mouth Daily., Disp: , Rfl:   •  Diclofenac Sodium (VOLTAREN) 1 % gel gel, Apply 4 g topically to the appropriate area as directed 4 (Four) Times a Day As Needed (pain)., Disp: 100 g, Rfl: 0  •  EPINEPHrine (EPIPEN JR IJ), Inject  as directed As Needed., Disp: , Rfl:   •  EPINEPHrine (EPIPEN) 0.3 MG/0.3ML solution auto-injector injection, INJECT 0.3 MILLILITER (0.3 MG) BY INTRAMUSCULAR ROUTE ONCE AS NEEDED FOR ANAPHYLAXIS, Disp: , Rfl:   •  famotidine (PEPCID) 20 MG tablet, Take 20 mg by mouth 2 (Two) Times a Day., Disp: , Rfl:   •  losartan (Cozaar) 50 MG tablet, Take 1 tablet by mouth Daily., Disp: 90 tablet, Rfl: 1  •  meclizine (ANTIVERT) 25 MG tablet, Take 1 tablet by mouth 2 (Two) Times a Day As Needed for Dizziness., Disp: 30 tablet, Rfl: 1  •  montelukast (SINGULAIR) 10 MG tablet, Take 10 mg by mouth Every Night., Disp: , Rfl: .  He denies medication side effects.    All of the other chronic condition(s) listed above are stable w/o issues.    /77   Pulse 100   Temp 96.9 °F (36.1 °C) (Oral)   Resp 20   Ht 182.9 cm (72\")   Wt 93.4 kg (206 lb)   BMI 27.94 kg/m²     Results for orders placed or performed in visit on 10/19/21   Comprehensive metabolic panel    Specimen: Blood   Result Value Ref Range    Glucose 85 65 - 99 mg/dL    BUN 12 6 - 24 mg/dL    Creatinine 1.63 (H) 0.76 - 1.27 mg/dL    eGFR Non African Am 46 (L) >59 mL/min/1.73    eGFR African Am 53 (L) >59 mL/min/1.73    BUN/Creatinine Ratio 7 (L) 9 - 20    Sodium 139 134 - 144 mmol/L    Potassium 3.9 3.5 - 5.2 mmol/L    Chloride 102 96 - 106 mmol/L    Total CO2 26 20 - 29 mmol/L    Calcium 9.4 8.7 - 10.2 mg/dL    Total Protein 6.5 6.0 - 8.5 g/dL    Albumin 4.1 3.8 - 4.9 g/dL    Globulin 2.4 1.5 - 4.5 " g/dL    A/G Ratio 1.7 1.2 - 2.2    Total Bilirubin 1.2 0.0 - 1.2 mg/dL    Alkaline Phosphatase 78 44 - 121 IU/L    AST (SGOT) 23 0 - 40 IU/L    ALT (SGPT) 16 0 - 44 IU/L   Lipid panel    Specimen: Blood   Result Value Ref Range    Total Cholesterol 152 100 - 199 mg/dL    Triglycerides 46 0 - 149 mg/dL    HDL Cholesterol 65 >39 mg/dL    VLDL Cholesterol Charles 10 5 - 40 mg/dL    LDL Chol Calc (NIH) 77 0 - 99 mg/dL   TSH    Specimen: Blood   Result Value Ref Range    TSH 0.918 0.450 - 4.500 uIU/mL   CBC and Differential    Specimen: Blood   Result Value Ref Range    WBC 6.7 3.4 - 10.8 x10E3/uL    RBC 4.93 4.14 - 5.80 x10E6/uL    Hemoglobin 14.1 13.0 - 17.7 g/dL    Hematocrit 43.5 37.5 - 51.0 %    MCV 88 79 - 97 fL    MCH 28.6 26.6 - 33.0 pg    MCHC 32.4 31.5 - 35.7 g/dL    RDW 13.1 11.6 - 15.4 %    Platelets 331 150 - 450 x10E3/uL    Neutrophil Rel % 56 Not Estab. %    Lymphocyte Rel % 30 Not Estab. %    Monocyte Rel % 11 Not Estab. %    Eosinophil Rel % 2 Not Estab. %    Basophil Rel % 1 Not Estab. %    Neutrophils Absolute 3.8 1.4 - 7.0 x10E3/uL    Lymphocytes Absolute 2.0 0.7 - 3.1 x10E3/uL    Monocytes Absolute 0.7 0.1 - 0.9 x10E3/uL    Eosinophils Absolute 0.2 0.0 - 0.4 x10E3/uL    Basophils Absolute 0.1 0.0 - 0.2 x10E3/uL    Immature Granulocyte Rel % 0 Not Estab. %    Immature Grans Absolute 0.0 0.0 - 0.1 x10E3/uL             The following portions of the patient's history were reviewed and updated as appropriate: allergies, current medications, past family history, past medical history, past social history, past surgical history, and problem list.    Review of Systems   Constitutional: Negative for activity change, chills and fever.   Respiratory: Negative for cough.    Cardiovascular: Negative for chest pain.   Musculoskeletal:        RLE pain   Psychiatric/Behavioral: Negative for dysphoric mood.       Objective   Physical Exam  Constitutional:       General: He is not in acute distress.     Appearance: He is  well-developed.   Pulmonary:      Effort: Pulmonary effort is normal.   Musculoskeletal:        Legs:       Comments: Tender to the touch. No edema.   Neurological:      Mental Status: He is alert and oriented to person, place, and time.   Psychiatric:         Behavior: Behavior normal.         Thought Content: Thought content normal.           Assessment/Plan   Diagnoses and all orders for this visit:    1. Benign essential hypertension (Primary)    2. Flat feet, bilateral  -     Ambulatory Referral to Podiatry    3. Strain of calf muscle, right, initial encounter    Pt's BPs continue to do well and current meds to continue. Pt sees nephrology for his renal insufficiency and is asked to continue.  Feel that pt's recurrent pain is due to his gait. Pt would do well with arch correction (orthotics) and thus will get the Podiatry consult to get the proper mold.

## 2022-05-15 ENCOUNTER — APPOINTMENT (OUTPATIENT)
Dept: CARDIOLOGY | Facility: HOSPITAL | Age: 62
End: 2022-05-15

## 2022-05-15 ENCOUNTER — APPOINTMENT (OUTPATIENT)
Dept: GENERAL RADIOLOGY | Facility: HOSPITAL | Age: 62
End: 2022-05-15

## 2022-05-15 ENCOUNTER — HOSPITAL ENCOUNTER (EMERGENCY)
Facility: HOSPITAL | Age: 62
Discharge: HOME | End: 2022-05-15
Attending: EMERGENCY MEDICINE | Admitting: EMERGENCY MEDICINE

## 2022-05-15 VITALS
HEART RATE: 90 BPM | SYSTOLIC BLOOD PRESSURE: 137 MMHG | OXYGEN SATURATION: 95 % | RESPIRATION RATE: 18 BRPM | DIASTOLIC BLOOD PRESSURE: 91 MMHG | TEMPERATURE: 97.6 F

## 2022-05-15 DIAGNOSIS — M79.604 RIGHT LEG PAIN: Primary | ICD-10-CM

## 2022-05-15 LAB
BH CV LOWER VASCULAR LEFT COMMON FEMORAL AUGMENT: NORMAL
BH CV LOWER VASCULAR LEFT COMMON FEMORAL COMPETENT: NORMAL
BH CV LOWER VASCULAR LEFT COMMON FEMORAL COMPRESS: NORMAL
BH CV LOWER VASCULAR LEFT COMMON FEMORAL PHASIC: NORMAL
BH CV LOWER VASCULAR LEFT COMMON FEMORAL SPONT: NORMAL
BH CV LOWER VASCULAR RIGHT COMMON FEMORAL AUGMENT: NORMAL
BH CV LOWER VASCULAR RIGHT COMMON FEMORAL COMPETENT: NORMAL
BH CV LOWER VASCULAR RIGHT COMMON FEMORAL COMPRESS: NORMAL
BH CV LOWER VASCULAR RIGHT COMMON FEMORAL PHASIC: NORMAL
BH CV LOWER VASCULAR RIGHT COMMON FEMORAL SPONT: NORMAL
BH CV LOWER VASCULAR RIGHT DISTAL FEMORAL COMPRESS: NORMAL
BH CV LOWER VASCULAR RIGHT GASTRONEMIUS COMPRESS: NORMAL
BH CV LOWER VASCULAR RIGHT GREATER SAPH AK COMPRESS: NORMAL
BH CV LOWER VASCULAR RIGHT GREATER SAPH BK COMPRESS: NORMAL
BH CV LOWER VASCULAR RIGHT LESSER SAPH COMPRESS: NORMAL
BH CV LOWER VASCULAR RIGHT MID FEMORAL AUGMENT: NORMAL
BH CV LOWER VASCULAR RIGHT MID FEMORAL COMPETENT: NORMAL
BH CV LOWER VASCULAR RIGHT MID FEMORAL COMPRESS: NORMAL
BH CV LOWER VASCULAR RIGHT MID FEMORAL PHASIC: NORMAL
BH CV LOWER VASCULAR RIGHT MID FEMORAL SPONT: NORMAL
BH CV LOWER VASCULAR RIGHT PERONEAL COMPRESS: NORMAL
BH CV LOWER VASCULAR RIGHT POPLITEAL AUGMENT: NORMAL
BH CV LOWER VASCULAR RIGHT POPLITEAL COMPETENT: NORMAL
BH CV LOWER VASCULAR RIGHT POPLITEAL COMPRESS: NORMAL
BH CV LOWER VASCULAR RIGHT POPLITEAL PHASIC: NORMAL
BH CV LOWER VASCULAR RIGHT POPLITEAL SPONT: NORMAL
BH CV LOWER VASCULAR RIGHT POSTERIOR TIBIAL COMPRESS: NORMAL
BH CV LOWER VASCULAR RIGHT PROFUNDA FEMORAL COMPRESS: NORMAL
BH CV LOWER VASCULAR RIGHT PROXIMAL FEMORAL COMPRESS: NORMAL
BH CV LOWER VASCULAR RIGHT SAPHENOFEMORAL JUNCTION COMPRESS: NORMAL
MAXIMAL PREDICTED HEART RATE: 161 BPM
STRESS TARGET HR: 137 BPM

## 2022-05-15 PROCEDURE — 93971 EXTREMITY STUDY: CPT

## 2022-05-15 PROCEDURE — 99282 EMERGENCY DEPT VISIT SF MDM: CPT

## 2022-05-15 PROCEDURE — 73560 X-RAY EXAM OF KNEE 1 OR 2: CPT

## 2022-05-15 NOTE — ED PROVIDER NOTES
EMERGENCY DEPARTMENT ENCOUNTER    Room Number:  30/30  PCP: Galileo Urbina MD  Historian: Patient  History Limited By: Nothing      HPI  Chief Complaint: Right leg pain  Context: Kenrick Webster is a 61 y.o. male who presents to the ED c/o right leg pain.  Patient states he has history of DVT in his leg about 10 years ago.  Patient currently not on blood thinners.  Patient states he has a .  Has had increasing pain in his right leg for the last 2 months.  Denies back pain.  No redness or rash.  Patient states pain started in his knee.  Now he is having aching down his calf.  Nothing makes it better or worse.  Has had no fevers or chills.  No specific trauma.      Location: Right knee  Radiation: Right calf  Character: Aching  Duration: 2 months  Severity: Moderate  Progression: Waxes and wanes  Aggravating Factors: Nothing  Alleviating Factors: Nothing        MEDICAL RECORD REVIEW    Patient has been seen in the past for right hamstring pain right leg pain lumbar radiculopathy          PAST MEDICAL HISTORY  Active Ambulatory Problems     Diagnosis Date Noted   • DVT (deep venous thrombosis) (HCC)    • Benign essential hypertension    • Renal insufficiency    • Vitamin D deficiency    • Hyperlipidemia    • Gastroesophageal reflux disease without esophagitis 11/25/2015   • Moderate persistent asthma without complication 12/19/2016   • Other chest pain 10/20/2017   • Mass 10/20/2017   • Stress at home 02/27/2018     Resolved Ambulatory Problems     Diagnosis Date Noted   • No Resolved Ambulatory Problems     Past Medical History:   Diagnosis Date   • Anxiety and depression    • Back pain    • Bursitis    • Chest pain    • Colon polyp    • Elevated cholesterol    • H/O complete eye exam scheduled         PAST SURGICAL HISTORY  Past Surgical History:   Procedure Laterality Date   • APPENDECTOMY     • COLONOSCOPY     • ENDOSCOPY     • OTHER SURGICAL HISTORY      gallbladder testing or surgery   • ROTATOR CUFF  REPAIR           FAMILY HISTORY  Family History   Problem Relation Age of Onset   • Hypertension Sister    • Kidney disease Sister    • Diabetes Brother          SOCIAL HISTORY  Social History     Socioeconomic History   • Marital status: Single   Tobacco Use   • Smoking status: Never Smoker   • Smokeless tobacco: Never Used   Substance and Sexual Activity   • Alcohol use: No   • Drug use: No         ALLERGIES  Patient has no known allergies.        REVIEW OF SYSTEMS  Review of Systems   Constitutional: Negative for activity change, appetite change and fever.   HENT: Negative for congestion and sore throat.    Eyes: Negative.    Respiratory: Negative for cough and shortness of breath.    Cardiovascular: Negative for chest pain and leg swelling.   Gastrointestinal: Negative for abdominal pain, diarrhea and vomiting.   Endocrine: Negative.    Genitourinary: Negative for decreased urine volume and dysuria.   Musculoskeletal: Positive for myalgias. Negative for neck pain.   Skin: Negative for rash and wound.   Allergic/Immunologic: Negative.    Neurological: Negative for weakness, numbness and headaches.   Hematological: Negative.    Psychiatric/Behavioral: Negative.    All other systems reviewed and are negative.           PHYSICAL EXAM  ED Triage Vitals [05/15/22 1232]   Temp Heart Rate Resp BP SpO2   97.6 °F (36.4 °C) 90 18 -- 95 %      Temp src Heart Rate Source Patient Position BP Location FiO2 (%)   -- -- -- -- --       Physical Exam  Vitals and nursing note reviewed.   Constitutional:       General: He is not in acute distress.  HENT:      Head: Normocephalic and atraumatic.   Eyes:      Pupils: Pupils are equal, round, and reactive to light.   Cardiovascular:      Rate and Rhythm: Normal rate and regular rhythm.      Heart sounds: Normal heart sounds.   Pulmonary:      Effort: Pulmonary effort is normal. No respiratory distress.      Breath sounds: Normal breath sounds.   Abdominal:      Palpations: Abdomen is  soft.      Tenderness: There is no abdominal tenderness. There is no guarding or rebound.   Musculoskeletal:         General: Tenderness present. Normal range of motion.      Cervical back: Normal range of motion and neck supple.      Comments: Minimal tenderness to right knee.  No back tenderness or calf tenderness   Skin:     General: Skin is warm and dry.   Neurological:      Mental Status: He is alert and oriented to person, place, and time.      Sensory: Sensation is intact. No sensory deficit.      Motor: No weakness.   Psychiatric:         Mood and Affect: Mood and affect normal.       Patient was wearing a face mask when I entered the room and they continued to wear a mask throughout their stay in the ED.  I wore PPE, including  gloves, face mask with shield or face mask with goggles whenever I was in the room with patient.       LAB RESULTS  Recent Results (from the past 24 hour(s))   Duplex Venous Lower Extremity - Right    Collection Time: 05/15/22  2:55 PM   Result Value Ref Range    Target HR (85%) 137 bpm    Max. Pred. HR (100%) 161 bpm    Right Common Femoral Spont Y     Right Common Femoral Phasic Y     Right Common Femoral Augment Y     Right Common Femoral Competent Y     Right Common Femoral Compress C     Right Saphenofemoral Junction Compress C     Right Profunda Femoral Compress C     Right Proximal Femoral Compress C     Right Mid Femoral Spont Y     Right Mid Femoral Phasic Y     Right Mid Femoral Augment Y     Right Mid Femoral Competent Y     Right Mid Femoral Compress C     Right Distal Femoral Compress C     Right Popliteal Spont Y     Right Popliteal Phasic Y     Right Popliteal Augment Y     Right Popliteal Competent Y     Right Popliteal Compress C     Right Posterior Tibial Compress C     Right Peroneal Compress C     Right Gastronemius Compress C     Right Greater Saph AK Compress C     Right Greater Saph BK Compress C     Right Lesser Saph Compress C     Left Common Femoral Spont Y      Left Common Femoral Phasic Y     Left Common Femoral Augment Y     Left Common Femoral Competent Y     Left Common Femoral Compress C        Ordered the above labs and reviewed the results.        RADIOLOGY  XR Knee 1 or 2 View Right   Final Result   .   1. Mild degenerative arthritis of the right knee.   2. No acute process identified.       This report was finalized on 5/15/2022 1:00 PM by Dr. Dickson Morse M.D.               Ordered the above noted radiological studies. Reviewed by me in PACS.         PROCEDURES  Procedures            MEDICATIONS GIVEN IN ER  Medications - No data to display          PROGRESS AND CONSULTS  ED Course as of 05/15/22 1604   Sun May 15, 2022   1601 16:01 EDT  Patient presents with right leg discomfort.  Patient is a  and uses that right leg multiple times.  Patient's Doppler negative for DVT.  X-ray shows arthritis.  Patient will be discharged home.  Patient has orthopedist.  Instructed to follow-up with primary doctor orthopedist and return here for worsening symptoms [SL]      ED Course User Index  [SL] Jerry Garcia MD           MEDICAL DECISION MAKING      MDM  Number of Diagnoses or Management Options     Amount and/or Complexity of Data Reviewed  Tests in the radiology section of CPT®: reviewed and ordered (Doppler negative)               DIAGNOSIS  Final diagnoses:   Right leg pain           DISPOSITION    DISCHARGE    Patient discharged in stable condition.    Reviewed implications of results, diagnosis, meds, responsibility to follow up, warning signs and symptoms of possible worsening, potential complications and reasons to return to ER, including worsening symptoms.    Patient/Family voiced understanding of above instructions.    Discussed plan for discharge, as there is no emergent indication for admission. Patient referred to primary care provider for BP management due to today's BP. Pt/family is agreeable and understands need for follow up and  repeat testing.  Pt is aware that discharge does not mean that nothing is wrong but it indicates no emergency is present that requires admission and they must continue care with follow-up as given below or physician of their choice.     FOLLOW-UP  Galileo Urbina MD  [id number] [id number]  [phone]    Schedule an appointment as soon as possible for a visit            Medication List      No changes were made to your prescriptions during this visit.           Latest Documented Vital Signs:  As of 16:04 EDT  BP- 134/86 HR- 90 Temp- 97.6 °F (36.4 °C) O2 sat- 95%                         Jerry Garcia MD  05/15/22 1601

## 2022-05-18 DIAGNOSIS — I10 BENIGN ESSENTIAL HYPERTENSION: Chronic | ICD-10-CM

## 2022-05-18 RX ORDER — AMLODIPINE BESYLATE 5 MG/1
TABLET ORAL
Qty: 30 TABLET | Refills: 0 | Status: SHIPPED | OUTPATIENT
Start: 2022-05-18 | End: 2022-05-31 | Stop reason: SDUPTHER

## 2022-05-18 RX ORDER — LOSARTAN POTASSIUM 50 MG/1
TABLET ORAL
Qty: 30 TABLET | Refills: 0 | Status: SHIPPED | OUTPATIENT
Start: 2022-05-18 | End: 2022-05-31 | Stop reason: SDUPTHER

## 2022-06-01 ENCOUNTER — OFFICE VISIT (OUTPATIENT)
Dept: FAMILY MEDICINE CLINIC | Facility: CLINIC | Age: 62
End: 2022-06-01

## 2022-06-01 VITALS
SYSTOLIC BLOOD PRESSURE: 131 MMHG | WEIGHT: 196 LBS | DIASTOLIC BLOOD PRESSURE: 88 MMHG | BODY MASS INDEX: 26.55 KG/M2 | HEART RATE: 80 BPM | HEIGHT: 72 IN | RESPIRATION RATE: 16 BRPM | TEMPERATURE: 97.4 F

## 2022-06-01 DIAGNOSIS — E78.2 MIXED HYPERLIPIDEMIA: Chronic | ICD-10-CM

## 2022-06-01 DIAGNOSIS — R63.4 WEIGHT LOSS: ICD-10-CM

## 2022-06-01 DIAGNOSIS — J30.1 NON-SEASONAL ALLERGIC RHINITIS DUE TO POLLEN: ICD-10-CM

## 2022-06-01 DIAGNOSIS — I10 BENIGN ESSENTIAL HYPERTENSION: Primary | Chronic | ICD-10-CM

## 2022-06-01 PROCEDURE — 99214 OFFICE O/P EST MOD 30 MIN: CPT | Performed by: FAMILY MEDICINE

## 2022-06-01 RX ORDER — LOSARTAN POTASSIUM 50 MG/1
50 TABLET ORAL DAILY
Qty: 90 TABLET | Refills: 1 | Status: SHIPPED | OUTPATIENT
Start: 2022-06-01 | End: 2022-10-24 | Stop reason: SDUPTHER

## 2022-06-01 RX ORDER — AMLODIPINE BESYLATE 5 MG/1
5 TABLET ORAL DAILY
Qty: 90 TABLET | Refills: 1 | Status: SHIPPED | OUTPATIENT
Start: 2022-06-01 | End: 2022-10-24 | Stop reason: SDUPTHER

## 2022-06-01 RX ORDER — ATORVASTATIN CALCIUM 10 MG/1
10 TABLET, FILM COATED ORAL DAILY
Qty: 90 TABLET | Refills: 1 | Status: SHIPPED | OUTPATIENT
Start: 2022-06-01 | End: 2022-10-24 | Stop reason: SDUPTHER

## 2022-06-01 RX ORDER — FLUTICASONE PROPIONATE 50 MCG
2 SPRAY, SUSPENSION (ML) NASAL DAILY
Qty: 48 G | Refills: 3 | Status: SHIPPED | OUTPATIENT
Start: 2022-06-01

## 2022-06-02 LAB
ALBUMIN SERPL-MCNC: 4.2 G/DL (ref 3.8–4.9)
ALBUMIN/GLOB SERPL: 1.8 {RATIO} (ref 1.2–2.2)
ALP SERPL-CCNC: 70 IU/L (ref 44–121)
ALT SERPL-CCNC: 13 IU/L (ref 0–44)
AST SERPL-CCNC: 16 IU/L (ref 0–40)
BASOPHILS # BLD AUTO: 0.1 X10E3/UL (ref 0–0.2)
BASOPHILS NFR BLD AUTO: 1 %
BILIRUB SERPL-MCNC: 1.2 MG/DL (ref 0–1.2)
BUN SERPL-MCNC: 9 MG/DL (ref 6–24)
BUN/CREAT SERPL: 6 (ref 9–20)
CALCIUM SERPL-MCNC: 9.9 MG/DL (ref 8.7–10.2)
CHLORIDE SERPL-SCNC: 104 MMOL/L (ref 96–106)
CO2 SERPL-SCNC: 25 MMOL/L (ref 20–29)
CREAT SERPL-MCNC: 1.55 MG/DL (ref 0.76–1.27)
EGFRCR SERPLBLD CKD-EPI 2021: 51 ML/MIN/1.73
EOSINOPHIL # BLD AUTO: 0.1 X10E3/UL (ref 0–0.4)
EOSINOPHIL NFR BLD AUTO: 2 %
ERYTHROCYTE [DISTWIDTH] IN BLOOD BY AUTOMATED COUNT: 13.4 % (ref 11.6–15.4)
GLOBULIN SER CALC-MCNC: 2.3 G/DL (ref 1.5–4.5)
GLUCOSE SERPL-MCNC: 93 MG/DL (ref 65–99)
HCT VFR BLD AUTO: 44 % (ref 37.5–51)
HGB BLD-MCNC: 14.3 G/DL (ref 13–17.7)
IMM GRANULOCYTES # BLD AUTO: 0 X10E3/UL (ref 0–0.1)
IMM GRANULOCYTES NFR BLD AUTO: 0 %
LYMPHOCYTES # BLD AUTO: 1.8 X10E3/UL (ref 0.7–3.1)
LYMPHOCYTES NFR BLD AUTO: 28 %
MCH RBC QN AUTO: 28.8 PG (ref 26.6–33)
MCHC RBC AUTO-ENTMCNC: 32.5 G/DL (ref 31.5–35.7)
MCV RBC AUTO: 89 FL (ref 79–97)
MONOCYTES # BLD AUTO: 0.6 X10E3/UL (ref 0.1–0.9)
MONOCYTES NFR BLD AUTO: 9 %
NEUTROPHILS # BLD AUTO: 3.7 X10E3/UL (ref 1.4–7)
NEUTROPHILS NFR BLD AUTO: 60 %
PLATELET # BLD AUTO: 322 X10E3/UL (ref 150–450)
POTASSIUM SERPL-SCNC: 4.7 MMOL/L (ref 3.5–5.2)
PROT SERPL-MCNC: 6.5 G/DL (ref 6–8.5)
RBC # BLD AUTO: 4.96 X10E6/UL (ref 4.14–5.8)
SODIUM SERPL-SCNC: 143 MMOL/L (ref 134–144)
T4 FREE SERPL-MCNC: 1.29 NG/DL (ref 0.82–1.77)
TSH SERPL DL<=0.005 MIU/L-ACNC: 0.73 UIU/ML (ref 0.45–4.5)
WBC # BLD AUTO: 6.3 X10E3/UL (ref 3.4–10.8)

## 2022-06-16 ENCOUNTER — OFFICE VISIT (OUTPATIENT)
Dept: FAMILY MEDICINE CLINIC | Facility: CLINIC | Age: 62
End: 2022-06-16

## 2022-06-16 VITALS
OXYGEN SATURATION: 97 % | SYSTOLIC BLOOD PRESSURE: 127 MMHG | HEART RATE: 73 BPM | TEMPERATURE: 97 F | WEIGHT: 195 LBS | DIASTOLIC BLOOD PRESSURE: 67 MMHG | BODY MASS INDEX: 26.41 KG/M2 | HEIGHT: 72 IN | RESPIRATION RATE: 16 BRPM

## 2022-06-16 DIAGNOSIS — G89.29 CHRONIC LEFT SHOULDER PAIN: Primary | ICD-10-CM

## 2022-06-16 DIAGNOSIS — M25.512 CHRONIC LEFT SHOULDER PAIN: Primary | ICD-10-CM

## 2022-06-16 DIAGNOSIS — M79.10 MUSCLE PAIN: ICD-10-CM

## 2022-06-16 PROCEDURE — 99214 OFFICE O/P EST MOD 30 MIN: CPT

## 2022-06-16 RX ORDER — PREDNISONE 20 MG/1
TABLET ORAL
COMMUNITY
Start: 2022-06-08 | End: 2022-12-06

## 2022-06-16 RX ORDER — CYCLOBENZAPRINE HCL 5 MG
5 TABLET ORAL 3 TIMES DAILY PRN
Qty: 30 TABLET | Refills: 0 | Status: SHIPPED | OUTPATIENT
Start: 2022-06-16 | End: 2022-06-26

## 2022-06-16 NOTE — PROGRESS NOTES
"Chief Complaint  Shoulder Pain (Pt states he has had shoulder pain for a while.  Left side pain. Pt wants to follow up he states he has had a history of blood clots.  )    Subjective          History of Present Illness    Patient complains of left shoulder pain. The symptoms began about a month ago.  Pain is a result of no known event. Pain is located shoulder region. Discomfort is described as aching. Symptoms are exacerbated by laying on the opposite side.  Evaluation to date: plain films: no fracture or dislocation, and mild degenerative disease of the AC joint. Therapy to date includes: ice, heat and Prednisone with some relief.     The patient saw his Orthopedic Dr. Pablo last week. He was prescribed Prednisone 20 mg regimen for 10 days. He started the Prednisone last night. He reports some improvement.      He  denies medication side effects.    Review of Systems   Constitutional: Negative for appetite change, chills, fatigue and fever.   HENT: Negative for congestion, sinus pressure and trouble swallowing.    Eyes: Negative for visual disturbance.   Respiratory: Negative for cough, chest tightness, shortness of breath and wheezing.    Cardiovascular: Negative for chest pain, palpitations and leg swelling.   Gastrointestinal: Negative for abdominal pain, constipation, diarrhea, nausea and vomiting.   Genitourinary: Negative for dysuria, frequency and urgency.   Musculoskeletal: Positive for arthralgias (left shoulder) and myalgias. Negative for gait problem, joint swelling, neck pain and neck stiffness.   Skin: Negative for rash.   Neurological: Negative for dizziness, syncope, weakness, light-headedness and numbness.   Psychiatric/Behavioral: Negative for dysphoric mood. The patient is not nervous/anxious.         Objective   Vital Signs:   /67   Pulse 73   Temp 97 °F (36.1 °C) (Skin)   Resp 16   Ht 182.9 cm (72\")   Wt 88.5 kg (195 lb)   SpO2 97%   BMI 26.45 kg/m²      BMI is >= 25 and <30. " (Overweight) The following options were offered after discussion;: exercise counseling/recommendations and nutrition counseling/recommendations        Physical Exam  Vitals and nursing note reviewed.   Constitutional:       General: He is not in acute distress.     Appearance: He is well-developed. He is not diaphoretic.   HENT:      Head: Normocephalic and atraumatic.   Eyes:      General: No scleral icterus.     Conjunctiva/sclera: Conjunctivae normal.      Pupils: Pupils are equal, round, and reactive to light.   Neck:      Thyroid: No thyromegaly.      Trachea: No tracheal deviation.   Cardiovascular:      Rate and Rhythm: Normal rate and regular rhythm.      Heart sounds: Normal heart sounds. No murmur heard.    No gallop.   Pulmonary:      Effort: Pulmonary effort is normal.   Abdominal:      Palpations: Abdomen is soft.   Musculoskeletal:         General: Tenderness present. No deformity. Normal range of motion.      Left shoulder: Tenderness present. No swelling or bony tenderness. Normal range of motion. Normal strength. Normal pulse.      Left upper arm: Tenderness present. No swelling, edema or bony tenderness.      Cervical back: Normal range of motion and neck supple.      Thoracic back: Tenderness present. No swelling, spasms or bony tenderness. Normal range of motion.      Comments: Mild tenderness with palpation to left deltoid muscle and left lower trapezius muscle.  Normal range of motion.  No decreased sensation or abnormal pulses in upper extremities.   Lymphadenopathy:      Cervical: No cervical adenopathy.   Skin:     General: Skin is warm and dry.      Findings: No rash.   Neurological:      Mental Status: He is alert and oriented to person, place, and time.      Cranial Nerves: No cranial nerve deficit.      Motor: No tremor, atrophy or abnormal muscle tone.      Coordination: Coordination normal.      Deep Tendon Reflexes: Reflexes normal.      Reflex Scores:       Tricep reflexes are 2+ on  the right side and 2+ on the left side.       Bicep reflexes are 2+ on the right side and 2+ on the left side.       Brachioradialis reflexes are 2+ on the right side and 2+ on the left side.  Psychiatric:         Behavior: Behavior normal.         Thought Content: Thought content normal.         Judgment: Judgment normal.          The following data was reviewed by: BELLO Mann on 06/16/2022:  XR Shoulder 2+ View Left (07/21/2021 10:31)               Assessment and Plan      Diagnoses and all orders for this visit:    1. Chronic left shoulder pain (Primary)  Comments:  Continue Prednisone  RICE therapy  PT referral ordered  Follow-up with Ortho if no improvement  Orders:  -     Cancel: Ambulatory Referral to Physical Therapy Evaluate and treat  -     Ambulatory Referral to Physical Therapy Evaluate and treat    2. Muscle pain  -     Cancel: Ambulatory Referral to Physical Therapy Evaluate and treat  -     cyclobenzaprine (FLEXERIL) 5 MG tablet; Take 1 tablet by mouth 3 (Three) Times a Day As Needed for Muscle Spasms for up to 10 days. Avoid with driving. Do not use alcohol with this prescription  Dispense: 30 tablet; Refill: 0  -     Ambulatory Referral to Physical Therapy Evaluate and treat            Follow Up     Return if symptoms worsen or fail to improve.    Patient was given instructions and counseling regarding his condition or for health maintenance advice. Please see specific information pulled into the AVS if appropriate.     -Follow-up with Dr. Pablo, Orthopedics if no improvement.

## 2022-07-29 ENCOUNTER — TREATMENT (OUTPATIENT)
Dept: PHYSICAL THERAPY | Facility: CLINIC | Age: 62
End: 2022-07-29

## 2022-07-29 DIAGNOSIS — M25.512 CHRONIC LEFT SHOULDER PAIN: Primary | ICD-10-CM

## 2022-07-29 DIAGNOSIS — M19.012 PRIMARY OSTEOARTHRITIS OF LEFT SHOULDER: ICD-10-CM

## 2022-07-29 DIAGNOSIS — G89.29 CHRONIC LEFT SHOULDER PAIN: Primary | ICD-10-CM

## 2022-07-29 PROCEDURE — 97530 THERAPEUTIC ACTIVITIES: CPT | Performed by: PHYSICAL THERAPIST

## 2022-07-29 PROCEDURE — 97161 PT EVAL LOW COMPLEX 20 MIN: CPT | Performed by: PHYSICAL THERAPIST

## 2022-07-29 PROCEDURE — 97110 THERAPEUTIC EXERCISES: CPT | Performed by: PHYSICAL THERAPIST

## 2022-07-29 NOTE — PROGRESS NOTES
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 I have a mild ache that doesn't really ever go away. I haven't had any injections in it and don't want to if I can avoid it. I can feel the pain more reaching over my head. In general, the movement of it is what is bothering me. The pain in my shoulder blade tends to stick around no matter what I do but the pain in the front of my shoulder comes and goes. 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 Occasionally the shoulder bothers me at night but that's mainly when I sleep on it.     I had surgery on the L shoulder in 2018 but it recovered well.      Patient Occupation:   Pain  Current pain rating: 3  At best pain rating: 3  At worst pain ratin  Location: L moves around the deltoid area, shoulder blade  Quality: dull ache and sharp  Relieving factors: heat  Aggravating factors: standing, overhead activity and outstretched reach  Progression: worsening    Hand dominance: right    Treatments  Previous treatment: physical therapy  Patient Goals  Patient goals for therapy: decreased pain  Patient goal: avoid injection           Objective          Palpation   Left   Tenderness of the subscapularis.     Active Range of Motion   Cervical/Thoracic Spine   Cervical    Flexion: WFL  Extension: WFL  Left lateral flexion: WFL  Right lateral flexion: WFL  Left rotation: WFL  Right rotation: WFL  Left Shoulder   Flexion: 165 degrees   Abduction: 180 degrees   External  rotation 0°: 90 degrees   Internal rotation 0°: 90 degrees     Right Shoulder   Flexion: 160 (min) degrees   Abduction: 169 (min) degrees   External rotation 0°: 90 degrees   Internal rotation 0°: 90 degrees     Scapular Mobility   Left Shoulder   Scapular mobility: fair  Scapular Mobility with Shoulder to 90° FF   Scapular winging: minimal  Upward rotation: delayed  Downward rotation: delayed    Right Shoulder   Scapular mobility: good    Strength/Myotome Testing     Left Shoulder     Planes of Motion   Abduction: 5 (pain)   External rotation at 0°: 5   Internal rotation at 0°: 5     Isolated Muscles   Lower trapezius: 4   Middle deltoid: 3+     Right Shoulder     Planes of Motion   Abduction: 5   External rotation at 0°: 5   Internal rotation at 0°: 5     Isolated Muscles   Lower trapezius: 4+   Middle deltoid: 4+     Left Elbow   Flexion: 5 (pain)  Extension: 5 (pain (less than flex and abd))    Right Elbow   Flexion: 5  Extension: 5    Tests     Left Shoulder   Positive empty can and Hawkin's.   Negative Neer's, painful arc and Speed's.     Right Shoulder   Negative Hawkin's, Neer's, painful arc and Speed's.     Additional Tests Details  Weak and painful nolan and empty can    Thoracic Flexibility Comments:  Hypomobility from T5-T12 w/ PIVM assessment          Assessment & Plan     Assessment  Impairments: abnormal muscle firing, impaired physical strength, lacks appropriate home exercise program and pain with function  Functional Limitations: carrying objects, lifting, sleeping, pushing, uncomfortable because of pain, reaching behind back, reaching overhead and unable to perform repetitive tasks  Assessment details: Pt presents to PT with scapular mobility and strength deficits consistent with L shoulder OA. Therapy will focus on promoting scapular stability and pain free ROM. Pt would benefit from skilled PT intervention to address the deficits noted.   Prognosis: good    Goals  Plan Goals: SHORT TERM GOALS:  6 visits  1. Patient to be compliant with HEP and no diff. with sleeping  2. Increased (B) UE strength to 4+/5 with no pain >5/10 to allow for household and work activities.   3. Pt to report pain at worse as 6/10 in order to demonstrate improved QOL.  4. Pt demonstrates improved posture in sitting and standing with minimal-no cues during treatment session    LONG TERM GOALS: 12 visits  1. Pt score <4% perceived disability on DASH   2. Pt. to exhibit (B) shoulder AROM to WFL (> 160° flex/abd. to allow for reaching overhead and behind back without pain limiting function  3. Pt to exhibit 5/5 UE strength to allow for pushing/pulling and lifting >5 #to occur with pain <2/10  4. Pt able to reach overhead and lift 10# (B) x 10 to allow for return to doing work around home.       Plan  Therapy options: will be seen for skilled therapy services  Planned modality interventions: cryotherapy, electrical stimulation/Russian stimulation, TENS, thermotherapy (hydrocollator packs) and ultrasound  Other planned modality interventions: Dry Needling PRN  Planned therapy interventions: ADL retraining, flexibility, body mechanics training, home exercise program, functional ROM exercises, joint mobilization, manual therapy, neuromuscular re-education, postural training, soft tissue mobilization, spinal/joint mobilization, strengthening, stretching and therapeutic activities  Frequency: 2x week  Duration in weeks: 6  Treatment plan discussed with: patient        Manual Therapy:    -     mins  39906;  Therapeutic Exercise:    10     mins  19577;     Neuromuscular Charleen:    -    mins  68804;    Therapeutic Activity:     15     mins  60856;     Gait Training:      -     mins  85910;     Ultrasound:     -     mins  45926;    Electrical Stimulation:    -     mins  30595 ( );  Dry Needling     -     mins self-pay    Timed Treatment:   25   mins   Total Treatment:     63   mins      PT SIGNATURE: Dominga Swift, PT   [place] license #:  [id number]  DATE TREATMENT INITIATED: 7/29/2022    Hailey Wachter  Student Physical Therapist     I was present in the PT department guiding the student by approving, concurring and confirming the skilled judgement for all services rendered    Initial Certification  Certification Period: 10/27/2022  I certify that the therapy services are furnished while this patient is under my care.  The services outlined above are required by this patient, and will be reviewed every 90 days.     PHYSICIAN: Isamar Collazo APRN      DATE:     Please sign and return via fax to [phone].. Thank you, [facility].

## 2022-08-01 ENCOUNTER — TREATMENT (OUTPATIENT)
Dept: PHYSICAL THERAPY | Facility: CLINIC | Age: 62
End: 2022-08-01

## 2022-08-01 DIAGNOSIS — G89.29 CHRONIC LEFT SHOULDER PAIN: Primary | ICD-10-CM

## 2022-08-01 DIAGNOSIS — M19.012 PRIMARY OSTEOARTHRITIS OF LEFT SHOULDER: ICD-10-CM

## 2022-08-01 DIAGNOSIS — M25.512 CHRONIC LEFT SHOULDER PAIN: Primary | ICD-10-CM

## 2022-08-01 PROCEDURE — 97110 THERAPEUTIC EXERCISES: CPT | Performed by: PHYSICAL THERAPIST

## 2022-08-01 NOTE — PROGRESS NOTES
Physical Therapy Daily Treatment Note      Patient: Kenrick Webster   : 1960  Referring practitioner: BELLO Mann  Date of Initial Visit: Type: THERAPY  Noted: 2022  Today's Date: 2022  Patient seen for 2 sessions       Visit Diagnoses:    ICD-10-CM ICD-9-CM   1. Chronic left shoulder pain  M25.512 719.41    G89.29 338.29   2. Primary osteoarthritis of left shoulder  M19.012 715.11       Subjective Evaluation    History of Present Illness    Subjective comment: little sore for 24 hours after eval.        Objective   See Exercise, Manual, and Modality Logs for complete treatment.   Access Code: [email or web address]  URL: [email or web address]  Date: 2022  Prepared by: Zorre Kimura    Exercises  Supine Piriformis Stretch with Foot on Ground - 3 x daily - 7 x weekly - 2 reps - 30s hold  Supine Hamstring Stretch - 3 x daily - 7 x weekly - 1m hold  Ankle Dorsiflexion with Resistance - 3 x daily - 7 x weekly - 2 sets - 10 reps      Assessment & Plan     Assessment    Assessment details: Did well today with mild strength difference with prone T, and mild strain noted with body blade OH on L. Better after KT tape.    P: cont with rotator cuff strength work. New ex for next rx on flow sheet          Timed:         Manual Therapy:    5     mins  37078;     Therapeutic Exercise:    45     mins  00963;     Neuromuscular Charleen:        mins  63211;    Therapeutic Activity:          mins  28387;     Gait Training:           mins  56411;     Ultrasound:          mins  19912;    Ionto                                   mins   87381  Self Care                            mins   12093  Canalith Repos         mins 89575      Un-Timed:  Electrical Stimulation:         mins  13342 ( );  Dry Needling          mins self-pay  Traction          mins 52259      Timed Treatment:   50   mins   Total Treatment:   55     mins    Zorre Zeno Kimura, PT  [place] License: [id number]    [place] License:  [id number]

## 2022-08-03 ENCOUNTER — TREATMENT (OUTPATIENT)
Dept: PHYSICAL THERAPY | Facility: CLINIC | Age: 62
End: 2022-08-03

## 2022-08-03 DIAGNOSIS — M25.512 CHRONIC LEFT SHOULDER PAIN: Primary | ICD-10-CM

## 2022-08-03 DIAGNOSIS — M19.012 PRIMARY OSTEOARTHRITIS OF LEFT SHOULDER: ICD-10-CM

## 2022-08-03 DIAGNOSIS — G89.29 CHRONIC LEFT SHOULDER PAIN: Primary | ICD-10-CM

## 2022-08-03 PROCEDURE — 97112 NEUROMUSCULAR REEDUCATION: CPT | Performed by: PHYSICAL THERAPIST

## 2022-08-03 PROCEDURE — 97110 THERAPEUTIC EXERCISES: CPT | Performed by: PHYSICAL THERAPIST

## 2022-08-03 NOTE — PROGRESS NOTES
Physical Therapy Daily Treatment Note      Patient: Kenrick Webster   : 1960  Referring practitioner: BELLO Mann  Date of Initial Visit: Type: THERAPY  Noted: 2022  Today's Date: 8/3/2022  Patient seen for 3 sessions       Visit Diagnoses:    ICD-10-CM ICD-9-CM   1. Chronic left shoulder pain  M25.512 719.41    G89.29 338.29   2. Primary osteoarthritis of left shoulder  M19.012 715.11       Kenrick Webster reports: I was a little sore after the evaluation but felt okay after my last visit. My sofa and bed at home aren't tall enough to do some of the exercises lying on my stomach.    Subjective     Objective   See Exercise, Manual, and Modality Logs for complete treatment.       Assessment & Plan     Assessment    Assessment details: Exercises added this visit to promote scapular stability. Pt tolerated session well and noted he could feel his L shoulder working w/ the exercises. 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 Pt will continue to benefit from skilled PT to address deficits and progress towards goals.    Plan  Plan details: Assess how KT tape felt. Provide w/ updated HEP.        Manual Therapy:    -     mins  34953;  Therapeutic Exercise:    30     mins  20122;     Neuromuscular Charleen:    25    mins  15735;    Therapeutic Activity:     -     mins  80357;     Gait Training:      -     mins  85495;     Ultrasound:     -     mins  43005;    Electrical Stimulation:    -     mins  29442 ( );  Dry Needling     -     mins self-pay    Timed Treatment:   55   mins   Total Treatment:     56   mins    Hailey Wachter  Student Physical Therapist     I was present in the PT department guiding the student by approving, concurring and confirming the skilled judgement for all services rendered    Dominga Swift PT  [place] License #: [id number]    Physical Therapist

## 2022-08-09 ENCOUNTER — TREATMENT (OUTPATIENT)
Dept: PHYSICAL THERAPY | Facility: CLINIC | Age: 62
End: 2022-08-09

## 2022-08-09 DIAGNOSIS — M19.012 PRIMARY OSTEOARTHRITIS OF LEFT SHOULDER: ICD-10-CM

## 2022-08-09 DIAGNOSIS — M25.512 CHRONIC LEFT SHOULDER PAIN: Primary | ICD-10-CM

## 2022-08-09 DIAGNOSIS — G89.29 CHRONIC LEFT SHOULDER PAIN: Primary | ICD-10-CM

## 2022-08-09 PROCEDURE — 97110 THERAPEUTIC EXERCISES: CPT | Performed by: PHYSICAL THERAPIST

## 2022-08-09 PROCEDURE — 97530 THERAPEUTIC ACTIVITIES: CPT | Performed by: PHYSICAL THERAPIST

## 2022-08-09 PROCEDURE — 97112 NEUROMUSCULAR REEDUCATION: CPT | Performed by: PHYSICAL THERAPIST

## 2022-08-09 NOTE — PROGRESS NOTES
Physical Therapy Daily Treatment Note      Patient: Kenrick Webster   : 1960  Referring practitioner: BELLO Mann  Date of Initial Visit: Type: THERAPY  Noted: 2022  Today's Date: 2022  Patient seen for 4 sessions       Visit Diagnoses:    ICD-10-CM ICD-9-CM   1. Chronic left shoulder pain  M25.512 719.41    G89.29 338.29   2. Primary osteoarthritis of left shoulder  M19.012 715.11       Kenrick Webster reports:  Doing OK today, no specific c/o at onset of today's visit.  I am most concerned with getting stronger and keeping my arthritis from getting any worse.      Subjective     Objective   See Exercise, Manual, and Modality Logs for complete treatment.   Reapplied KT to (L) shoulder for stability assist.    Assessment & Plan     Assessment    Assessment details: Continued with scapular stabilization and general strengthening exercises today, pt tolerated session well, no significant pain or limitations noted with exercises.   Pt will continue to benefit from skilled PT to address deficits and progress towards goals.    Plan  Plan details: Review/revise HEP as necessary over next 1-2 visits.         Manual Therapy:    -     mins  60524;  Therapeutic Exercise:    30     mins  03196;     Neuromuscular Charleen:   10    mins  65974;    Therapeutic Activity:     10     mins  24688;     Gait Training:      -     mins  45031;     Ultrasound:     -     mins  21275;    Electrical Stimulation:    -     mins  01888 ( );  Dry Needling     -     mins self-pay    Timed Treatment:   50   mins   Total Treatment:     64   mins      CYNDY Meredith License #[id number]  Physical Therapist Assistant

## 2022-08-11 ENCOUNTER — TREATMENT (OUTPATIENT)
Dept: PHYSICAL THERAPY | Facility: CLINIC | Age: 62
End: 2022-08-11

## 2022-08-11 DIAGNOSIS — M19.012 PRIMARY OSTEOARTHRITIS OF LEFT SHOULDER: ICD-10-CM

## 2022-08-11 DIAGNOSIS — G89.29 CHRONIC LEFT SHOULDER PAIN: Primary | ICD-10-CM

## 2022-08-11 DIAGNOSIS — M25.512 CHRONIC LEFT SHOULDER PAIN: Primary | ICD-10-CM

## 2022-08-11 PROCEDURE — 97530 THERAPEUTIC ACTIVITIES: CPT | Performed by: PHYSICAL THERAPIST

## 2022-08-11 PROCEDURE — 97110 THERAPEUTIC EXERCISES: CPT | Performed by: PHYSICAL THERAPIST

## 2022-08-11 PROCEDURE — 97112 NEUROMUSCULAR REEDUCATION: CPT | Performed by: PHYSICAL THERAPIST

## 2022-08-11 NOTE — PROGRESS NOTES
Physical Therapy Daily Treatment Note      Patient: Kenrick Webster   : 1960  Referring practitioner: BELLO Mann  Date of Initial Visit: Type: THERAPY  Noted: 2022  Today's Date: 2022  Patient seen for 5 sessions       Visit Diagnoses:    ICD-10-CM ICD-9-CM   1. Chronic left shoulder pain  M25.512 719.41    G89.29 338.29   2. Primary osteoarthritis of left shoulder  M19.012 715.11       Kenrick Webster reports:  Doing OK today, no specific c/o at onset of today's visit.  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     Subjective     Objective   See Exercise, Manual, and Modality Logs for complete treatment.     Assessment & Plan     Assessment    Assessment details: Continued with scapular stabilization and general strengthening exercises today.  Pt tolerated session well, does note some fatigue during resisted exercise but no significant pain or limitations noted.   Pt will continue to benefit from skilled PT to address deficits and progress towards goals.    Plan  Plan details: Review/revise HEP as necessary over next 1-2 visits.         Manual Therapy:    -     mins  37355;  Therapeutic Exercise:    30     mins  22898;     Neuromuscular Charleen:   10    mins  66206;    Therapeutic Activity:     10     mins  03459;     Gait Training:      -     mins  52356;     Ultrasound:     -     mins  38251;    Electrical Stimulation:    -     mins  98154 ( );  Dry Needling     -     mins self-pay    Timed Treatment:   50   mins   Total Treatment:     59   mins      CYNDY Meredith License #[id number]  Physical Therapist Assistant

## 2022-08-15 ENCOUNTER — TELEPHONE (OUTPATIENT)
Dept: PHYSICAL THERAPY | Facility: CLINIC | Age: 62
End: 2022-08-15

## 2022-08-18 ENCOUNTER — TREATMENT (OUTPATIENT)
Dept: PHYSICAL THERAPY | Facility: CLINIC | Age: 62
End: 2022-08-18

## 2022-08-18 DIAGNOSIS — G89.29 CHRONIC LEFT SHOULDER PAIN: Primary | ICD-10-CM

## 2022-08-18 DIAGNOSIS — M25.512 CHRONIC LEFT SHOULDER PAIN: Primary | ICD-10-CM

## 2022-08-18 DIAGNOSIS — M19.012 PRIMARY OSTEOARTHRITIS OF LEFT SHOULDER: ICD-10-CM

## 2022-08-18 PROCEDURE — 97110 THERAPEUTIC EXERCISES: CPT | Performed by: PHYSICAL THERAPIST

## 2022-08-18 NOTE — PROGRESS NOTES
Physical Therapy Daily Treatment Note      Patient: Kenrick Webster   : 1960  Referring practitioner: BELLO Mann  Date of Initial Visit: Type: THERAPY  Noted: 2022  Today's Date: 2022  Patient seen for 6 sessions       Visit Diagnoses:    ICD-10-CM ICD-9-CM   1. Chronic left shoulder pain  M25.512 719.41    G89.29 338.29   2. Primary osteoarthritis of left shoulder  M19.012 715.11       Kenrick Webster reports: I have been doing pretty well at work. I have not done as much exercise and I am still having trouble sleeping.     Subjective     Objective   See Exercise, Manual, and Modality Logs for complete treatment.       Assessment & Plan     Assessment    Assessment details: Pt is progressing very well with strength and stabilization of left shoulder, but did fatigue quickly today. Discussed importance of trying to get to exercises at least once a day to help continue to reduce pain and improve stability     Plan  Plan details: Progress per POC        Manual Therapy:    -     mins  86029;  Therapeutic Exercise:    42     mins  57997;     Neuromuscular Charleen:    -    mins  38366;    Therapeutic Activity:     -     mins  44325;     Gait Training:      -     mins  16890;     Ultrasound:     -     mins  73423;    Electrical Stimulation:    -     mins  22041 ( );  Dry Needling     -     mins self-pay    Timed Treatment:   42   mins   Total Treatment:     42   mins    AYAAN Ray License #: [id number]    Physical Therapist

## 2022-08-22 ENCOUNTER — TREATMENT (OUTPATIENT)
Dept: PHYSICAL THERAPY | Facility: CLINIC | Age: 62
End: 2022-08-22

## 2022-08-22 DIAGNOSIS — M25.512 CHRONIC LEFT SHOULDER PAIN: Primary | ICD-10-CM

## 2022-08-22 DIAGNOSIS — G89.29 CHRONIC LEFT SHOULDER PAIN: Primary | ICD-10-CM

## 2022-08-22 DIAGNOSIS — M19.012 PRIMARY OSTEOARTHRITIS OF LEFT SHOULDER: ICD-10-CM

## 2022-08-22 PROCEDURE — 97112 NEUROMUSCULAR REEDUCATION: CPT | Performed by: PHYSICAL THERAPIST

## 2022-08-22 PROCEDURE — 97110 THERAPEUTIC EXERCISES: CPT | Performed by: PHYSICAL THERAPIST

## 2022-08-22 NOTE — PROGRESS NOTES
Physical Therapy Daily Treatment Note      Patient: Kenrick Webster   : 1960  Referring practitioner: BELLO Mann  Date of Initial Visit: Type: THERAPY  Noted: 2022  Today's Date: 2022  Patient seen for 7 sessions       Visit Diagnoses:    ICD-10-CM ICD-9-CM   1. Chronic left shoulder pain  M25.512 719.41    G89.29 338.29   2. Primary osteoarthritis of left shoulder  M19.012 715.11       Kenrick Webster reports: The shoulder is doing alright. It still bothers me when I sleep. I have tried a heating pad    Subjective     Objective   See Exercise, Manual, and Modality Logs for complete treatment.       Assessment & Plan     Assessment    Assessment details: Educated on use of ice over heat when his shoulder is sore. Tolerated exercises well. Trial of KT tape to help with sleeping and achiness    Plan  Plan details: Progress per POC        Manual Therapy:    -     mins  97774;  Therapeutic Exercise:    27     mins  96210;     Neuromuscular Charleen:    10    mins  68271;    Therapeutic Activity:     -     mins  46838;     Gait Training:      -     mins  72535;     Ultrasound:     -     mins  32792;    Electrical Stimulation:    -     mins  61847 ( );  Dry Needling     -     mins self-pay    Timed Treatment:   37   mins   Total Treatment:     40   mins    AYAAN Ray License #: [id number]    Physical Therapist

## 2022-08-26 ENCOUNTER — TREATMENT (OUTPATIENT)
Dept: PHYSICAL THERAPY | Facility: CLINIC | Age: 62
End: 2022-08-26

## 2022-08-26 DIAGNOSIS — M19.012 PRIMARY OSTEOARTHRITIS OF LEFT SHOULDER: ICD-10-CM

## 2022-08-26 DIAGNOSIS — M25.512 CHRONIC LEFT SHOULDER PAIN: Primary | ICD-10-CM

## 2022-08-26 DIAGNOSIS — G89.29 CHRONIC LEFT SHOULDER PAIN: Primary | ICD-10-CM

## 2022-08-26 PROCEDURE — 97112 NEUROMUSCULAR REEDUCATION: CPT | Performed by: PHYSICAL THERAPIST

## 2022-08-26 PROCEDURE — 97110 THERAPEUTIC EXERCISES: CPT | Performed by: PHYSICAL THERAPIST

## 2022-08-26 NOTE — PROGRESS NOTES
Physical Therapy Daily Treatment Note      Patient: Kenrick Webster   : 1960  Referring practitioner: BELLO Mann  Date of Initial Visit: Type: THERAPY  Noted: 2022  Today's Date: 2022  Patient seen for 8 sessions       Visit Diagnoses:    ICD-10-CM ICD-9-CM   1. Chronic left shoulder pain  M25.512 719.41    G89.29 338.29   2. Primary osteoarthritis of left shoulder  M19.012 715.11       Kenrick Webster reports: The ice seemed to work.    Subjective     Objective   See Exercise, Manual, and Modality Logs for complete treatment.       Assessment & Plan     Assessment    Assessment details: Pt doing well. Continues to build endurance and strength in his shoulder. Reapplied tape and suggesting using ice as needed    Plan  Plan details: Progress per POC        Manual Therapy:    -     mins  19161;  Therapeutic Exercise:     40    mins  43485;     Neuromuscular Charleen:    10    mins  62908;    Therapeutic Activity:     -     mins  75607;     Gait Training:      -     mins  13777;     Ultrasound:     -     mins  46104;    Electrical Stimulation:    -     mins  84940 ( );  Dry Needling     -     mins self-pay    Timed Treatment:   50   mins   Total Treatment:     60   mins    Dominga Swift PT  [place] License #: [id number]    Physical Therapist

## 2022-10-05 NOTE — PROGRESS NOTES
Physical Therapy Daily Progress Note  Visit: 7    Kenrick Webster reports: My shoulder is still stiff, but it's getting better. I see Dr. Villanueva on the 5th.     Subjective     Objective   See Exercise, Manual, and Modality Logs for complete treatment.       Assessment/Plan   Pt tolerated treatment well. Pts ER and flexion ROM looked better today. Pt was unable to tolerate rows today secondary to pain.   Plan: Measure shoulder and do reassessment at next visit to prepare for MD appointment.    Manual Therapy:    10     mins  92895;  Therapeutic Exercise:    30     mins  48999;     Neuromuscular Charleen:    -    mins  83519;    Therapeutic Activity:     -     mins  07644;     Gait Training:      -     mins  33460;     Ultrasound:     -     mins  67143;    Electrical Stimulation:    15     mins  98085 ( );  Dry Needling     -     mins self-pay    Timed Treatment:   40   mins   Total Treatment:     70   mins    Dominga Swift PT  [place] License #: [id number]    Physical Therapist       Please advise, scheduled for CPE on 10/11/22.

## 2022-10-24 NOTE — PROGRESS NOTES
Subjective   Kenrick Webster is a 61 y.o. male.     History of Present Illness     Chief Complaint:   Chief Complaint   Patient presents with   • Hypertension   • Hyperlipidemia   • left shoulder pain      Follow up per [facility]   / ortho  5/2022/       Kenrick Webster 61 y.o. male who presents today for Medical Management of the below listed issues. He  has a problem list of   Patient Active Problem List   Diagnosis   • DVT (deep venous thrombosis) (HCC)   • Benign essential hypertension   • Renal insufficiency   • Vitamin D deficiency   • Hyperlipidemia   • Gastroesophageal reflux disease without esophagitis   • Moderate persistent asthma without complication   • Other chest pain   • Mass   • Stress at home   .  Since the last visit, He has overall felt well.  he has been compliant with   Current Outpatient Medications:   •  amLODIPine (NORVASC) 5 MG tablet, Take 1 tablet by mouth Daily., Disp: 90 tablet, Rfl: 1  •  atorvastatin (LIPITOR) 10 MG tablet, Take 1 tablet by mouth Daily., Disp: 90 tablet, Rfl: 1  •  Diclofenac Sodium (VOLTAREN) 1 % gel gel, Apply 4 g topically to the appropriate area as directed 4 (Four) Times a Day As Needed (pain)., Disp: 300 g, Rfl: 3  •  losartan (COZAAR) 50 MG tablet, Take 1 tablet by mouth Daily., Disp: 90 tablet, Rfl: 1  •  azelastine (ASTELIN) 0.1 % nasal spray, 2 sprays into the nostril(s) as directed by provider 2 (Two) Times a Day. Use in each nostril as directed, Disp: , Rfl:   •  cholecalciferol (VITAMIN D3) 1000 UNITS tablet, Take 2,000 Units by mouth Daily., Disp: , Rfl:   •  EPINEPHrine (EPIPEN JR IJ), Inject  as directed As Needed., Disp: , Rfl:   •  EPINEPHrine (EPIPEN) 0.3 MG/0.3ML solution auto-injector injection, INJECT 0.3 MILLILITER (0.3 MG) BY INTRAMUSCULAR ROUTE ONCE AS NEEDED FOR ANAPHYLAXIS, Disp: , Rfl:   •  famotidine (PEPCID) 20 MG tablet, Take 20 mg by mouth 2 (Two) Times a Day., Disp: , Rfl:   •  fluticasone (Flonase) 50 MCG/ACT nasal spray, 2  "sprays into the nostril(s) as directed by provider Daily., Disp: 48 g, Rfl: 3  •  meclizine (ANTIVERT) 25 MG tablet, Take 1 tablet by mouth 2 (Two) Times a Day As Needed for Dizziness., Disp: 30 tablet, Rfl: 1  •  montelukast (SINGULAIR) 10 MG tablet, Take 10 mg by mouth Every Night., Disp: , Rfl:   •  predniSONE (DELTASONE) 20 MG tablet, TAKE 3 TABLETS BY MOUTH DAILY FOR 3 DAYS, 2 FOR 2 DAYS, 1 TABLET FOR 2 DAYS, HALF FOR 2 DAYS, Disp: , Rfl: .  He denies medication side effects.    All of the other chronic condition(s) listed above are stable w/o issues.    /83   Pulse 74   Temp 97.4 °F (36.3 °C) (Oral)   Resp 16   Ht 182.9 cm (72\")   Wt 92.5 kg (204 lb)   BMI 27.67 kg/m²     Results for orders placed or performed in visit on 06/01/22   TSH    Specimen: Blood   Result Value Ref Range    TSH 0.735 0.450 - 4.500 uIU/mL   T4, Free    Specimen: Blood   Result Value Ref Range    Free T4 1.29 0.82 - 1.77 ng/dL   Comprehensive Metabolic Panel    Specimen: Blood   Result Value Ref Range    Glucose 93 65 - 99 mg/dL    BUN 9 6 - 24 mg/dL    Creatinine 1.55 (H) 0.76 - 1.27 mg/dL    EGFR Result 51 (L) >59 mL/min/1.73    BUN/Creatinine Ratio 6 (L) 9 - 20    Sodium 143 134 - 144 mmol/L    Potassium 4.7 3.5 - 5.2 mmol/L    Chloride 104 96 - 106 mmol/L    Total CO2 25 20 - 29 mmol/L    Calcium 9.9 8.7 - 10.2 mg/dL    Total Protein 6.5 6.0 - 8.5 g/dL    Albumin 4.2 3.8 - 4.9 g/dL    Globulin 2.3 1.5 - 4.5 g/dL    A/G Ratio 1.8 1.2 - 2.2    Total Bilirubin 1.2 0.0 - 1.2 mg/dL    Alkaline Phosphatase 70 44 - 121 IU/L    AST (SGOT) 16 0 - 40 IU/L    ALT (SGPT) 13 0 - 44 IU/L   CBC & Differential    Specimen: Blood   Result Value Ref Range    WBC 6.3 3.4 - 10.8 x10E3/uL    RBC 4.96 4.14 - 5.80 x10E6/uL    Hemoglobin 14.3 13.0 - 17.7 g/dL    Hematocrit 44.0 37.5 - 51.0 %    MCV 89 79 - 97 fL    MCH 28.8 26.6 - 33.0 pg    MCHC 32.5 31.5 - 35.7 g/dL    RDW 13.4 11.6 - 15.4 %    Platelets 322 150 - 450 x10E3/uL    Neutrophil " Rel % 60 Not Estab. %    Lymphocyte Rel % 28 Not Estab. %    Monocyte Rel % 9 Not Estab. %    Eosinophil Rel % 2 Not Estab. %    Basophil Rel % 1 Not Estab. %    Neutrophils Absolute 3.7 1.4 - 7.0 x10E3/uL    Lymphocytes Absolute 1.8 0.7 - 3.1 x10E3/uL    Monocytes Absolute 0.6 0.1 - 0.9 x10E3/uL    Eosinophils Absolute 0.1 0.0 - 0.4 x10E3/uL    Basophils Absolute 0.1 0.0 - 0.2 x10E3/uL    Immature Granulocyte Rel % 0 Not Estab. %    Immature Grans Absolute 0.0 0.0 - 0.1 x10E3/uL             The following portions of the patient's history were reviewed and updated as appropriate: allergies, current medications, past family history, past medical history, past social history, past surgical history, and problem list.    Review of Systems   Constitutional: Negative for activity change, chills and fever.   Respiratory: Negative for cough.    Cardiovascular: Negative for chest pain.   Psychiatric/Behavioral: Negative for dysphoric mood.       Objective   Physical Exam  Constitutional:       General: He is not in acute distress.     Appearance: He is well-developed.   Cardiovascular:      Rate and Rhythm: Normal rate and regular rhythm.   Pulmonary:      Effort: Pulmonary effort is normal.      Breath sounds: Normal breath sounds.   Neurological:      Mental Status: He is alert and oriented to person, place, and time.   Psychiatric:         Behavior: Behavior normal.         Thought Content: Thought content normal.             Diagnoses and all orders for this visit:    1. Benign essential hypertension (Primary)  -     losartan (COZAAR) 50 MG tablet; Take 1 tablet by mouth Daily.  Dispense: 90 tablet; Refill: 1  -     atorvastatin (LIPITOR) 10 MG tablet; Take 1 tablet by mouth Daily.  Dispense: 90 tablet; Refill: 1  -     amLODIPine (NORVASC) 5 MG tablet; Take 1 tablet by mouth Daily.  Dispense: 90 tablet; Refill: 1    2. Mixed hyperlipidemia  -     atorvastatin (LIPITOR) 10 MG tablet; Take 1 tablet by mouth Daily.   Dispense: 90 tablet; Refill: 1    3. Chronic left shoulder pain  -     Diclofenac Sodium (VOLTAREN) 1 % gel gel; Apply 4 g topically to the appropriate area as directed 4 (Four) Times a Day As Needed (pain).  Dispense: 300 g; Refill: 3

## 2022-10-25 ENCOUNTER — OFFICE VISIT (OUTPATIENT)
Dept: FAMILY MEDICINE CLINIC | Facility: CLINIC | Age: 62
End: 2022-10-25

## 2022-10-25 VITALS
RESPIRATION RATE: 16 BRPM | TEMPERATURE: 97.4 F | SYSTOLIC BLOOD PRESSURE: 115 MMHG | HEART RATE: 74 BPM | WEIGHT: 204 LBS | DIASTOLIC BLOOD PRESSURE: 83 MMHG | BODY MASS INDEX: 27.63 KG/M2 | HEIGHT: 72 IN

## 2022-10-25 DIAGNOSIS — G89.29 CHRONIC LEFT SHOULDER PAIN: ICD-10-CM

## 2022-10-25 DIAGNOSIS — E78.2 MIXED HYPERLIPIDEMIA: Chronic | ICD-10-CM

## 2022-10-25 DIAGNOSIS — I10 BENIGN ESSENTIAL HYPERTENSION: Primary | Chronic | ICD-10-CM

## 2022-10-25 DIAGNOSIS — M25.512 CHRONIC LEFT SHOULDER PAIN: ICD-10-CM

## 2022-10-25 PROCEDURE — 99214 OFFICE O/P EST MOD 30 MIN: CPT | Performed by: FAMILY MEDICINE

## 2022-10-25 RX ORDER — AMLODIPINE BESYLATE 5 MG/1
5 TABLET ORAL DAILY
Qty: 90 TABLET | Refills: 1 | Status: SHIPPED | OUTPATIENT
Start: 2022-10-25

## 2022-10-25 RX ORDER — LOSARTAN POTASSIUM 50 MG/1
50 TABLET ORAL DAILY
Qty: 90 TABLET | Refills: 1 | Status: SHIPPED | OUTPATIENT
Start: 2022-10-25

## 2022-10-25 RX ORDER — ATORVASTATIN CALCIUM 10 MG/1
10 TABLET, FILM COATED ORAL DAILY
Qty: 90 TABLET | Refills: 1 | Status: SHIPPED | OUTPATIENT
Start: 2022-10-25

## 2023-01-24 ENCOUNTER — OFFICE VISIT (OUTPATIENT)
Dept: GASTROENTEROLOGY | Facility: CLINIC | Age: 63
End: 2023-01-24
Payer: COMMERCIAL

## 2023-01-24 VITALS
BODY MASS INDEX: 27.52 KG/M2 | DIASTOLIC BLOOD PRESSURE: 82 MMHG | SYSTOLIC BLOOD PRESSURE: 130 MMHG | HEIGHT: 72 IN | WEIGHT: 203.2 LBS

## 2023-01-24 DIAGNOSIS — Z86.010 PERSONAL HISTORY OF COLONIC POLYPS: ICD-10-CM

## 2023-01-24 DIAGNOSIS — K21.9 GASTROESOPHAGEAL REFLUX DISEASE WITHOUT ESOPHAGITIS: Primary | ICD-10-CM

## 2023-01-24 PROBLEM — Z86.0100 PERSONAL HISTORY OF COLONIC POLYPS: Status: ACTIVE | Noted: 2023-01-24

## 2023-01-24 PROCEDURE — 99214 OFFICE O/P EST MOD 30 MIN: CPT

## 2023-01-24 RX ORDER — OMEPRAZOLE 40 MG/1
40 CAPSULE, DELAYED RELEASE ORAL DAILY
Qty: 90 CAPSULE | Refills: 3 | Status: SHIPPED | OUTPATIENT
Start: 2023-01-24

## 2023-01-24 NOTE — PROGRESS NOTES
PATIENT INFORMATION  Kenrick Webster       - 1960    CHIEF COMPLAINT  Chief Complaint   Patient presents with   • Colon Polyps       HISTORY OF PRESENT ILLNESS    Here today for follow-up per allergist for colon polyp     Has had persistent hoarseness of throat and frequent throat clearing since January. Frequent URI, sinus issues. No HB or reflux, nausea, vomiting, bloating, belching. Sometimes feels tickle in throat randomly. Was on prilosec for many years, but stopped because he wasn't symptomatic.    Last colon 2021 with 1 adenoma, repeat in . Reviewed latest screening recommendations.  EGD and colon 3/31/2014 with chronic gastritis, reflux esophagitis and 1 colonic adenoma        REVIEWED PERTINENT RESULTS/ LABS  Lab Results   Component Value Date    CASEREPORT  2021     Surgical Pathology Report                         Case: [id number]                                  Authorizing Provider:  Jim Almeida        Collected:           2021 02:32 PM                                 MD Betzaida                                                                   Ordering Location:     [facility]  Received:            2021 11:04 AM                                 LABORATORY                                                                   Pathologist:           Gayla Juarez MD                                                          Specimens:   1) - Large Intestine, Cecum, cecal polyp                                                            2) - Large Intestine, Sigmoid Colon, sigmoid polyp                                         FINALDX  2021     1. Colon, Cecum, Biopsy: Colonic mucosa with    A. Tubular adenoma.    2. Colon, Sigmoid, Biopsy: Colonic mucosa with    A. Developing hyperplastic polyp with irritation and inflammation.     [facility]/e        Lab Results   Component Value Date    HGB 14.3 2022    MCV 89 2022      06/01/2022    ALT 13 06/01/2022    AST 16 06/01/2022    HGBA1C 5.7 (H) 02/24/2021    INR 1.00 02/04/2019    TRIG 46 11/03/2021      No results found.    REVIEW OF SYSTEMS  Review of Systems   Constitutional: Negative.    HENT: Negative.    Eyes: Negative.    Respiratory: Negative.    Cardiovascular: Negative.    Gastrointestinal:        Colon Polyps    Endocrine: Negative.    Genitourinary: Negative.    Musculoskeletal: Positive for arthralgias and back pain.   Skin: Negative.    Allergic/Immunologic: Negative.    Neurological: Negative.    Hematological: Negative.    Psychiatric/Behavioral: Negative.          ACTIVE PROBLEMS  Patient Active Problem List    Diagnosis    • Personal history of colonic polyps [Z86.010]    • Stress at home [F43.9]    • Other chest pain [R07.89]    • Mass [UOR3000]    • Moderate persistent asthma without complication [J45.40]    • Gastroesophageal reflux disease without esophagitis [K21.9]    • DVT (deep venous thrombosis) (HCC) [I82.409]    • Benign essential hypertension [I10]    • Renal insufficiency [N28.9]    • Vitamin D deficiency [E55.9]    • Hyperlipidemia [E78.5]          PAST MEDICAL HISTORY  Past Medical History:   Diagnosis Date   • Anxiety and depression    • Back pain    • Benign essential hypertension    • Bursitis    • Chest pain    • Colon polyp    • DVT (deep venous thrombosis) (HCC)    • Elevated cholesterol    • H/O complete eye exam scheduled   • Hyperlipidemia    • Mass     on top of sternum   • Renal insufficiency    • Vitamin D deficiency          SURGICAL HISTORY  Past Surgical History:   Procedure Laterality Date   • APPENDECTOMY     • COLONOSCOPY     • ENDOSCOPY     • OTHER SURGICAL HISTORY      gallbladder testing or surgery   • ROTATOR CUFF REPAIR           FAMILY HISTORY  Family History   Problem Relation Age of Onset   • Hypertension Sister    • Kidney disease Sister    • Diabetes Brother          SOCIAL HISTORY  Social History     Occupational History   •  Not on file   Tobacco Use   • Smoking status: Never   • Smokeless tobacco: Never   Vaping Use   • Vaping Use: Never used   Substance and Sexual Activity   • Alcohol use: No   • Drug use: No   • Sexual activity: Defer         CURRENT MEDICATIONS    Current Outpatient Medications:   •  amLODIPine (NORVASC) 5 MG tablet, Take 1 tablet by mouth Daily., Disp: 90 tablet, Rfl: 1  •  atorvastatin (LIPITOR) 10 MG tablet, Take 1 tablet by mouth Daily., Disp: 90 tablet, Rfl: 1  •  azelastine (ASTELIN) 0.1 % nasal spray, 2 sprays into the nostril(s) as directed by provider 2 (Two) Times a Day. Use in each nostril as directed, Disp: , Rfl:   •  azithromycin (Zithromax Z-Irchard) 250 MG tablet, Take 2 tablets the first day, then 1 tablet daily for 4 days., Disp: 6 tablet, Rfl: 0  •  benzonatate (TESSALON) 100 MG capsule, Take 1 capsule by mouth 3 (Three) Times a Day As Needed for Cough., Disp: 30 capsule, Rfl: 0  •  cholecalciferol (VITAMIN D3) 1000 UNITS tablet, Take 2,000 Units by mouth Daily., Disp: , Rfl:   •  Diclofenac Sodium (VOLTAREN) 1 % gel gel, Apply 4 g topically to the appropriate area as directed 4 (Four) Times a Day As Needed (pain)., Disp: 300 g, Rfl: 3  •  EPINEPHrine (EPIPEN JR IJ), Inject  as directed As Needed., Disp: , Rfl:   •  EPINEPHrine (EPIPEN) 0.3 MG/0.3ML solution auto-injector injection, INJECT 0.3 MILLILITER (0.3 MG) BY INTRAMUSCULAR ROUTE ONCE AS NEEDED FOR ANAPHYLAXIS, Disp: , Rfl:   •  famotidine (PEPCID) 20 MG tablet, Take 20 mg by mouth 2 (Two) Times a Day., Disp: , Rfl:   •  fluticasone (Flonase) 50 MCG/ACT nasal spray, 2 sprays into the nostril(s) as directed by provider Daily., Disp: 48 g, Rfl: 3  •  losartan (COZAAR) 50 MG tablet, Take 1 tablet by mouth Daily., Disp: 90 tablet, Rfl: 1  •  meclizine (ANTIVERT) 25 MG tablet, Take 1 tablet by mouth 2 (Two) Times a Day As Needed for Dizziness., Disp: 30 tablet, Rfl: 1  •  montelukast (SINGULAIR) 10 MG tablet, Take 10 mg by mouth Every Night., Disp: ,  "Rfl:   •  omeprazole (priLOSEC) 40 MG capsule, Take 1 capsule by mouth Daily., Disp: 90 capsule, Rfl: 3    ALLERGIES  Patient has no known allergies.    VITALS  Vitals:    01/24/23 0939   BP: 130/82   BP Location: Left arm   Patient Position: Sitting   Cuff Size: Adult   Weight: 92.2 kg (203 lb 3.2 oz)   Height: 182.9 cm (72.01\")       PHYSICAL EXAM  Debilities/Disabilities Identified: None  Emotional Behavior: Appropriate  Wt Readings from Last 3 Encounters:   01/24/23 92.2 kg (203 lb 3.2 oz)   01/15/23 93 kg (205 lb)   12/06/22 90.7 kg (200 lb)     Ht Readings from Last 1 Encounters:   01/24/23 182.9 cm (72.01\")     Body mass index is 27.55 kg/m².  Physical Exam  Constitutional:       General: He is not in acute distress.     Appearance: Normal appearance. He is not ill-appearing.   HENT:      Head: Normocephalic and atraumatic.      Mouth/Throat:      Mouth: Mucous membranes are moist.      Pharynx: No posterior oropharyngeal erythema.   Eyes:      General: No scleral icterus.  Cardiovascular:      Rate and Rhythm: Normal rate and regular rhythm.      Heart sounds: Normal heart sounds.   Pulmonary:      Effort: Pulmonary effort is normal.      Breath sounds: Normal breath sounds.   Abdominal:      General: Abdomen is flat. Bowel sounds are normal. There is no distension.      Palpations: Abdomen is soft. There is no mass.      Tenderness: There is abdominal tenderness in the epigastric area, periumbilical area and left upper quadrant. There is no guarding or rebound. Negative signs include Mae's sign.      Hernia: No hernia is present.   Musculoskeletal:      Cervical back: Neck supple.   Skin:     General: Skin is warm.      Capillary Refill: Capillary refill takes less than 2 seconds.   Neurological:      General: No focal deficit present.      Mental Status: He is alert and oriented to person, place, and time.   Psychiatric:         Mood and Affect: Mood normal.         Behavior: Behavior normal.         " Thought Content: Thought content normal.         Judgment: Judgment normal.         CLINICAL DATA REVIEWED   reviewed previous lab results and integrated with today's visit, reviewed notes from other physicians and/or last GI encounter, reviewed previous endoscopy results and available photos, reviewed surgical pathology results from previous biopsies    ASSESSMENT  Diagnoses and all orders for this visit:    Gastroesophageal reflux disease without esophagitis  -     omeprazole (priLOSEC) 40 MG capsule; Take 1 capsule by mouth Daily.    Personal history of colonic polyps          PLAN    Resume daily PPI  Colon 2028    Return in about 3 months (around 4/24/2023).    I have discussed the above plan with the patient.  They verbalize understanding and are in agreement with the plan.  They have been advised to contact the office for any questions, concerns, or changes related to their health.

## 2023-01-25 NOTE — PROGRESS NOTES
"Subjective   Kenrick Webster is a 62 y.o. male.     CC: Upper Back Pain    History of Present Illness     Pt comes in today reporting pain in the upper back/shoulder blade region that all started after he had been falling asleep on a wedge pillow while watching TV. Pt reports this about 3 weeks or so.    [place] [facility], tested negative for Covid and Strep, and was given Tessalon, which he chose not to use. Pt doing much better now.    The following portions of the patient's history were reviewed and updated as appropriate: allergies, current medications, past family history, past medical history, past social history, past surgical history and problem list.    Review of Systems   Constitutional: Negative for activity change, chills and fever.   Respiratory: Negative for cough.    Cardiovascular: Negative for chest pain.   Psychiatric/Behavioral: Negative for dysphoric mood.       /81   Pulse 78   Temp 97.3 °F (36.3 °C) (Oral)   Resp 16   Ht 182.9 cm (72.01\")   Wt 92.1 kg (203 lb)   SpO2 99%   BMI 27.52 kg/m²     Objective   Physical Exam  Constitutional:       General: He is not in acute distress.     Appearance: He is well-developed.   Cardiovascular:      Rate and Rhythm: Normal rate and regular rhythm.   Pulmonary:      Effort: Pulmonary effort is normal.      Breath sounds: Normal breath sounds.   Musculoskeletal:        Back:       Comments: Area of tenderness   Neurological:      Mental Status: He is alert and oriented to person, place, and time.   Psychiatric:         Behavior: Behavior normal.         Thought Content: Thought content normal.         Assessment & Plan   Diagnoses and all orders for this visit:    1. Strain of rhomboid muscle, initial encounter (Primary)  -     methylPREDNISolone (Medrol) 4 MG dose pack; follow package directions  Dispense: 21 tablet; Refill: 0  -     baclofen (LIORESAL) 20 MG tablet; Take 1 tablet by mouth Every Night.  Dispense: 30 tablet; " Refill: 2    Heat/stretching discussed.

## 2023-01-26 ENCOUNTER — OFFICE VISIT (OUTPATIENT)
Dept: FAMILY MEDICINE CLINIC | Facility: CLINIC | Age: 63
End: 2023-01-26
Payer: COMMERCIAL

## 2023-01-26 VITALS
SYSTOLIC BLOOD PRESSURE: 118 MMHG | BODY MASS INDEX: 27.5 KG/M2 | RESPIRATION RATE: 16 BRPM | OXYGEN SATURATION: 99 % | DIASTOLIC BLOOD PRESSURE: 81 MMHG | HEIGHT: 72 IN | WEIGHT: 203 LBS | TEMPERATURE: 97.3 F | HEART RATE: 78 BPM

## 2023-01-26 DIAGNOSIS — S29.012A STRAIN OF RHOMBOID MUSCLE, INITIAL ENCOUNTER: Primary | ICD-10-CM

## 2023-01-26 PROCEDURE — 99213 OFFICE O/P EST LOW 20 MIN: CPT | Performed by: FAMILY MEDICINE

## 2023-01-26 RX ORDER — BACLOFEN 20 MG/1
20 TABLET ORAL NIGHTLY
Qty: 30 TABLET | Refills: 2 | Status: SHIPPED | OUTPATIENT
Start: 2023-01-26

## 2023-01-26 RX ORDER — METHYLPREDNISOLONE 4 MG/1
TABLET ORAL
Qty: 21 TABLET | Refills: 0 | Status: SHIPPED | OUTPATIENT
Start: 2023-01-26

## 2023-04-22 DIAGNOSIS — S29.012A STRAIN OF RHOMBOID MUSCLE, INITIAL ENCOUNTER: ICD-10-CM

## 2023-04-24 RX ORDER — BACLOFEN 20 MG/1
TABLET ORAL
Qty: 30 TABLET | Refills: 0 | Status: SHIPPED | OUTPATIENT
Start: 2023-04-24 | End: 2023-04-25 | Stop reason: SDUPTHER

## 2023-04-25 PROBLEM — I26.94 MULTIPLE SUBSEGMENTAL PULMONARY EMBOLI WITHOUT ACUTE COR PULMONALE: Status: ACTIVE | Noted: 2023-04-25

## 2023-04-25 RX ORDER — FLUTICASONE PROPIONATE 50 MCG
SPRAY, SUSPENSION (ML) NASAL
COMMUNITY
Start: 2023-04-23 | End: 2023-04-25 | Stop reason: SDUPTHER

## 2023-04-25 RX ORDER — ALBUTEROL SULFATE 90 UG/1
AEROSOL, METERED RESPIRATORY (INHALATION)
COMMUNITY
Start: 2023-04-21

## 2023-04-25 NOTE — PROGRESS NOTES
"Subjective   Kenrick Webster is a 62 y.o. male.     CC: Hospital F/U for Multiple PEs    History of Present Illness     Patient returns today after being recently admitted to the [facility] for multiple pulmonary emboli.  That visit was as follows:    Basic Information  Additional information: Chief Complaint from Nursing Triage Note : Chief Complaint  4/23/2023 16:47 EDT Chief Complaint SOA x one month . History of Present Illness  This is a 62 year old male with a past medical history of asthma, hypertension, CKD (last Cr 1.3), GERD, and remote DVT. He presents to the ED with the chief complaint of \"something not right with his breathing.\" He states that he has been able to tell for the last month that there is something wrong with his breathing, and he can tell because he needs to yawn twice instead of his normal once. His symptoms do not change with exertion, movement, lying flat, or anything else he can identify. He denies any chest pain, fever, chills, cough, nausea, vomiting, diaphoresis, or edema. He denies any other precipitating, aggravating, or alleviating factors than those listed above.    CTA showed:    IMPRESSION:1. Positive for occlusive and subocclusive pulmonary emboli in the segmental and subsegmental branches of the right middle lobe, left upper lobe and right and left lower lobe. At least 4 small pulmonary emboli are identified. Largest resides in right middle lobe.2. Wedge-shaped presumed atelectasis in the lingula, small pulmonary infarct would not be excluded.Critical value result findings of this study were called to, discussed with, and understood by the patient's provider,    Eliquis 5 mg oral tablet (Prescribe): 10 mg, 2 Tab, Oral, BID, for 7 Day(s), start for the first 7 days, 28 Tab, 0 Refill(s), Prescription Writer  Pharmacy:  Eliquis 5 mg oral tablet (Prescribe): 5 mg, 1 Tab, Oral, BID, start on day 8, 60 Tab, 0 Refill(s).  Patient was given the following " educational materials: Pulmonary Embolism.  Follow up with: MOLLY URBINA Within 2 to 3 days; MARIBEL MCGEE Within 2 to 3 days Follow-up with hematology soon as possible regarding blood clots to your lung.    Current outpatient and discharge medications have been reconciled for the patient.  Reviewed by: Molly Urbina MD    Pt had previous DVT thought to be brought on from his sedentary job and was treated fully and then cleared by hematology.      Today, pt reports improvement in his breathing   Ancillary, patient is doing well with his regular medications, has no side effects, and is due a refill.      The following portions of the patient's history were reviewed and updated as appropriate: allergies, current medications, past family history, past medical history, past social history, past surgical history and problem list.    Review of Systems   Constitutional: Negative for activity change, chills and fever.   Respiratory: Negative for cough.    Cardiovascular: Negative for chest pain.   Psychiatric/Behavioral: Negative for dysphoric mood.       Objective   Physical Exam  Constitutional:       General: He is not in acute distress.     Appearance: He is well-developed.   Cardiovascular:      Rate and Rhythm: Normal rate and regular rhythm.   Pulmonary:      Effort: Pulmonary effort is normal.      Breath sounds: Normal breath sounds.   Neurological:      Mental Status: He is alert and oriented to person, place, and time.   Psychiatric:         Behavior: Behavior normal.         Thought Content: Thought content normal.     Hospital records reviewed with pt confirming HPI.      Assessment & Plan   Diagnoses and all orders for this visit:    1. Multiple subsegmental pulmonary emboli without acute cor pulmonale (Primary)  -     apixaban (Eliquis) 5 MG tablet tablet; Take 1 tablet by mouth Every 12 (Twelve) Hours.  Dispense: 180 tablet; Refill: 1    2. Hospital discharge follow-up    3. Benign essential  hypertension  -     atorvastatin (LIPITOR) 10 MG tablet; Take 1 tablet by mouth Daily.  Dispense: 90 tablet; Refill: 1  -     losartan (COZAAR) 50 MG tablet; Take 1 tablet by mouth Daily.  Dispense: 90 tablet; Refill: 1  -     amLODIPine (NORVASC) 5 MG tablet; Take 1 tablet by mouth Daily.  Dispense: 90 tablet; Refill: 1    4. Mixed hyperlipidemia  -     atorvastatin (LIPITOR) 10 MG tablet; Take 1 tablet by mouth Daily.  Dispense: 90 tablet; Refill: 1    5. Non-seasonal allergic rhinitis due to pollen  -     fluticasone (Flonase) 50 MCG/ACT nasal spray; 2 sprays into the nostril(s) as directed by provider Daily.  Dispense: 48 g; Refill: 3    6. Strain of rhomboid muscle, initial encounter  -     baclofen (LIORESAL) 20 MG tablet; Take 1 tablet by mouth every night at bedtime.  Dispense: 90 tablet; Refill: 1    Pt has an app[facility] with Hematology [facility] 2 weeks and is encouraged to keep that appt.

## 2023-04-26 ENCOUNTER — OFFICE VISIT (OUTPATIENT)
Dept: FAMILY MEDICINE CLINIC | Facility: CLINIC | Age: 63
End: 2023-04-26
Payer: COMMERCIAL

## 2023-04-26 VITALS
OXYGEN SATURATION: 98 % | BODY MASS INDEX: 27.63 KG/M2 | TEMPERATURE: 97.6 F | SYSTOLIC BLOOD PRESSURE: 127 MMHG | RESPIRATION RATE: 16 BRPM | DIASTOLIC BLOOD PRESSURE: 79 MMHG | WEIGHT: 204 LBS | HEIGHT: 72 IN | HEART RATE: 96 BPM

## 2023-04-26 DIAGNOSIS — I26.94 MULTIPLE SUBSEGMENTAL PULMONARY EMBOLI WITHOUT ACUTE COR PULMONALE: Primary | ICD-10-CM

## 2023-04-26 DIAGNOSIS — Z09 HOSPITAL DISCHARGE FOLLOW-UP: ICD-10-CM

## 2023-04-26 DIAGNOSIS — I10 BENIGN ESSENTIAL HYPERTENSION: Chronic | ICD-10-CM

## 2023-04-26 DIAGNOSIS — S29.012A STRAIN OF RHOMBOID MUSCLE, INITIAL ENCOUNTER: ICD-10-CM

## 2023-04-26 DIAGNOSIS — J30.1 NON-SEASONAL ALLERGIC RHINITIS DUE TO POLLEN: ICD-10-CM

## 2023-04-26 DIAGNOSIS — E78.2 MIXED HYPERLIPIDEMIA: Chronic | ICD-10-CM

## 2023-04-26 RX ORDER — LOSARTAN POTASSIUM 50 MG/1
50 TABLET ORAL DAILY
Qty: 90 TABLET | Refills: 1 | Status: SHIPPED | OUTPATIENT
Start: 2023-04-26

## 2023-04-26 RX ORDER — FLUTICASONE PROPIONATE 50 MCG
2 SPRAY, SUSPENSION (ML) NASAL DAILY
Qty: 48 G | Refills: 3 | Status: SHIPPED | OUTPATIENT
Start: 2023-04-26

## 2023-04-26 RX ORDER — AMLODIPINE BESYLATE 5 MG/1
5 TABLET ORAL DAILY
Qty: 90 TABLET | Refills: 1 | Status: SHIPPED | OUTPATIENT
Start: 2023-04-26

## 2023-04-26 RX ORDER — ATORVASTATIN CALCIUM 10 MG/1
10 TABLET, FILM COATED ORAL DAILY
Qty: 90 TABLET | Refills: 1 | Status: SHIPPED | OUTPATIENT
Start: 2023-04-26

## 2023-04-26 RX ORDER — BACLOFEN 20 MG/1
20 TABLET ORAL
Qty: 90 TABLET | Refills: 1 | Status: SHIPPED | OUTPATIENT
Start: 2023-04-26

## 2023-04-26 NOTE — LETTER
April 26, 2023     Patient: Kenrick Webster   YOB: 1960   Date of Visit: 4/26/2023       To Whom It May Concern:    It is my medical opinion that Kenrick Webster may return to work on Thursday at Noon.         Sincerely,        Galileo Urbina MD

## 2023-04-28 ENCOUNTER — TELEPHONE (OUTPATIENT)
Dept: FAMILY MEDICINE CLINIC | Facility: CLINIC | Age: 63
End: 2023-04-28

## 2023-04-28 NOTE — TELEPHONE ENCOUNTER
PATIENT ASKING FOR RETURN TO WORK Monday, HE NEEDS A RELEASE NOTE. HE CAN COME BY TODAY TO  PLEASE CONTACT

## 2023-05-01 ENCOUNTER — TELEPHONE (OUTPATIENT)
Dept: FAMILY MEDICINE CLINIC | Facility: CLINIC | Age: 63
End: 2023-05-01
Payer: COMMERCIAL

## 2023-05-08 ENCOUNTER — APPOINTMENT (OUTPATIENT)
Dept: CT IMAGING | Facility: HOSPITAL | Age: 63
End: 2023-05-08
Payer: COMMERCIAL

## 2023-05-08 ENCOUNTER — APPOINTMENT (OUTPATIENT)
Dept: GENERAL RADIOLOGY | Facility: HOSPITAL | Age: 63
End: 2023-05-08
Payer: COMMERCIAL

## 2023-05-08 ENCOUNTER — HOSPITAL ENCOUNTER (EMERGENCY)
Facility: HOSPITAL | Age: 63
Discharge: HOME | End: 2023-05-08
Attending: EMERGENCY MEDICINE | Admitting: EMERGENCY MEDICINE
Payer: COMMERCIAL

## 2023-05-08 VITALS
OXYGEN SATURATION: 90 % | BODY MASS INDEX: 27.09 KG/M2 | DIASTOLIC BLOOD PRESSURE: 98 MMHG | TEMPERATURE: 97.5 F | HEIGHT: 72 IN | RESPIRATION RATE: 18 BRPM | SYSTOLIC BLOOD PRESSURE: 143 MMHG | HEART RATE: 65 BPM | WEIGHT: 200 LBS

## 2023-05-08 DIAGNOSIS — R53.83 FATIGUE, UNSPECIFIED TYPE: ICD-10-CM

## 2023-05-08 DIAGNOSIS — R06.00 DYSPNEA, UNSPECIFIED TYPE: Primary | ICD-10-CM

## 2023-05-08 LAB
ALBUMIN SERPL-MCNC: 3.9 G/DL (ref 3.5–5.2)
ALBUMIN/GLOB SERPL: 1.4 G/DL
ALP SERPL-CCNC: 69 U/L (ref 39–117)
ALT SERPL W P-5'-P-CCNC: 13 U/L (ref 1–41)
ANION GAP SERPL CALCULATED.3IONS-SCNC: 8.6 MMOL/L (ref 5–15)
AST SERPL-CCNC: 18 U/L (ref 1–40)
BASOPHILS # BLD AUTO: 0.07 10*3/MM3 (ref 0–0.2)
BASOPHILS NFR BLD AUTO: 1.1 % (ref 0–1.5)
BILIRUB SERPL-MCNC: 1 MG/DL (ref 0–1.2)
BUN SERPL-MCNC: 9 MG/DL (ref 8–23)
BUN/CREAT SERPL: 6.2 (ref 7–25)
CALCIUM SPEC-SCNC: 9.3 MG/DL (ref 8.6–10.5)
CHLORIDE SERPL-SCNC: 104 MMOL/L (ref 98–107)
CO2 SERPL-SCNC: 25.4 MMOL/L (ref 22–29)
CREAT SERPL-MCNC: 1.45 MG/DL (ref 0.76–1.27)
DEPRECATED RDW RBC AUTO: 45 FL (ref 37–54)
EGFRCR SERPLBLD CKD-EPI 2021: 55.2 ML/MIN/1.73
EOSINOPHIL # BLD AUTO: 0.2 10*3/MM3 (ref 0–0.4)
EOSINOPHIL NFR BLD AUTO: 3.3 % (ref 0.3–6.2)
ERYTHROCYTE [DISTWIDTH] IN BLOOD BY AUTOMATED COUNT: 14.3 % (ref 12.3–15.4)
GLOBULIN UR ELPH-MCNC: 2.7 GM/DL
GLUCOSE SERPL-MCNC: 94 MG/DL (ref 65–99)
HCT VFR BLD AUTO: 45.5 % (ref 37.5–51)
HGB BLD-MCNC: 14.6 G/DL (ref 13–17.7)
HOLD SPECIMEN: NORMAL
IMM GRANULOCYTES # BLD AUTO: 0.01 10*3/MM3 (ref 0–0.05)
IMM GRANULOCYTES NFR BLD AUTO: 0.2 % (ref 0–0.5)
LYMPHOCYTES # BLD AUTO: 2.29 10*3/MM3 (ref 0.7–3.1)
LYMPHOCYTES NFR BLD AUTO: 37.4 % (ref 19.6–45.3)
MCH RBC QN AUTO: 27.8 PG (ref 26.6–33)
MCHC RBC AUTO-ENTMCNC: 32.1 G/DL (ref 31.5–35.7)
MCV RBC AUTO: 86.5 FL (ref 79–97)
MONOCYTES # BLD AUTO: 0.73 10*3/MM3 (ref 0.1–0.9)
MONOCYTES NFR BLD AUTO: 11.9 % (ref 5–12)
NEUTROPHILS NFR BLD AUTO: 2.83 10*3/MM3 (ref 1.7–7)
NEUTROPHILS NFR BLD AUTO: 46.1 % (ref 42.7–76)
NRBC BLD AUTO-RTO: 0 /100 WBC (ref 0–0.2)
NT-PROBNP SERPL-MCNC: 36.4 PG/ML (ref 0–900)
PLATELET # BLD AUTO: 278 10*3/MM3 (ref 140–450)
PMV BLD AUTO: 9.2 FL (ref 6–12)
POTASSIUM SERPL-SCNC: 3.9 MMOL/L (ref 3.5–5.2)
PROT SERPL-MCNC: 6.6 G/DL (ref 6–8.5)
RBC # BLD AUTO: 5.26 10*6/MM3 (ref 4.14–5.8)
SODIUM SERPL-SCNC: 138 MMOL/L (ref 136–145)
TROPONIN T SERPL HS-MCNC: 10 NG/L
WBC NRBC COR # BLD: 6.13 10*3/MM3 (ref 3.4–10.8)
WHOLE BLOOD HOLD COAG: NORMAL
WHOLE BLOOD HOLD SPECIMEN: NORMAL

## 2023-05-08 PROCEDURE — 83880 ASSAY OF NATRIURETIC PEPTIDE: CPT | Performed by: PHYSICIAN ASSISTANT

## 2023-05-08 PROCEDURE — 84484 ASSAY OF TROPONIN QUANT: CPT | Performed by: PHYSICIAN ASSISTANT

## 2023-05-08 PROCEDURE — 85025 COMPLETE CBC W/AUTO DIFF WBC: CPT | Performed by: PHYSICIAN ASSISTANT

## 2023-05-08 PROCEDURE — 80053 COMPREHEN METABOLIC PANEL: CPT | Performed by: PHYSICIAN ASSISTANT

## 2023-05-08 PROCEDURE — 99283 EMERGENCY DEPT VISIT LOW MDM: CPT

## 2023-05-08 PROCEDURE — 71045 X-RAY EXAM CHEST 1 VIEW: CPT

## 2023-05-08 PROCEDURE — 93005 ELECTROCARDIOGRAM TRACING: CPT | Performed by: PHYSICIAN ASSISTANT

## 2023-05-08 PROCEDURE — 71275 CT ANGIOGRAPHY CHEST: CPT

## 2023-05-08 PROCEDURE — 25510000001 IOPAMIDOL PER 1 ML: Performed by: EMERGENCY MEDICINE

## 2023-05-08 RX ORDER — SODIUM CHLORIDE 0.9 % (FLUSH) 0.9 %
10 SYRINGE (ML) INJECTION AS NEEDED
Status: DISCONTINUED | OUTPATIENT
Start: 2023-05-08 | End: 2023-05-08 | Stop reason: HOSPADM

## 2023-05-08 RX ADMIN — IOPAMIDOL 95 ML: 755 INJECTION, SOLUTION INTRAVENOUS at 19:13

## 2023-05-08 NOTE — ED PROVIDER NOTES
EMERGENCY DEPARTMENT ENCOUNTER    Room Number:  01/01  Date of encounter:  5/8/2023  PCP: Galileo Urbina MD  Historian: Patient  Full history not obtainable due to: None    HPI:  Chief Complaint: SOA    Context: Kenrick Webster is a 62 y.o. male with a PMH significant for DVT, pulmonary emboli anticoagulated on Eliquis, hypertension, hyperlipidemia, GERD, asthma who presents to the ED c/o dyspnea and generalized fatigue.  The patient was diagnosed a couple of weeks ago at an outlying emergency room with pulmonary emboli.  He was started on Eliquis and discharged home and states that since his discharge his symptoms have not improved.  He woke up today and decided to be evaluated when he was noticing difficulty performing activities of daily living.  He denies fever, chills, nausea, vomiting, chest pain.  Symptoms do seem worse with exertion.      MEDICAL RECORD REVIEW:    Upon review of the medical record it appears the patient was evaluated in the office with family medicine on April 26, 2023.  The patient had a normal BNP on 4/23/2023 and a normal troponin on that date as well.    PAST MEDICAL HISTORY    Active Ambulatory Problems     Diagnosis Date Noted   • DVT (deep venous thrombosis)    • Benign essential hypertension    • Renal insufficiency    • Vitamin D deficiency    • Hyperlipidemia    • Gastroesophageal reflux disease without esophagitis 11/25/2015   • Moderate persistent asthma without complication 12/19/2016   • Other chest pain 10/20/2017   • Mass 10/20/2017   • Stress at home 02/27/2018   • Personal history of colonic polyps 01/24/2023   • Multiple subsegmental pulmonary emboli without acute cor pulmonale 04/25/2023     Resolved Ambulatory Problems     Diagnosis Date Noted   • No Resolved Ambulatory Problems     Past Medical History:   Diagnosis Date   • Anxiety and depression    • Back pain    • Bursitis    • Chest pain    • Colon polyp    • Elevated cholesterol    • H/O complete eye exam  scheduled         PAST SURGICAL HISTORY  Past Surgical History:   Procedure Laterality Date   • APPENDECTOMY     • COLONOSCOPY     • ENDOSCOPY     • OTHER SURGICAL HISTORY      gallbladder testing or surgery   • ROTATOR CUFF REPAIR           FAMILY HISTORY  Family History   Problem Relation Age of Onset   • Hypertension Sister    • Kidney disease Sister    • Diabetes Brother          SOCIAL HISTORY  Social History     Socioeconomic History   • Marital status: Single   Tobacco Use   • Smoking status: Never   • Smokeless tobacco: Never   Vaping Use   • Vaping Use: Never used   Substance and Sexual Activity   • Alcohol use: No   • Drug use: No   • Sexual activity: Defer         ALLERGIES  Patient has no known allergies.        REVIEW OF SYSTEMS    All systems reviewed and marked as negative except as listed in HPI     PHYSICAL EXAM    I have reviewed the triage vital signs and nursing notes.    ED Triage Vitals [05/08/23 1644]   Temp Heart Rate Resp BP SpO2   97.5 °F (36.4 °C) 91 18 -- 98 %      Temp src Heart Rate Source Patient Position BP Location FiO2 (%)   Tympanic -- -- -- --       Physical Exam  Constitutional:       General: He is not in acute distress.     Appearance: He is well-developed.   HENT:      Head: Normocephalic and atraumatic.   Eyes:      General: No scleral icterus.     Conjunctiva/sclera: Conjunctivae normal.   Neck:      Trachea: No tracheal deviation.   Cardiovascular:      Rate and Rhythm: Regular rhythm.      Comments: Borderline tachycardia  Pulmonary:      Effort: Pulmonary effort is normal.      Breath sounds: Normal breath sounds.   Abdominal:      Palpations: Abdomen is soft.      Tenderness: There is no abdominal tenderness. There is no guarding.   Musculoskeletal:         General: No deformity.      Cervical back: Normal range of motion.   Lymphadenopathy:      Cervical: No cervical adenopathy.   Skin:     General: Skin is warm and dry.   Neurological:      Mental Status: He is alert  and oriented to person, place, and time.   Psychiatric:         Behavior: Behavior normal.         Vital signs and nursing notes reviewed.            LAB RESULTS  Recent Results (from the past 24 hour(s))   Comprehensive Metabolic Panel    Collection Time: 05/08/23  5:37 PM    Specimen: Blood   Result Value Ref Range    Glucose 94 65 - 99 mg/dL    BUN 9 8 - 23 mg/dL    Creatinine 1.45 (H) 0.76 - 1.27 mg/dL    Sodium 138 136 - 145 mmol/L    Potassium 3.9 3.5 - 5.2 mmol/L    Chloride 104 98 - 107 mmol/L    CO2 25.4 22.0 - 29.0 mmol/L    Calcium 9.3 8.6 - 10.5 mg/dL    Total Protein 6.6 6.0 - 8.5 g/dL    Albumin 3.9 3.5 - 5.2 g/dL    ALT (SGPT) 13 1 - 41 U/L    AST (SGOT) 18 1 - 40 U/L    Alkaline Phosphatase 69 39 - 117 U/L    Total Bilirubin 1.0 0.0 - 1.2 mg/dL    Globulin 2.7 gm/dL    A/G Ratio 1.4 g/dL    BUN/Creatinine Ratio 6.2 (L) 7.0 - 25.0    Anion Gap 8.6 5.0 - 15.0 mmol/L    eGFR 55.2 (L) >60.0 mL/min/1.73   BNP    Collection Time: 05/08/23  5:37 PM    Specimen: Blood   Result Value Ref Range    proBNP 36.4 0.0 - 900.0 pg/mL   Single High Sensitivity Troponin T    Collection Time: 05/08/23  5:37 PM    Specimen: Blood   Result Value Ref Range    HS Troponin T 10 <15 ng/L   Green Top (Gel)    Collection Time: 05/08/23  5:37 PM   Result Value Ref Range    Extra Tube Hold for add-ons.    Lavender Top    Collection Time: 05/08/23  5:37 PM   Result Value Ref Range    Extra Tube hold for add-on    Light Blue Top    Collection Time: 05/08/23  5:37 PM   Result Value Ref Range    Extra Tube Hold for add-ons.    CBC Auto Differential    Collection Time: 05/08/23  5:37 PM    Specimen: Blood   Result Value Ref Range    WBC 6.13 3.40 - 10.80 10*3/mm3    RBC 5.26 4.14 - 5.80 10*6/mm3    Hemoglobin 14.6 13.0 - 17.7 g/dL    Hematocrit 45.5 37.5 - 51.0 %    MCV 86.5 79.0 - 97.0 fL    MCH 27.8 26.6 - 33.0 pg    MCHC 32.1 31.5 - 35.7 g/dL    RDW 14.3 12.3 - 15.4 %    RDW-SD 45.0 37.0 - 54.0 fl    MPV 9.2 6.0 - 12.0 fL     Platelets 278 140 - 450 10*3/mm3    Neutrophil % 46.1 42.7 - 76.0 %    Lymphocyte % 37.4 19.6 - 45.3 %    Monocyte % 11.9 5.0 - 12.0 %    Eosinophil % 3.3 0.3 - 6.2 %    Basophil % 1.1 0.0 - 1.5 %    Immature Grans % 0.2 0.0 - 0.5 %    Neutrophils, Absolute 2.83 1.70 - 7.00 10*3/mm3    Lymphocytes, Absolute 2.29 0.70 - 3.10 10*3/mm3    Monocytes, Absolute 0.73 0.10 - 0.90 10*3/mm3    Eosinophils, Absolute 0.20 0.00 - 0.40 10*3/mm3    Basophils, Absolute 0.07 0.00 - 0.20 10*3/mm3    Immature Grans, Absolute 0.01 0.00 - 0.05 10*3/mm3    nRBC 0.0 0.0 - 0.2 /100 WBC   ECG 12 Lead Dyspnea    Collection Time: 05/08/23  5:47 PM   Result Value Ref Range    QT Interval 380 ms       Ordered the above labs and independently reviewed the results.        RADIOLOGY  XR Chest 1 View    Result Date: 5/8/2023  XR CHEST 1 VW-  HISTORY: 62-year-old male with COPD and shortness of breath.  FINDINGS: Limited apical lordotic projection. There is no evidence for CHF or pneumonia. The left costophrenic angle is blunted and was not blunted on a previous chest radiograph from 2020. There is likely a small left pleural effusion, new since 2020.      CT Angiogram Chest    Result Date: 5/8/2023  CT ANGIOGRAM OF THE CHEST. MULTIPLE CORONAL, SAGITTAL, AND 3-D RECONSTRUCTIONS.  HISTORY: 62-year-old male with shortness of breath. Pulmonary thromboemboli in the past.  TECHNIQUE: Radiation dose reduction techniques were utilized, including automated exposure control and exposure modulation based on body size. CT angiogram of the chest was performed following the administration of IV contrast. Multiple coronal, sagittal, and 3-D reconstruction images were obtained. Compared with chest CTA 10/17/2017.  FINDINGS: There is adequate opacification of the pulmonary arteries and there is no convincing evidence for pulmonary thromboemboli. The caliber of the pulmonary arteries is within normal limits. There is a new linear opacity at the lingula and the  adjacent aspect of the left lower lobe. There are otherwise no new pulmonary opacities and there are no pleural or pericardial effusions. There is no lymphadenopathy within the chest. At the visualized upper abdomen, there is a 2.4 cm cyst at the upper pole of the left kidney, a smaller cyst inferiorly, and a few nonobstructing stones within the left kidney. In addition, there is a 1.2 cm cyst which appears to be arising from the uncinate process of the pancreas series 5 image 181 and a smaller cystic focus is noted at the pancreatic body, image 167.      1. There is no convincing evidence for pulmonary thromboemboli. There is a new linear focus of subsegmental atelectasis or scarring at the lingula and adjacent aspect of the left lower lobe. 2. An outpatient pancreatic protocol CT of the abdomen is recommended for evaluation of 2 tiny cysts which appear to be arising from the pancreas as described.        I ordered the above noted radiological studies. Independently reviewed by me and discussed with radiologist.  See dictation above for official radiology interpretation.      PROCEDURES    Procedures        MEDICATIONS GIVEN IN ER    Medications   sodium chloride 0.9 % flush 10 mL (has no administration in time range)   iopamidol (ISOVUE-370) 76 % injection 100 mL (95 mL Intravenous Given by Other 5/8/23 1913)         PROGRESS, DATA ANALYSIS, CONSULTS, AND MEDICAL DECISION MAKING    All labs have been independently interpreted by me.  All radiology studies have been interpreted by me.  Discussion below represents my analysis of pertinent findings related to patient's condition, differential diagnosis, treatment plan and final disposition.    Patient presentation and work-up consistent with dyspnea from unclear etiology.  He has a reassuring ED work-up including CT angiogram of the chest.  He is not hypoxic and can tolerate ambulation without difficulty.  All questions answered.  Close return precautions given.    -  Chronic or social conditions impacting care: None      DIFFERENTIAL DIAGNOSIS INCLUDE BUT NOT LIMITED TO:     Differential diagnosis includes but is not limited to:  -COVID  -CHF  -acute coronary syndrome  -pulmonary embolism  -pneumothorax  -pneumonia  -asthma/COPD  -deconditioning  -anemia  -anxiety       Orders placed during this visit:  Orders Placed This Encounter   Procedures   • XR Chest 1 View   • CT Angiogram Chest   • [place] Draw   • Comprehensive Metabolic Panel   • BNP   • Single High Sensitivity Troponin T   • CBC Auto Differential   • Cardiac Monitoring   • Continuous Pulse Oximetry   • Vital Signs   • Oxygen Therapy- Nasal Cannula; 2 LPM; Titrate for SPO2: equal to or greater than, 92%   • ECG 12 Lead Dyspnea   • Insert Peripheral IV   • CBC & Differential   • Green Top (Gel)   • Lavender Top   • Gold Top - SST   • Light Blue Top         ED Course as of 05/08/23 2023   Mon May 08, 2023   1806 Per my independent interpretation of the chest x-ray, there is no obvious pneumothorax. [DC]      ED Course User Index  [DC] Jarad Fernandez PA       AS OF 20:23 EDT VITALS:    BP - 143/98  HR - 65  TEMP - 97.5 °F (36.4 °C) (Tympanic)  02 SATS - 92%    2024 I rechecked the patient.  I discussed the patient's labs, radiology findings (including all incidental findings), diagnosis, and plan for discharge.  A repeat exam reveals no new worrisome changes from my initial exam findings.  The patient understands that the fact that they are being discharged does not denote that nothing is abnormal, it indicates that no clinical emergency is present and that they must follow-up as directed in order to properly maintain their health.  Follow-up instructions (specifically listed below) and return to ER precautions were given at this time.  I specifically instructed the patient to follow-up with their PCP.  The patient understands and agrees with the plan, and is ready for discharge.  All questions  answered.      DIAGNOSIS  Final diagnoses:   Dyspnea, unspecified type   Fatigue, unspecified type         DISPOSITION  D/c    Pt masked in first look. I wore a surgical mask throughout my encounters with the pt. I performed hand hygiene on entry into the pt room and upon exit.     Dictated utilizing Dragon dictation     Note Disclaimer: At [facility], we believe that sharing information builds trust and better relationships. You are receiving this note because you recently visited [facility]. It is possible you will see health information before a provider has talked with you about it. This kind of information can be easy to misunderstand. To help you fully understand what it means for your health, we urge you to discuss this note with your provider.      Jarad Fernandez PA  05/08/23 2024

## 2023-05-09 LAB — QT INTERVAL: 380 MS

## 2023-05-09 NOTE — ED PROVIDER NOTES
MD ATTESTATION NOTE    The MALCOM and I have discussed this patient's history, physical exam, and treatment plan.  I have reviewed the documentation and personally had a face to face interaction with the patient. I affirm the documentation and agree with the treatment and plan.  The attached note describes my personal findings.      I provided a substantive portion of the care of the patient.  I personally performed the physical exam in its entirety, and below are my findings.  For this patient encounter, the patient wore surgical mask, I wore full protective PPE including N95 and eye protection.      Brief HPI: Patient presents for evaluation of dyspnea symptoms.  He had been recently diagnosed with PE several weeks ago and has been taking Eliquis as directed.  He denies chest pain.  He denies coughing.  He denies fevers.    PHYSICAL EXAM  ED Triage Vitals   Temp Heart Rate Resp BP SpO2   05/08/23 1644 05/08/23 1644 05/08/23 1644 05/08/23 1704 05/08/23 1644   97.5 °F (36.4 °C) 91 18 137/93 98 %      Temp src Heart Rate Source Patient Position BP Location FiO2 (%)   05/08/23 1644 05/08/23 1703 -- -- --   Tympanic Monitor            GENERAL: Pleasant gentleman, calm, no diaphoresis, no acute distress  HENT: nares patent normocephalic and atraumatic  EYES: no scleral icterus EOMI  CV: regular rhythm, normal rate  RESPIRATORY: normal effort lungs clear to auscultation, no stridor, no coughing  ABDOMEN: soft nontender throughout  MUSCULOSKELETAL: no deformity  NEURO: alert, moves all extremities, follows commands  PSYCH:  calm, cooperative  SKIN: warm, dry    Vital signs and nursing notes reviewed.        Plan: Typical labs and imaging studies were ordered for evaluation of potential cardiac or pulmonary vascular emergency conditions here.  His troponin and BNP values were quite normal and reassuring.  CT angiogram was also quite normal and reassuring.  Patient maintained normal work of breathing and normal saturations on  room air throughout this visit and his physical exam was actually pretty reassuring as well.  I did not feel there was any need for admission or further studies at this time.  He will be discharged home in good condition with instructions to follow-up at PCPs office for continued care and management.     Adrian Woodard MD  05/08/23 2885

## 2023-05-26 ENCOUNTER — TELEPHONE (OUTPATIENT)
Dept: FAMILY MEDICINE CLINIC | Facility: CLINIC | Age: 63
End: 2023-05-26

## 2023-05-26 NOTE — TELEPHONE ENCOUNTER
PATIENT CALLED STATING THAT HE IS NEEDING TO HAVE ORAL SURGERY - EXTRACTION, BUT THE PROVIDER IS NEEDING A REFERRAL AND CLEARANCE FROM DR. JOHNSON BEFORE MOVING FORWARD DUE TO BLOOD THINNERS.    Galileo Clement DMD  [address], [place] [address] [phone]    PLEASE ADVISE  [phone]

## 2023-05-30 NOTE — TELEPHONE ENCOUNTER
"LM with the dentist.     Galileo Urbina MD  You 4 days ago       I had recent PEs and can NOT come off his blood thinners at this time. Dentist will need to find some type of \"work around\".      Okay for the hub to really message    "

## 2023-06-09 ENCOUNTER — TELEPHONE (OUTPATIENT)
Dept: GASTROENTEROLOGY | Facility: CLINIC | Age: 63
End: 2023-06-09
Payer: COMMERCIAL

## 2023-06-09 NOTE — TELEPHONE ENCOUNTER
DR AGUERO OFFICE CALLED TO REQUEST RECORDS [facility]. PLEASE INCLUDE AND LABS/IMAGING ORDERED TOO.    FAX TO   DR AGUERO  [phone]

## 2023-06-10 NOTE — PROGRESS NOTES
"Subjective   Kenrick Webster is a 62 y.o. male.     [place] [facility] [place]    History of Present Illness     Pt returns today after being seen in the [facility] [place] 6/5/23 with this note:    History of Present Illness  62-year-old male presents here today complaining of right leg pain/ache.  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  He mentions that yesterday his right leg started to bother him when he was lying in bed.  He denies any swelling of his leg.  He mentions that he does have some tingling sensation that starts in his lower back into his glutes down to the lateral aspect of his right foot.  He mentions that this feels different than when he had a DVT before although it was his right leg that he experiences them.  He denies any trauma or injury to the leg.     Final diagnoses:   Right leg paresthesias    Medication Changes  Meloxicam 7.5 mg Oral Daily    Today, pt reports prior Lumbar discectomy, and reports increased pain of the lower back/right leg. (Radicular pain to the right leg).       Ancillary, pt's Allergist wants me to look further into some pancreatic cysts seen on prior CT Scan, previously seen by Dr Almeida.     The following portions of the patient's history were reviewed and updated as appropriate: allergies, current medications, past family history, past medical history, past social history, past surgical history and problem list.    Review of Systems   Constitutional:  Negative for activity change, chills and fever.   Respiratory:  Negative for cough.    Cardiovascular:  Negative for chest pain.   Psychiatric/Behavioral:  Negative for dysphoric mood.      /83   Pulse 83   Temp 97.6 °F (36.4 °C) (Oral)   Resp 16   Ht 182.9 cm (72\")   Wt 89.8 kg (198 lb)   BMI 26.85 kg/m²     Objective   Physical Exam  Constitutional:       General: He is not in acute distress.     Appearance: He is well-developed.   Pulmonary:      Effort: " Pulmonary effort is normal.   Neurological:      Mental Status: He is alert and oriented to person, place, and time.   Psychiatric:         Behavior: Behavior normal.         Thought Content: Thought content normal.   Negative SLR.    Assessment & Plan   Diagnoses and all orders for this visit:    1. Sciatica of right side (Primary)  -     methylPREDNISolone (Medrol) 4 MG dose pack; follow package directions  Dispense: 21 tablet; Refill: 0    2. Pancreatic cyst  -     CT abdomen wo contrast; Future    3. Benign essential hypertension  -     Comprehensive metabolic panel  -     Lipid panel  -     CBC and Differential  -     TSH

## 2023-06-12 ENCOUNTER — OFFICE VISIT (OUTPATIENT)
Dept: FAMILY MEDICINE CLINIC | Facility: CLINIC | Age: 63
End: 2023-06-12
Payer: COMMERCIAL

## 2023-06-12 VITALS
WEIGHT: 198 LBS | SYSTOLIC BLOOD PRESSURE: 121 MMHG | HEART RATE: 83 BPM | DIASTOLIC BLOOD PRESSURE: 83 MMHG | TEMPERATURE: 97.6 F | RESPIRATION RATE: 16 BRPM | BODY MASS INDEX: 26.82 KG/M2 | HEIGHT: 72 IN

## 2023-06-12 DIAGNOSIS — M54.31 SCIATICA OF RIGHT SIDE: Primary | ICD-10-CM

## 2023-06-12 DIAGNOSIS — I10 BENIGN ESSENTIAL HYPERTENSION: ICD-10-CM

## 2023-06-12 DIAGNOSIS — K86.2 PANCREATIC CYST: ICD-10-CM

## 2023-06-12 LAB
ALBUMIN SERPL-MCNC: 4.6 G/DL (ref 3.5–5.2)
ALBUMIN/GLOB SERPL: 1.8 G/DL
ALP SERPL-CCNC: 77 U/L (ref 39–117)
ALT SERPL-CCNC: 18 U/L (ref 1–41)
AST SERPL-CCNC: 20 U/L (ref 1–40)
BASOPHILS # BLD AUTO: 0.03 10*3/MM3 (ref 0–0.2)
BASOPHILS NFR BLD AUTO: 0.6 % (ref 0–1.5)
BILIRUB SERPL-MCNC: 1.6 MG/DL (ref 0–1.2)
BUN SERPL-MCNC: 8 MG/DL (ref 8–23)
BUN/CREAT SERPL: 5.2 (ref 7–25)
CALCIUM SERPL-MCNC: 10.2 MG/DL (ref 8.6–10.5)
CHLORIDE SERPL-SCNC: 103 MMOL/L (ref 98–107)
CHOLEST SERPL-MCNC: 167 MG/DL (ref 0–200)
CO2 SERPL-SCNC: 29.9 MMOL/L (ref 22–29)
CREAT SERPL-MCNC: 1.55 MG/DL (ref 0.76–1.27)
EGFRCR SERPLBLD CKD-EPI 2021: 50.9 ML/MIN/1.73
EOSINOPHIL # BLD AUTO: 0.09 10*3/MM3 (ref 0–0.4)
EOSINOPHIL NFR BLD AUTO: 1.8 % (ref 0.3–6.2)
ERYTHROCYTE [DISTWIDTH] IN BLOOD BY AUTOMATED COUNT: 14.2 % (ref 12.3–15.4)
GLOBULIN SER CALC-MCNC: 2.6 GM/DL
GLUCOSE SERPL-MCNC: 102 MG/DL (ref 65–99)
HCT VFR BLD AUTO: 47.2 % (ref 37.5–51)
HDLC SERPL-MCNC: 71 MG/DL (ref 40–60)
HGB BLD-MCNC: 15.8 G/DL (ref 13–17.7)
IMM GRANULOCYTES # BLD AUTO: 0 10*3/MM3 (ref 0–0.05)
IMM GRANULOCYTES NFR BLD AUTO: 0 % (ref 0–0.5)
LDLC SERPL CALC-MCNC: 86 MG/DL (ref 0–100)
LYMPHOCYTES # BLD AUTO: 1.77 10*3/MM3 (ref 0.7–3.1)
LYMPHOCYTES NFR BLD AUTO: 36.2 % (ref 19.6–45.3)
MCH RBC QN AUTO: 28.5 PG (ref 26.6–33)
MCHC RBC AUTO-ENTMCNC: 33.5 G/DL (ref 31.5–35.7)
MCV RBC AUTO: 85 FL (ref 79–97)
MONOCYTES # BLD AUTO: 0.66 10*3/MM3 (ref 0.1–0.9)
MONOCYTES NFR BLD AUTO: 13.5 % (ref 5–12)
NEUTROPHILS # BLD AUTO: 2.34 10*3/MM3 (ref 1.7–7)
NEUTROPHILS NFR BLD AUTO: 47.9 % (ref 42.7–76)
NRBC BLD AUTO-RTO: 0 /100 WBC (ref 0–0.2)
PLATELET # BLD AUTO: 321 10*3/MM3 (ref 140–450)
POTASSIUM SERPL-SCNC: 4.6 MMOL/L (ref 3.5–5.2)
PROT SERPL-MCNC: 7.2 G/DL (ref 6–8.5)
RBC # BLD AUTO: 5.55 10*6/MM3 (ref 4.14–5.8)
SODIUM SERPL-SCNC: 141 MMOL/L (ref 136–145)
TRIGL SERPL-MCNC: 50 MG/DL (ref 0–150)
TSH SERPL DL<=0.005 MIU/L-ACNC: 0.67 UIU/ML (ref 0.27–4.2)
VLDLC SERPL CALC-MCNC: 10 MG/DL (ref 5–40)
WBC # BLD AUTO: 4.89 10*3/MM3 (ref 3.4–10.8)

## 2023-06-12 PROCEDURE — 99214 OFFICE O/P EST MOD 30 MIN: CPT | Performed by: FAMILY MEDICINE

## 2023-06-12 RX ORDER — METHYLPREDNISOLONE 4 MG/1
TABLET ORAL
Qty: 21 TABLET | Refills: 0 | Status: SHIPPED | OUTPATIENT
Start: 2023-06-12 | End: 2023-06-17

## 2023-06-17 ENCOUNTER — APPOINTMENT (OUTPATIENT)
Dept: GENERAL RADIOLOGY | Facility: HOSPITAL | Age: 63
End: 2023-06-17
Payer: COMMERCIAL

## 2023-06-17 ENCOUNTER — HOSPITAL ENCOUNTER (EMERGENCY)
Facility: HOSPITAL | Age: 63
Discharge: HOME | End: 2023-06-17
Attending: EMERGENCY MEDICINE
Payer: COMMERCIAL

## 2023-06-17 VITALS
DIASTOLIC BLOOD PRESSURE: 99 MMHG | TEMPERATURE: 98.2 F | HEART RATE: 112 BPM | WEIGHT: 202 LBS | OXYGEN SATURATION: 97 % | BODY MASS INDEX: 27.36 KG/M2 | SYSTOLIC BLOOD PRESSURE: 150 MMHG | HEIGHT: 72 IN | RESPIRATION RATE: 18 BRPM

## 2023-06-17 DIAGNOSIS — M54.31 SCIATICA OF RIGHT SIDE: Primary | ICD-10-CM

## 2023-06-17 PROCEDURE — 72110 X-RAY EXAM L-2 SPINE 4/>VWS: CPT

## 2023-06-17 RX ORDER — METAXALONE 800 MG/1
800 TABLET ORAL 3 TIMES DAILY
Qty: 15 TABLET | Refills: 0 | Status: SHIPPED | OUTPATIENT
Start: 2023-06-17

## 2023-06-17 RX ORDER — PREDNISONE 20 MG/1
60 TABLET ORAL DAILY
Qty: 9 TABLET | Refills: 0 | Status: SHIPPED | OUTPATIENT
Start: 2023-06-17

## 2023-06-17 RX ORDER — HYDROCODONE BITARTRATE AND ACETAMINOPHEN 10; 325 MG/1; MG/1
0.5 TABLET ORAL EVERY 6 HOURS PRN
Qty: 6 TABLET | Refills: 0 | Status: SHIPPED | OUTPATIENT
Start: 2023-06-17

## 2023-06-18 NOTE — ED TRIAGE NOTES
Pt arrived ambulatory to triage c/o pain in his right leg that radiates to his buttox and lower leg. Pt reports a hx of sciatica. Pt states he was seen at Urgent care last week and his PCP and just finished his steroid back this morning.

## 2023-06-18 NOTE — DISCHARGE INSTRUCTIONS
Take medications as prescribed.  Return to the emergency department for worsening pain, numbness/tingling/weakness in the leg, loss of bowel/bladder control, numbness in your groin/pelvic area, or other concern.

## 2023-06-18 NOTE — ED PROVIDER NOTES
EMERGENCY DEPARTMENT ENCOUNTER    Room Number:  33/33  Date seen:  6/17/2023  PCP: Galileo Urbina MD  Historian: Patient      HPI:  Chief Complaint: Right leg pain  A complete HPI/ROS/PMH/PSH/SH/FH are unobtainable due to: Patient  Context: Kenrick Webster is a 62 y.o. male who presents to the ED by private vehicle from home c/o right leg pain for the past 2 weeks.  Pain starts in the right buttock and radiates down into the right foot.  Pain is described as burning.  He reports tingling in the lateral aspect of his right foot.  He denies any recent injury, back pain, leg weakness, loss of bowel/bladder control, saddle anesthesia, fever, chest pain, dysuria, hematuria, or abdominal pain..  He had a lumbar discectomy in the past.  He was seen at urgent care days ago and started on meloxicam.  He then saw his PCP 5 days ago and was started on a Medrol Dosepak she finished today.  Symptoms have not gotten any better.            PAST MEDICAL HISTORY  Active Ambulatory Problems     Diagnosis Date Noted    DVT (deep venous thrombosis)     Benign essential hypertension     Renal insufficiency     Vitamin D deficiency     Hyperlipidemia     Gastroesophageal reflux disease without esophagitis 11/25/2015    Moderate persistent asthma without complication 12/19/2016    Other chest pain 10/20/2017    Mass 10/20/2017    Stress at home 02/27/2018    Personal history of colonic polyps 01/24/2023    Multiple subsegmental pulmonary emboli without acute cor pulmonale 04/25/2023     Resolved Ambulatory Problems     Diagnosis Date Noted    No Resolved Ambulatory Problems     Past Medical History:   Diagnosis Date    Anxiety and depression     Back pain     Bursitis     Chest pain     Colon polyp     Elevated cholesterol     H/O complete eye exam scheduled         PAST SURGICAL HISTORY  Past Surgical History:   Procedure Laterality Date    APPENDECTOMY      COLONOSCOPY      ENDOSCOPY      OTHER SURGICAL HISTORY      gallbladder  testing or surgery    ROTATOR CUFF REPAIR           FAMILY HISTORY  Family History   Problem Relation Age of Onset    Hypertension Sister     Kidney disease Sister     Diabetes Brother          SOCIAL HISTORY  Social History     Socioeconomic History    Marital status: Single   Tobacco Use    Smoking status: Never    Smokeless tobacco: Never   Vaping Use    Vaping Use: Never used   Substance and Sexual Activity    Alcohol use: No    Drug use: No    Sexual activity: Defer         ALLERGIES  Patient has no known allergies.        REVIEW OF SYSTEMS  Review of Systems     All systems have been reviewed and are negative except as as discussed in the HPI    PHYSICAL EXAM  ED Triage Vitals [06/17/23 2059]   Temp Heart Rate Resp BP SpO2   98.2 °F (36.8 °C) 112 18 -- 97 %      Temp src Heart Rate Source Patient Position BP Location FiO2 (%)   Tympanic Monitor -- -- --       Physical Exam      GENERAL: Awake, alert, oriented x3.  Well-developed, well-nourished male.  Resting comfortably in no acute distress  HENT: NCAT, nares patent  EYES: no scleral icterus  CV: regular rhythm, normal rate  RESPIRATORY: normal effort, clear to auscultation bilaterally  ABDOMEN: soft, nontender with no CVA tenderness  MUSCULOSKELETAL: Extremities are nontender with full range of motion.  T and L-spine and back are nontender  NEURO: Normal and equal strength with bilateral hip flexion/extension, knee flexion/extension, ankle dorsiflexion/plantarflexion, and extension of the great toes.  There is normal light touch sensation both lower extremities.  No saddle anesthesia.  PSYCH:  calm, cooperative  SKIN: warm, dry    Vital signs and nursing notes reviewed.          LAB RESULTS  No results found for this or any previous visit (from the past 24 hour(s)).    Ordered the above labs and reviewed the results.        RADIOLOGY  XR Spine Lumbar Complete 4+VW    Result Date: 6/17/2023  Patient: RACHAEL RENDON  Time Out: 22:39 Exam(s): XR L SPINE 4+  VIEWS EXAM:   XR Lumbosacral Spine, 4 or 5 Views CLINICAL HISTORY:    Reason for exam: Back pain radiating to right leg. TECHNIQUE:   Frontal, lateral and bilateral oblique views of the lumbar spine. COMPARISON:   None FINDINGS:   Bones:  No vertebral body height loss to suggest acute fracture. No subluxation.   Mild degenerative changes of the spine.   Soft tissues: No acute finding.   Other: Cholecystectomy clips the right upper quadrant. IMPRESSION:     No acute fracture or subluxation.     Electronically signed by Pablo Singh M.D. on 06-17-23 at 2239     Ordered the above noted radiological studies. Reviewed by me in PACS.            PROCEDURES  Procedures              MEDICATIONS GIVEN IN ER  Medications - No data to display                MEDICAL DECISION MAKING, PROGRESS, and CONSULTS    All labs have been independently reviewed by me.  All radiology studies have been reviewed by me and I have also reviewed the radiology report.   EKG's independently viewed and interpreted by me.  Discussion below represents my analysis of pertinent findings related to patient's condition, differential diagnosis, treatment plan and final disposition.      Additional sources:  - Discussed/ obtained information from independent historians: N/A    - External (non-ED) record review: Patient was seen in urgent care on 6/5/2023 for right leg pain.  He then saw Dr. Urbina, his PCP, on 6/12/2023 for continued right leg pain.  He was diagnosed with sciatica and given a prescription for a Medrol Dosepak.    - Chronic or social conditions impacting care: N/A          Orders placed during this visit:  Orders Placed This Encounter   Procedures    XR Spine Lumbar Complete 4+VW         Additional orders considered but not ordered:  N/A        Differential diagnosis:    Sciatica, muscle strain/pain, cellulitis, DVT, lumbar radiculopathy      Independent interpretation of labs, radiology studies, and discussions with consultants:  ED  Course as of 06/17/23 2249   Sat Jun 17, 2023   2207 Lumbar spine x-rays personally interpreted by me.  My personal rotation is: Normal alignment.  No compression deformities.  No significant degenerative changes [WH]   2230 Test results discussed with the patient.  He is resting comfortably.  Symptoms are consistent with sciatica.  Neuro exam is normal.  He will be discharged with prescriptions for short course of prednisone, Skelaxin, and Norco.  Return precautions were discussed. [WH]      ED Course User Index  [WH] Jorge A Mendoza MD               DIAGNOSIS  Final diagnoses:   Sciatica of right side  Anticoagulated         DISPOSITION  DISCHARGE    Patient discharged in stable condition.    Reviewed implications of results, diagnosis, meds, responsibility to follow up, warning signs and symptoms of possible worsening, potential complications and reasons to return to ER, including worsening pain, leg weakness, loss of bowel/bladder control, saddle anesthesia, or other concern.    Patient/Family voiced understanding of above instructions.    Discussed plan for discharge, as there is no emergent indication for admission. Patient referred to primary care provider for BP management due to today's BP. Pt/family is agreeable and understands need for follow up and repeat testing.  Pt is aware that discharge does not mean that nothing is wrong but it indicates no emergency is present that requires admission and they must continue care with follow-up as given below or physician of their choice.     FOLLOW-UP  Galileo Urbina MD  [id number] [id number] [phone]    Schedule an appointment as soon as possible for a visit            Medication List        New Prescriptions      HYDROcodone-acetaminophen  MG per tablet  Commonly known as: NORCO  Take 0.5 tablets by mouth Every 6 (Six) Hours As Needed for Moderate Pain.     metaxalone 800 MG tablet  Commonly known as: SKELAXIN  Take 1  tablet by mouth 3 (Three) Times a Day.     predniSONE 20 MG tablet  Commonly known as: DELTASONE  Take 3 tablets by mouth Daily.               Where to Get Your Medications        These medications were sent to [facility]/pharmacy #[id number] - [facility] [place] - [address] AT [facility] - [phone] [facility] [phone]   [address] [facility] [address]      Phone: [phone]   HYDROcodone-acetaminophen  MG per tablet  metaxalone 800 MG tablet  predniSONE 20 MG tablet                   Latest Documented Vital Signs:  As of 22:49 EDT  BP- 150/99 HR- 112 Temp- 98.2 °F (36.8 °C) (Tympanic) O2 sat- 97%      MELLY reviewed by Jorge A Mendoza MD       --    Please note that portions of this were completed with a voice recognition program.       Note Disclaimer: At [facility], we believe that sharing information builds trust and better relationships. You are receiving this note because you are receiving care at [facility] or recently visited. It is possible you will see health information before a provider has talked with you about it. This kind of information can be easy to misunderstand. To help you fully understand what it means for your health, we urge you to discuss this note with your provider.             Jorge A Mendoza MD  06/17/23 [id number]

## 2023-09-02 ENCOUNTER — APPOINTMENT (OUTPATIENT)
Dept: GENERAL RADIOLOGY | Facility: HOSPITAL | Age: 63
End: 2023-09-02
Payer: COMMERCIAL

## 2023-09-02 ENCOUNTER — HOSPITAL ENCOUNTER (EMERGENCY)
Facility: HOSPITAL | Age: 63
Discharge: HOME | End: 2023-09-02
Attending: EMERGENCY MEDICINE
Payer: COMMERCIAL

## 2023-09-02 ENCOUNTER — APPOINTMENT (OUTPATIENT)
Dept: CT IMAGING | Facility: HOSPITAL | Age: 63
End: 2023-09-02
Payer: COMMERCIAL

## 2023-09-02 VITALS
WEIGHT: 205 LBS | SYSTOLIC BLOOD PRESSURE: 120 MMHG | DIASTOLIC BLOOD PRESSURE: 86 MMHG | OXYGEN SATURATION: 99 % | HEART RATE: 57 BPM | BODY MASS INDEX: 27.77 KG/M2 | HEIGHT: 72 IN | RESPIRATION RATE: 18 BRPM | TEMPERATURE: 97 F

## 2023-09-02 DIAGNOSIS — R53.82 CHRONIC FATIGUE: Primary | ICD-10-CM

## 2023-09-02 LAB
ALBUMIN SERPL-MCNC: 3.7 G/DL (ref 3.5–5.2)
ALBUMIN/GLOB SERPL: 1.5 G/DL
ALP SERPL-CCNC: 54 U/L (ref 39–117)
ALT SERPL W P-5'-P-CCNC: 16 U/L (ref 1–41)
ANION GAP SERPL CALCULATED.3IONS-SCNC: 9.5 MMOL/L (ref 5–15)
AST SERPL-CCNC: 23 U/L (ref 1–40)
BASOPHILS # BLD AUTO: 0.04 10*3/MM3 (ref 0–0.2)
BASOPHILS NFR BLD AUTO: 0.8 % (ref 0–1.5)
BILIRUB SERPL-MCNC: 1.3 MG/DL (ref 0–1.2)
BILIRUB UR QL STRIP: NEGATIVE
BUN SERPL-MCNC: 9 MG/DL (ref 8–23)
BUN/CREAT SERPL: 6.1 (ref 7–25)
CALCIUM SPEC-SCNC: 8.6 MG/DL (ref 8.6–10.5)
CHLORIDE SERPL-SCNC: 109 MMOL/L (ref 98–107)
CLARITY UR: CLEAR
CO2 SERPL-SCNC: 23.5 MMOL/L (ref 22–29)
COLOR UR: YELLOW
CREAT SERPL-MCNC: 1.47 MG/DL (ref 0.76–1.27)
DEPRECATED RDW RBC AUTO: 43.3 FL (ref 37–54)
EGFRCR SERPLBLD CKD-EPI 2021: 54.3 ML/MIN/1.73
EOSINOPHIL # BLD AUTO: 0.13 10*3/MM3 (ref 0–0.4)
EOSINOPHIL NFR BLD AUTO: 2.7 % (ref 0.3–6.2)
ERYTHROCYTE [DISTWIDTH] IN BLOOD BY AUTOMATED COUNT: 13.4 % (ref 12.3–15.4)
GLOBULIN UR ELPH-MCNC: 2.4 GM/DL
GLUCOSE SERPL-MCNC: 97 MG/DL (ref 65–99)
GLUCOSE UR STRIP-MCNC: NEGATIVE MG/DL
HCT VFR BLD AUTO: 38.8 % (ref 37.5–51)
HGB BLD-MCNC: 13.2 G/DL (ref 13–17.7)
HGB UR QL STRIP.AUTO: NEGATIVE
IMM GRANULOCYTES # BLD AUTO: 0.01 10*3/MM3 (ref 0–0.05)
IMM GRANULOCYTES NFR BLD AUTO: 0.2 % (ref 0–0.5)
KETONES UR QL STRIP: NEGATIVE
LEUKOCYTE ESTERASE UR QL STRIP.AUTO: NEGATIVE
LYMPHOCYTES # BLD AUTO: 1.46 10*3/MM3 (ref 0.7–3.1)
LYMPHOCYTES NFR BLD AUTO: 30.5 % (ref 19.6–45.3)
MCH RBC QN AUTO: 30.1 PG (ref 26.6–33)
MCHC RBC AUTO-ENTMCNC: 34 G/DL (ref 31.5–35.7)
MCV RBC AUTO: 88.4 FL (ref 79–97)
MONOCYTES # BLD AUTO: 0.65 10*3/MM3 (ref 0.1–0.9)
MONOCYTES NFR BLD AUTO: 13.6 % (ref 5–12)
NEUTROPHILS NFR BLD AUTO: 2.5 10*3/MM3 (ref 1.7–7)
NEUTROPHILS NFR BLD AUTO: 52.2 % (ref 42.7–76)
NITRITE UR QL STRIP: NEGATIVE
NRBC BLD AUTO-RTO: 0 /100 WBC (ref 0–0.2)
PH UR STRIP.AUTO: 7.5 [PH] (ref 5–8)
PLATELET # BLD AUTO: 240 10*3/MM3 (ref 140–450)
PMV BLD AUTO: 9 FL (ref 6–12)
POTASSIUM SERPL-SCNC: 3.8 MMOL/L (ref 3.5–5.2)
PROT SERPL-MCNC: 6.1 G/DL (ref 6–8.5)
PROT UR QL STRIP: NEGATIVE
RBC # BLD AUTO: 4.39 10*6/MM3 (ref 4.14–5.8)
SODIUM SERPL-SCNC: 142 MMOL/L (ref 136–145)
SP GR UR STRIP: 1.01 (ref 1–1.03)
T4 FREE SERPL-MCNC: 1.21 NG/DL (ref 0.93–1.7)
TSH SERPL DL<=0.05 MIU/L-ACNC: 0.55 UIU/ML (ref 0.27–4.2)
UROBILINOGEN UR QL STRIP: NORMAL
WBC NRBC COR # BLD: 4.79 10*3/MM3 (ref 3.4–10.8)

## 2023-09-02 PROCEDURE — 81003 URINALYSIS AUTO W/O SCOPE: CPT | Performed by: PHYSICIAN ASSISTANT

## 2023-09-02 PROCEDURE — 70450 CT HEAD/BRAIN W/O DYE: CPT

## 2023-09-02 PROCEDURE — 93005 ELECTROCARDIOGRAM TRACING: CPT | Performed by: PHYSICIAN ASSISTANT

## 2023-09-02 PROCEDURE — 84439 ASSAY OF FREE THYROXINE: CPT | Performed by: PHYSICIAN ASSISTANT

## 2023-09-02 PROCEDURE — 80050 GENERAL HEALTH PANEL: CPT | Performed by: PHYSICIAN ASSISTANT

## 2023-09-02 PROCEDURE — 99284 EMERGENCY DEPT VISIT MOD MDM: CPT

## 2023-09-02 PROCEDURE — 71045 X-RAY EXAM CHEST 1 VIEW: CPT

## 2023-09-02 NOTE — ED PROVIDER NOTES
MD ATTESTATION NOTE    The MALCOM and I have discussed this patient's history, physical exam, and treatment plan.  I have reviewed the documentation and personally had a face to face interaction with the patient. I affirm the documentation and agree with the treatment and plan.  The attached note describes my personal findings.    I provided a substantive portion of the care of this patient. I personally performed the physical exam, in its entirety.    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  He is also had some intermittent episodes of dizziness that affects him in the morning to the point that he cannot going to work but then it gets better.  No current dizziness but continues to complain of generalized fatigue.    Physical Exam  Vital Signs reviewed  GENERAL: Alert male in no obvious distress.  Triage vitals reviewed and are fairly benign.  Temperature, heart rate and O2 sats are benign  HENT: nares patent  EYES: no scleral icterus  CV: regular rhythm, regular rate-no murmur  RESPIRATORY: normal effort, clear to auscultation bilaterally  ABDOMEN: soft  MUSCULOSKELETAL: no deformity  NEURO: Strength-strength intact.  Sensation and coordination are grossly intact.  Cerebellar testing shows normal finger-nose, rapid altering movements and heel-to-shin.  Speech and mentation are unremarkable  SKIN: warm, dry    EKG independently interpreted by me    Time 5:19 PM    Sinus 65  Normal P waves and NC intervals  Normal axis, normal QRS  Unremarkable ST and T wave    Disposition  I discussed treatment and evaluation this patient with MORALES Matos.  EKG independently interpreted by me as above is unremarkable.  Chest x-ray independently interpreted by me shows no obvious acute disease.  No significant pulmonary infiltrate.  No obvious malignancy.  CT scan of the brain independently interpreted by me shows no obvious tumor hemorrhage or stroke.   Official reading still pending.  Labs reviewed are pretty benign without evidence of infection or anemia.  Thyroid testing unremarkable.  Patient does have elevated creatinine which is near baseline and represents chronic renal failure.    At this point I do not see any real indication for admission but would encourage patient follow-up for further work-up of generalized fatigue and intermittent dizziness.       Jarad Nichols MD  09/02/23 [id number]

## 2023-09-02 NOTE — DISCHARGE INSTRUCTIONS

## 2023-09-02 NOTE — ED PROVIDER NOTES
EMERGENCY DEPARTMENT ENCOUNTER    Room Number:  32/32  Date seen:  9/2/2023  PCP: Galileo Urbina MD    Spoken Language:  English  Language interpretation services not needed     HPI:  Chief Complaint: Fatigue    A complete HPI/ROS/PMH/PSH/SH/FH are unobtainable due to: n/a    Context: Kenrick Webster is a 62 y.o. male presenting to the emergency department complaining of fatigue.  The history is being obtained by the patient and by review of the medical chart.  The patient presents stating that he has chronic fatigue/exhaustion, but that this has significantly increased over the past week.  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  He states that he has been having to drink large quantities of caffeine.  He does endorse having increased stress since school is now back in session because [employer] has had significant problems with their bus system this year.  The patient has an additional complaint of occasional dizziness which is typically present in the morning and improves throughout the day.  He currently denies any dizziness.    PAST MEDICAL HISTORY  Active Ambulatory Problems     Diagnosis Date Noted    DVT (deep venous thrombosis)     Benign essential hypertension     Renal insufficiency     Vitamin D deficiency     Hyperlipidemia     Gastroesophageal reflux disease without esophagitis 11/25/2015    Moderate persistent asthma without complication 12/19/2016    Other chest pain 10/20/2017    Mass 10/20/2017    Stress at home 02/27/2018    Personal history of colonic polyps 01/24/2023    Multiple subsegmental pulmonary emboli without acute cor pulmonale 04/25/2023     Resolved Ambulatory Problems     Diagnosis Date Noted    No Resolved Ambulatory Problems     Past Medical History:   Diagnosis Date    Anxiety and depression     Back pain     Bursitis     Chest pain     Colon polyp     Elevated cholesterol     H/O complete eye exam scheduled       PAST SURGICAL HISTORY  Past Surgical History:   Procedure Laterality Date     APPENDECTOMY      COLONOSCOPY      ENDOSCOPY      OTHER SURGICAL HISTORY      gallbladder testing or surgery    ROTATOR CUFF REPAIR         FAMILY HISTORY  Family History   Problem Relation Age of Onset    Hypertension Sister     Kidney disease Sister     Diabetes Brother        SOCIAL HISTORY  Social History     Socioeconomic History    Marital status: Single   Tobacco Use    Smoking status: Never    Smokeless tobacco: Never   Vaping Use    Vaping Use: Never used   Substance and Sexual Activity    Alcohol use: No    Drug use: No    Sexual activity: Defer       ALLERGIES  Patient has no known allergies.    PHYSICAL EXAM  ED Triage Vitals   Temp Heart Rate Resp BP SpO2   09/02/23 1633 09/02/23 1633 09/02/23 1633 09/02/23 1638 09/02/23 1633   97 °F (36.1 °C) 83 18 120/78 98 %      Temp src Heart Rate Source Patient Position BP Location FiO2 (%)   -- -- -- 09/02/23 1638 --      Right arm        Physical Exam  Vitals and nursing note reviewed.   Constitutional:       Appearance: Normal appearance.   HENT:      Head: Normocephalic and atraumatic.      Mouth/Throat:      Mouth: Mucous membranes are moist.   Eyes:      Extraocular Movements: Extraocular movements intact.      Pupils: Pupils are equal, round, and reactive to light.   Cardiovascular:      Rate and Rhythm: Normal rate and regular rhythm.   Pulmonary:      Effort: Pulmonary effort is normal.      Breath sounds: Normal breath sounds.   Abdominal:      General: There is no distension.      Palpations: Abdomen is soft.      Tenderness: There is no abdominal tenderness.   Musculoskeletal:      Cervical back: Normal range of motion and neck supple.   Neurological:      Mental Status: He is alert and oriented to person, place, and time. Mental status is at baseline.   Psychiatric:         Mood and Affect: Mood normal.         Behavior: Behavior normal.         Thought Content: Thought content normal.         Judgment: Judgment normal.       LAB RESULTS  Recent  Results (from the past 24 hour(s))   Comprehensive Metabolic Panel    Collection Time: 09/02/23  5:17 PM    Specimen: Blood   Result Value Ref Range    Glucose 97 65 - 99 mg/dL    BUN 9 8 - 23 mg/dL    Creatinine 1.47 (H) 0.76 - 1.27 mg/dL    Sodium 142 136 - 145 mmol/L    Potassium 3.8 3.5 - 5.2 mmol/L    Chloride 109 (H) 98 - 107 mmol/L    CO2 23.5 22.0 - 29.0 mmol/L    Calcium 8.6 8.6 - 10.5 mg/dL    Total Protein 6.1 6.0 - 8.5 g/dL    Albumin 3.7 3.5 - 5.2 g/dL    ALT (SGPT) 16 1 - 41 U/L    AST (SGOT) 23 1 - 40 U/L    Alkaline Phosphatase 54 39 - 117 U/L    Total Bilirubin 1.3 (H) 0.0 - 1.2 mg/dL    Globulin 2.4 gm/dL    A/G Ratio 1.5 g/dL    BUN/Creatinine Ratio 6.1 (L) 7.0 - 25.0    Anion Gap 9.5 5.0 - 15.0 mmol/L    eGFR 54.3 (L) >60.0 mL/min/1.73   T4, Free    Collection Time: 09/02/23  5:17 PM    Specimen: Blood   Result Value Ref Range    Free T4 1.21 0.93 - 1.70 ng/dL   TSH    Collection Time: 09/02/23  5:17 PM    Specimen: Blood   Result Value Ref Range    TSH 0.553 0.270 - 4.200 uIU/mL   CBC Auto Differential    Collection Time: 09/02/23  5:17 PM    Specimen: Blood   Result Value Ref Range    WBC 4.79 3.40 - 10.80 10*3/mm3    RBC 4.39 4.14 - 5.80 10*6/mm3    Hemoglobin 13.2 13.0 - 17.7 g/dL    Hematocrit 38.8 37.5 - 51.0 %    MCV 88.4 79.0 - 97.0 fL    MCH 30.1 26.6 - 33.0 pg    MCHC 34.0 31.5 - 35.7 g/dL    RDW 13.4 12.3 - 15.4 %    RDW-SD 43.3 37.0 - 54.0 fl    MPV 9.0 6.0 - 12.0 fL    Platelets 240 140 - 450 10*3/mm3    Neutrophil % 52.2 42.7 - 76.0 %    Lymphocyte % 30.5 19.6 - 45.3 %    Monocyte % 13.6 (H) 5.0 - 12.0 %    Eosinophil % 2.7 0.3 - 6.2 %    Basophil % 0.8 0.0 - 1.5 %    Immature Grans % 0.2 0.0 - 0.5 %    Neutrophils, Absolute 2.50 1.70 - 7.00 10*3/mm3    Lymphocytes, Absolute 1.46 0.70 - 3.10 10*3/mm3    Monocytes, Absolute 0.65 0.10 - 0.90 10*3/mm3    Eosinophils, Absolute 0.13 0.00 - 0.40 10*3/mm3    Basophils, Absolute 0.04 0.00 - 0.20 10*3/mm3    Immature Grans, Absolute 0.01  0.00 - 0.05 10*3/mm3    nRBC 0.0 0.0 - 0.2 /100 WBC   ECG 12 Lead Other; fatigue    Collection Time: 09/02/23  5:19 PM   Result Value Ref Range    QT Interval 365 ms    QTC Interval 380 ms   Urinalysis With Microscopic If Indicated (No Culture) - Urine, Clean Catch    Collection Time: 09/02/23  5:35 PM    Specimen: Urine, Clean Catch   Result Value Ref Range    Color, UA Yellow Yellow, Straw    Appearance, UA Clear Clear    pH, UA 7.5 5.0 - 8.0    Specific Gravity, UA 1.008 1.005 - 1.030    Glucose, UA Negative Negative    Ketones, UA Negative Negative    Bilirubin, UA Negative Negative    Blood, UA Negative Negative    Protein, UA Negative Negative    Leuk Esterase, UA Negative Negative    Nitrite, UA Negative Negative    Urobilinogen, UA 0.2 E.U./dL 0.2 - 1.0 E.U./dL       RADIOLOGY  Imaging Results (Last 24 Hours)       Procedure Component Value Units Date/Time    CT Head Without Contrast [[id number]] Collected: 09/02/23 1900     Updated: 09/02/23 1906    Narrative:      CT OF THE HEAD WITHOUT CONTRAST     HISTORY: Dizziness and double vision     COMPARISON: None available.     TECHNIQUE: Axial CT imaging was obtained through the brain. No IV  contrast was administered.     FINDINGS:  No acute intracranial hemorrhage is seen. Ventricles are normal in size.  There is no midline shift or mass effect. There is some periventricular  and deep white matter microangiopathic change. Minimal mucosal  thickening is noted within the ethmoid sinuses.       Impression:      No acute intracranial findings.     Radiation dose reduction techniques were utilized, including automated  exposure control and exposure modulation based on body size.        This report was finalized on 9/2/2023 7:03 PM by Dr. Eliz Macedo M.D.       XR Chest 1 View [[id number]] Collected: 09/02/23 1854     Updated: 09/02/23 1858    Narrative:      Emergency portable view of the chest on September 2, 2023     CLINICAL HISTORY: Fatigue and dizziness      This is correlated to a prior chest x-ray on May 8, 2023.     FINDINGS: The cardiomediastinal silhouette and the pulmonary vasculature  are within normal limits. The lungs are clear. The costophrenic angles  are sharp.       Impression:      1. No active disease is seen in the chest. The etiology of this  patient's dizziness and fatigue is not established on this exam.     This report was finalized on 9/2/2023 6:55 PM by Dr. Pietro Augustin M.D.               PROCEDURES  Procedures  None    MEDICATIONS GIVEN IN ER  Medications - No data to display    MEDICAL DECISION MAKING, PROGRESS, and CONSULTS  Vital signs and nursing notes have all been reviewed by me.    All laboratory results have been independently interpreted by me.      All radiology studies have been reviewed and independently interpreted by me and discussed with radiologist dictating the report.   I have reviewed the patient's chest x-ray which demonstrates no evidence of pneumothorax, infiltrates, or pulmonary vascular congestion.  His head CT demonstrates no acute intracranial abnormality, such as subdural hematoma or subarachnoid hemorrhage.  There is no hydrocephalus.  There is no mass effect or midline shift.    Orders placed during this visit:  Orders Placed This Encounter   Procedures    XR Chest 1 View    CT Head Without Contrast    Comprehensive Metabolic Panel    Urinalysis With Microscopic If Indicated (No Culture) - Urine, Clean Catch    T4, Free    TSH    CBC Auto Differential    Monitor Blood Pressure    Orthostatic Vitals    Pulse Oximetry, Continuous    ECG 12 Lead Other; fatigue    CBC & Differential       Differential diagnosis includes but is not limited to:  -depression  -cardiac arrhythmia  -hypothyroidism  -LYNDA    Discussion below represents my analysis of pertinent findings related to patient's condition, differential diagnosis, treatment plan and final disposition:    The patient presents for chronic fatigue.  His work-up in the ED  is negative for acute findings.  I have recommended that he follow-up with his primary care provider for further evaluation.  He is also going to discuss the potential of having a sleep study for LYNDA with his PCP.              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     Patient discharged in stable condition.     Reviewed implications of results, diagnosis, meds, responsibility to follow up, warning signs and symptoms of possible worsening, potential complications and reasons to return to ER.     Patient/Family voiced understanding of above instructions.     Discussed plan for discharge, as there is no emergent indication for admission. Patient referred to primary care provider for BP management due to today's BP. Pt/family is agreeable and understands need for follow up and repeat testing.  Pt is aware that discharge does not mean that nothing is wrong but it indicates no emergency is present that requires admission and they must continue care with follow-up as given below or physician of their choice.     DIAGNOSIS  Final diagnoses:   Chronic fatigue       DISPOSITION  ED Disposition       ED Disposition   Discharge    Condition   Stable    Comment   --               FOLLOW UP  Galileo Urbina MD  [id number] [address]  [phone]    Schedule an appointment as soon as possible for a visit         RX  Medications - No data to display       Medication List      No changes were made to your prescriptions during this visit.         Latest Documented Vital Signs:  As of 19:55 EDT  BP- 119/84 HR- 59 Temp- 97 °F (36.1 °C) O2 sat- 100%    Provider Attestation:  I personally reviewed the past medical history, past surgical history, social history, family history, current medications and allergies as they appear in the chart. I  reviewed the patient's history, physical, lab/imaging results and overall care with Dr. Nichols who is in agreement with the patient's treatment plan.    EMR Dragon/Transcription disclaimer:  Dictated using Dragon dictation    Provider note signed by:    Note Disclaimer: At [facility], we believe that sharing information builds trust and better relationships. You are receiving this note because you are receiving care at [facility] or recently visited. It is possible you will see health information before a provider has talked with you about it. This kind of information can be easy to misunderstand. To help you fully understand what it means for your health, we urge you to discuss this note with your provider.       Elizabeth Matos PA  09/02/23 1956

## 2023-09-02 NOTE — ED TRIAGE NOTES
"Pt reports dizziness x 2 months and increased fatigue. Pt reports today there are no new changes \"but it is time to come in.\"  Denies weakness to one side, speech clear, gait steady.   "

## 2023-09-03 LAB
QT INTERVAL: 365 MS
QTC INTERVAL: 380 MS

## 2023-09-13 ENCOUNTER — OFFICE VISIT (OUTPATIENT)
Dept: FAMILY MEDICINE CLINIC | Facility: CLINIC | Age: 63
End: 2023-09-13
Payer: COMMERCIAL

## 2023-09-13 VITALS
DIASTOLIC BLOOD PRESSURE: 70 MMHG | TEMPERATURE: 98.1 F | RESPIRATION RATE: 16 BRPM | HEIGHT: 72 IN | WEIGHT: 205 LBS | BODY MASS INDEX: 27.77 KG/M2 | SYSTOLIC BLOOD PRESSURE: 109 MMHG | HEART RATE: 73 BPM | OXYGEN SATURATION: 98 %

## 2023-09-13 DIAGNOSIS — I26.94 MULTIPLE SUBSEGMENTAL PULMONARY EMBOLI WITHOUT ACUTE COR PULMONALE: Chronic | ICD-10-CM

## 2023-09-13 DIAGNOSIS — R53.83 OTHER FATIGUE: ICD-10-CM

## 2023-09-13 DIAGNOSIS — E78.2 MIXED HYPERLIPIDEMIA: Chronic | ICD-10-CM

## 2023-09-13 DIAGNOSIS — R06.09 DOE (DYSPNEA ON EXERTION): ICD-10-CM

## 2023-09-13 DIAGNOSIS — I10 BENIGN ESSENTIAL HYPERTENSION: Primary | Chronic | ICD-10-CM

## 2023-09-13 PROCEDURE — 99214 OFFICE O/P EST MOD 30 MIN: CPT | Performed by: FAMILY MEDICINE

## 2023-09-13 RX ORDER — ATORVASTATIN CALCIUM 10 MG/1
10 TABLET, FILM COATED ORAL DAILY
Qty: 90 TABLET | Refills: 1 | Status: SHIPPED | OUTPATIENT
Start: 2023-09-13

## 2023-09-13 RX ORDER — LOSARTAN POTASSIUM 50 MG/1
50 TABLET ORAL DAILY
Qty: 90 TABLET | Refills: 1 | Status: SHIPPED | OUTPATIENT
Start: 2023-09-13

## 2023-09-13 RX ORDER — AMLODIPINE BESYLATE 5 MG/1
5 TABLET ORAL DAILY
Qty: 90 TABLET | Refills: 1 | Status: SHIPPED | OUTPATIENT
Start: 2023-09-13

## 2023-09-13 NOTE — PROGRESS NOTES
Chief Complaint:   Chief Complaint   Patient presents with    DIZZINESS / LIGHTHEADNESS TO DISCUSS    Hypertension    Hyperlipidemia    DVT     Fatigue       Kenrick Webster 62 y.o. male who presents today for Medical Management of the below listed issues. He  has a problem list of   Patient Active Problem List   Diagnosis    DVT (deep venous thrombosis)    Benign essential hypertension    Renal insufficiency    Vitamin D deficiency    Hyperlipidemia    Gastroesophageal reflux disease without esophagitis    Moderate persistent asthma without complication    Other chest pain    Mass    Stress at home    Personal history of colonic polyps    Multiple subsegmental pulmonary emboli without acute cor pulmonale   .  Since the last visit, He has been to the ED last week for chronic fatigue (w/u negative), but pt has had progressive fatigue and some DUNCAN over the past few months. No CP reported.   he has been compliant with   Current Outpatient Medications:     amLODIPine (NORVASC) 5 MG tablet, Take 1 tablet by mouth Daily., Disp: 90 tablet, Rfl: 1    apixaban (Eliquis) 5 MG tablet tablet, Take 1 tablet by mouth Every 12 (Twelve) Hours., Disp: 180 tablet, Rfl: 1    atorvastatin (LIPITOR) 10 MG tablet, Take 1 tablet by mouth Daily., Disp: 90 tablet, Rfl: 1    losartan (COZAAR) 50 MG tablet, Take 1 tablet by mouth Daily., Disp: 90 tablet, Rfl: 1    cetirizine (zyrTEC) 10 MG tablet, Take 1 tablet by mouth Daily for 30 days., Disp: 30 tablet, Rfl: 0    cholecalciferol (VITAMIN D3) 1000 UNITS tablet, Take 2 tablets by mouth Daily., Disp: , Rfl:     Cholecalciferol 50 MCG (2000 UT) tablet, Take 1 tablet by mouth Daily., Disp: , Rfl:     Diclofenac Sodium (VOLTAREN) 1 % gel gel, Apply 4 g topically to the appropriate area as directed 4 (Four) Times a Day As Needed (pain)., Disp: 300 g, Rfl: 0    EPINEPHrine (EPIPEN JR ), Inject  as directed As Needed., Disp: , Rfl:     EPINEPHrine (EPIPEN) 0.3 MG/0.3ML solution auto-injector  "injection, INJECT 0.3 MILLILITER (0.3 MG) BY INTRAMUSCULAR ROUTE ONCE AS NEEDED FOR ANAPHYLAXIS, Disp: , Rfl:     montelukast (SINGULAIR) 10 MG tablet, Take 1 tablet by mouth Every Night., Disp: , Rfl: .  He denies medication side effects.    All of the other chronic condition(s) listed above are stable w/o issues.    /70   Pulse 73   Temp 98.1 °F (36.7 °C) (Oral)   Resp 16   Ht 182.9 cm (72\")   Wt 93 kg (205 lb)   SpO2 98%   BMI 27.80 kg/m²     Results for orders placed or performed during the hospital encounter of 09/02/23   Comprehensive Metabolic Panel    Specimen: Blood   Result Value Ref Range    Glucose 97 65 - 99 mg/dL    BUN 9 8 - 23 mg/dL    Creatinine 1.47 (H) 0.76 - 1.27 mg/dL    Sodium 142 136 - 145 mmol/L    Potassium 3.8 3.5 - 5.2 mmol/L    Chloride 109 (H) 98 - 107 mmol/L    CO2 23.5 22.0 - 29.0 mmol/L    Calcium 8.6 8.6 - 10.5 mg/dL    Total Protein 6.1 6.0 - 8.5 g/dL    Albumin 3.7 3.5 - 5.2 g/dL    ALT (SGPT) 16 1 - 41 U/L    AST (SGOT) 23 1 - 40 U/L    Alkaline Phosphatase 54 39 - 117 U/L    Total Bilirubin 1.3 (H) 0.0 - 1.2 mg/dL    Globulin 2.4 gm/dL    A/G Ratio 1.5 g/dL    BUN/Creatinine Ratio 6.1 (L) 7.0 - 25.0    Anion Gap 9.5 5.0 - 15.0 mmol/L    eGFR 54.3 (L) >60.0 mL/min/1.73   Urinalysis With Microscopic If Indicated (No Culture) - Urine, Clean Catch    Specimen: Urine, Clean Catch   Result Value Ref Range    Color, UA Yellow Yellow, Straw    Appearance, UA Clear Clear    pH, UA 7.5 5.0 - 8.0    Specific Gravity, UA 1.008 1.005 - 1.030    Glucose, UA Negative Negative    Ketones, UA Negative Negative    Bilirubin, UA Negative Negative    Blood, UA Negative Negative    Protein, UA Negative Negative    Leuk Esterase, UA Negative Negative    Nitrite, UA Negative Negative    Urobilinogen, UA 0.2 E.U./dL 0.2 - 1.0 E.U./dL   T4, Free    Specimen: Blood   Result Value Ref Range    Free T4 1.21 0.93 - 1.70 ng/dL   TSH    Specimen: Blood   Result Value Ref Range    TSH 0.553 0.270 - " 4.200 uIU/mL   CBC Auto Differential    Specimen: Blood   Result Value Ref Range    WBC 4.79 3.40 - 10.80 10*3/mm3    RBC 4.39 4.14 - 5.80 10*6/mm3    Hemoglobin 13.2 13.0 - 17.7 g/dL    Hematocrit 38.8 37.5 - 51.0 %    MCV 88.4 79.0 - 97.0 fL    MCH 30.1 26.6 - 33.0 pg    MCHC 34.0 31.5 - 35.7 g/dL    RDW 13.4 12.3 - 15.4 %    RDW-SD 43.3 37.0 - 54.0 fl    MPV 9.0 6.0 - 12.0 fL    Platelets 240 140 - 450 10*3/mm3    Neutrophil % 52.2 42.7 - 76.0 %    Lymphocyte % 30.5 19.6 - 45.3 %    Monocyte % 13.6 (H) 5.0 - 12.0 %    Eosinophil % 2.7 0.3 - 6.2 %    Basophil % 0.8 0.0 - 1.5 %    Immature Grans % 0.2 0.0 - 0.5 %    Neutrophils, Absolute 2.50 1.70 - 7.00 10*3/mm3    Lymphocytes, Absolute 1.46 0.70 - 3.10 10*3/mm3    Monocytes, Absolute 0.65 0.10 - 0.90 10*3/mm3    Eosinophils, Absolute 0.13 0.00 - 0.40 10*3/mm3    Basophils, Absolute 0.04 0.00 - 0.20 10*3/mm3    Immature Grans, Absolute 0.01 0.00 - 0.05 10*3/mm3    nRBC 0.0 0.0 - 0.2 /100 WBC   ECG 12 Lead Other; fatigue   Result Value Ref Range    QT Interval 365 ms    QTC Interval 380 ms             The following portions of the patient's history were reviewed and updated as appropriate: allergies, current medications, past family history, past medical history, past social history, past surgical history, and problem list.    Review of Systems   Constitutional:  Positive for fatigue. Negative for activity change, chills and fever.   Respiratory:  Positive for shortness of breath (with exertion). Negative for cough.    Cardiovascular:  Negative for chest pain.   Psychiatric/Behavioral:  Negative for dysphoric mood.      Objective            Physical Exam  Constitutional:       General: He is not in acute distress.     Appearance: He is well-developed.   Cardiovascular:      Rate and Rhythm: Normal rate and regular rhythm.   Pulmonary:      Effort: Pulmonary effort is normal.      Breath sounds: Normal breath sounds.   Neurological:      Mental Status: He is alert  and oriented to person, place, and time.   Psychiatric:         Behavior: Behavior normal.         Thought Content: Thought content normal.   Labs reviewed with pt today during visit. All questions answered.  Hospital records reviewed with pt confirming HPI.          Diagnoses and all orders for this visit:    1. Benign essential hypertension (Primary)  -     amLODIPine (NORVASC) 5 MG tablet; Take 1 tablet by mouth Daily.  Dispense: 90 tablet; Refill: 1  -     atorvastatin (LIPITOR) 10 MG tablet; Take 1 tablet by mouth Daily.  Dispense: 90 tablet; Refill: 1  -     losartan (COZAAR) 50 MG tablet; Take 1 tablet by mouth Daily.  Dispense: 90 tablet; Refill: 1    2. Multiple subsegmental pulmonary emboli without acute cor pulmonale  -     apixaban (Eliquis) 5 MG tablet tablet; Take 1 tablet by mouth Every 12 (Twelve) Hours.  Dispense: 180 tablet; Refill: 1    3. Mixed hyperlipidemia  -     atorvastatin (LIPITOR) 10 MG tablet; Take 1 tablet by mouth Daily.  Dispense: 90 tablet; Refill: 1    4. DUNCAN (dyspnea on exertion)  -     Ambulatory Referral to Cardiology    5. Other fatigue  -     Ambulatory Referral to Cardiology

## 2023-11-02 ENCOUNTER — OFFICE VISIT (OUTPATIENT)
Dept: CARDIOLOGY | Facility: CLINIC | Age: 63
End: 2023-11-02
Payer: COMMERCIAL

## 2023-11-02 VITALS
DIASTOLIC BLOOD PRESSURE: 80 MMHG | WEIGHT: 190 LBS | BODY MASS INDEX: 25.73 KG/M2 | OXYGEN SATURATION: 98 % | HEART RATE: 76 BPM | HEIGHT: 72 IN | SYSTOLIC BLOOD PRESSURE: 120 MMHG

## 2023-11-02 DIAGNOSIS — I10 BENIGN ESSENTIAL HYPERTENSION: ICD-10-CM

## 2023-11-02 DIAGNOSIS — R06.02 SHORT OF BREATH ON EXERTION: Primary | ICD-10-CM

## 2023-11-02 PROCEDURE — 99204 OFFICE O/P NEW MOD 45 MIN: CPT | Performed by: INTERNAL MEDICINE

## 2023-11-02 RX ORDER — BENZONATATE 100 MG/1
100 CAPSULE ORAL 3 TIMES DAILY PRN
COMMUNITY

## 2023-11-02 NOTE — PROGRESS NOTES
"      CARDIOLOGY    Timothy Marrero MD    ENCOUNTER DATE:  11/02/2023    Kenrick Webster / 62 y.o. / male        CHIEF COMPLAINT / REASON FOR OFFICE VISIT     Shortness of Breath and Fatigue      HISTORY OF PRESENT ILLNESS       Shortness of Breath    Fatigue  Associated symptoms include fatigue.     Kenrick Webster is a 62 y.o. male who presents today for consultation.  Patient has been describing some fatigue and shortness of breath.  Patient said a year ago he was feeling just fine.  His symptoms started back in the spring of this year in which she ultimately was diagnosed with pulmonary embolism.  He said he felt really bad with that and very short of breath.  His symptoms have improved about 50% back to what he was a year ago with just as close as he is gotten.  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  He complains continuing shortness of breath and marked fatigue.      The following portions of the patient's history were reviewed and updated as appropriate: allergies, current medications, past family history, past medical history, past social history, past surgical history and problem list.      VITAL SIGNS     Visit Vitals  /80 (BP Location: Right arm)   Pulse 76   Ht 182.9 cm (72\")   Wt 86.2 kg (190 lb)   SpO2 98%   BMI 25.77 kg/m²         Wt Readings from Last 3 Encounters:   11/02/23 86.2 kg (190 lb)   09/12/23 93 kg (205 lb)   09/02/23 93 kg (205 lb)     Body mass index is 25.77 kg/m².      REVIEW OF SYSTEMS   Review of Systems   Constitutional: Positive for fatigue.   Respiratory:  Positive for shortness of breath.            PHYSICAL EXAMINATION     Vitals reviewed.   Constitutional:       Appearance: Healthy appearance.   Pulmonary:      Effort: Pulmonary effort is normal.      Breath sounds: Examination of the right-middle field reveals wheezing. Examination of the left-middle field reveals wheezing. Wheezing present.   Cardiovascular:      " Normal rate. Regular rhythm. Normal S1. Normal S2.       Murmurs: There is no murmur.      No gallop.  No click. No rub.   Pulses:     Intact distal pulses.   Edema:     Peripheral edema absent.   Neurological:      Mental Status: Alert and oriented to person, place and time.           REVIEWED DATA     Procedures    Cardiac Procedures:      Lipid Panel          6/12/2023    11:11   Lipid Panel   Total Cholesterol 167    Triglycerides 50    HDL Cholesterol 71    VLDL Cholesterol 10    LDL Cholesterol  86          ASSESSMENT & PLAN      Diagnosis Plan   1. Short of breath on exertion  Adult Stress Echo W/ Cont or Stress Agent if Necessary Per Protocol    Complete PFT - Pre & Post Bronchodilator    Hemoglobin      2. Benign essential hypertension              SUMMARY/DISCUSSION  Hypertension blood pressures good.  Patient with shortness of breath out of proportion to what it should be.  He was actually wheezing today on physical exam which she has never been told that before.  I would have set him up for a stress echocardiogram to reassess his cardiovascular status I am also going to set him up for pulmonary function test to see if there is a component that could be causing some of his shortness of breath.  Pending results of the studies further recommendations will be made.  With his wheezing I did review his chest x-ray which was done on 9/2/2023.  He had a repeat CT scan on 5/8/2023 that showed no convincing evidence of pulmonary thromboemboli.  If his tests are unremarkable I think he is got start exercising to get himself back to baseline.        MEDICATIONS         Discharge Medications            Accurate as of November 2, 2023  1:33 PM. If you have any questions, ask your nurse or doctor.                Continue These Medications        Instructions Start Date   amLODIPine 5 MG tablet  Commonly known as: NORVASC   5 mg, Oral, Daily      apixaban 5 MG tablet tablet  Commonly known as: Eliquis   5 mg, Oral,  Every 12 Hours Scheduled      atorvastatin 10 MG tablet  Commonly known as: LIPITOR   10 mg, Oral, Daily      benzonatate 100 MG capsule  Commonly known as: TESSALON   100 mg, Oral, 3 Times Daily PRN      cetirizine 10 MG tablet  Commonly known as: zyrTEC   10 mg, Oral, Daily      cholecalciferol 25 MCG (1000 UT) tablet  Commonly known as: VITAMIN D3   2,000 Units, Oral, Daily      Cholecalciferol 50 MCG (2000 UT) tablet   1 tablet, Oral, Daily      Diclofenac Sodium 1 % gel gel  Commonly known as: VOLTAREN   4 g, Topical, 4 Times Daily PRN      EPIPEN JR IJ   Injection, As Needed      EPINEPHrine 0.3 MG/0.3ML solution auto-injector injection  Commonly known as: EPIPEN   INJECT 0.3 MILLILITER (0.3 MG) BY INTRAMUSCULAR ROUTE ONCE AS NEEDED FOR ANAPHYLAXIS      losartan 50 MG tablet  Commonly known as: COZAAR   50 mg, Oral, Daily      montelukast 10 MG tablet  Commonly known as: SINGULAIR   10 mg, Oral, Nightly                   **Dragon Disclaimer:   Much of this encounter note is an electronic transcription/translation of spoken language to printed text. The electronic translation of spoken language may permit erroneous, or at times, nonsensical words or phrases to be inadvertently transcribed. Although I have reviewed the note for such errors, some may still exist.

## 2023-12-14 ENCOUNTER — APPOINTMENT (OUTPATIENT)
Dept: GENERAL RADIOLOGY | Facility: HOSPITAL | Age: 63
End: 2023-12-14
Payer: COMMERCIAL

## 2023-12-14 ENCOUNTER — HOSPITAL ENCOUNTER (EMERGENCY)
Facility: HOSPITAL | Age: 63
Discharge: HOME | End: 2023-12-14
Attending: EMERGENCY MEDICINE
Payer: COMMERCIAL

## 2023-12-14 VITALS
OXYGEN SATURATION: 96 % | TEMPERATURE: 98 F | SYSTOLIC BLOOD PRESSURE: 127 MMHG | BODY MASS INDEX: 26.41 KG/M2 | HEIGHT: 72 IN | DIASTOLIC BLOOD PRESSURE: 86 MMHG | HEART RATE: 72 BPM | WEIGHT: 195 LBS | RESPIRATION RATE: 20 BRPM

## 2023-12-14 DIAGNOSIS — J06.9 VIRAL URI WITH COUGH: Primary | ICD-10-CM

## 2023-12-14 DIAGNOSIS — J20.9 ACUTE BRONCHITIS, UNSPECIFIED ORGANISM: ICD-10-CM

## 2023-12-14 LAB
B PARAPERT DNA SPEC QL NAA+PROBE: NOT DETECTED
B PERT DNA SPEC QL NAA+PROBE: NOT DETECTED
C PNEUM DNA NPH QL NAA+NON-PROBE: NOT DETECTED
FLUAV SUBTYP SPEC NAA+PROBE: NOT DETECTED
FLUBV RNA ISLT QL NAA+PROBE: NOT DETECTED
HADV DNA SPEC NAA+PROBE: NOT DETECTED
HCOV 229E RNA SPEC QL NAA+PROBE: NOT DETECTED
HCOV HKU1 RNA SPEC QL NAA+PROBE: NOT DETECTED
HCOV NL63 RNA SPEC QL NAA+PROBE: NOT DETECTED
HCOV OC43 RNA SPEC QL NAA+PROBE: DETECTED
HMPV RNA NPH QL NAA+NON-PROBE: NOT DETECTED
HPIV1 RNA ISLT QL NAA+PROBE: NOT DETECTED
HPIV2 RNA SPEC QL NAA+PROBE: NOT DETECTED
HPIV3 RNA NPH QL NAA+PROBE: NOT DETECTED
HPIV4 P GENE NPH QL NAA+PROBE: NOT DETECTED
M PNEUMO IGG SER IA-ACNC: NOT DETECTED
RHINOVIRUS RNA SPEC NAA+PROBE: DETECTED
RSV RNA NPH QL NAA+NON-PROBE: NOT DETECTED
SARS-COV-2 RNA NPH QL NAA+NON-PROBE: NOT DETECTED

## 2023-12-14 PROCEDURE — 99283 EMERGENCY DEPT VISIT LOW MDM: CPT

## 2023-12-14 PROCEDURE — 94664 DEMO&/EVAL PT USE INHALER: CPT

## 2023-12-14 PROCEDURE — 0202U NFCT DS 22 TRGT SARS-COV-2: CPT | Performed by: PHYSICIAN ASSISTANT

## 2023-12-14 PROCEDURE — 94799 UNLISTED PULMONARY SVC/PX: CPT

## 2023-12-14 PROCEDURE — 94640 AIRWAY INHALATION TREATMENT: CPT

## 2023-12-14 PROCEDURE — 71045 X-RAY EXAM CHEST 1 VIEW: CPT

## 2023-12-14 PROCEDURE — 94761 N-INVAS EAR/PLS OXIMETRY MLT: CPT

## 2023-12-14 RX ORDER — IPRATROPIUM BROMIDE AND ALBUTEROL SULFATE 2.5; .5 MG/3ML; MG/3ML
3 SOLUTION RESPIRATORY (INHALATION) ONCE
Status: DISCONTINUED | OUTPATIENT
Start: 2023-12-14 | End: 2023-12-14

## 2023-12-14 RX ORDER — ALBUTEROL SULFATE 90 UG/1
2 AEROSOL, METERED RESPIRATORY (INHALATION) ONCE
Status: COMPLETED | OUTPATIENT
Start: 2023-12-14 | End: 2023-12-14

## 2023-12-14 RX ORDER — PREDNISONE 20 MG/1
20 TABLET ORAL 2 TIMES DAILY
Qty: 10 TABLET | Refills: 0 | Status: SHIPPED | OUTPATIENT
Start: 2023-12-14

## 2023-12-14 RX ADMIN — ALBUTEROL SULFATE 2 PUFF: 90 AEROSOL, METERED RESPIRATORY (INHALATION) at 16:35

## 2023-12-14 NOTE — ED PROVIDER NOTES
EMERGENCY DEPARTMENT ENCOUNTER    Room Number:  36/36  Date of encounter:  12/15/2023  PCP: Galileo Urbina MD  Historian: Patient  Chronic or social conditions impacting care (social determinants of health): Nothing    HPI:  Chief Complaint: Cough  A complete HPI/ROS/PMH/PSH/SH/FH are unobtainable due to: Nothing    Context: Kenrick Webster is a 63 y.o. male who presents to the ED c/o approximate 5 to 6-day history of cough, nasal congestion, headache, myalgias.   He denies any shortness of breath, fever, chest pain.  He denies any underlying heart or lung issues. 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  He reports that his symptoms initially improved however became worse last night resulting in his trip to the ER.  He was seen at [facility] several days ago.  He had a negative flu and COVID test.    Review of prior external notes (non-ED):   I reviewed urgent care office visit from 12/11/2023.  Patient seen for URI.  Prescribed Augmentin.    Review of prior external test results outside of this encounter:  I reviewed a negative COVID and flu swab from 12/11/2023    PAST MEDICAL HISTORY  Active Ambulatory Problems     Diagnosis Date Noted    DVT (deep venous thrombosis)     Benign essential hypertension     Renal insufficiency     Vitamin D deficiency     Hyperlipidemia     Gastroesophageal reflux disease without esophagitis 11/25/2015    Moderate persistent asthma without complication 12/19/2016    Other chest pain 10/20/2017    Mass 10/20/2017    Stress at home 02/27/2018    Personal history of colonic polyps 01/24/2023    Multiple subsegmental pulmonary emboli without acute cor pulmonale 04/25/2023     Resolved Ambulatory Problems     Diagnosis Date Noted    No Resolved Ambulatory Problems     Past Medical History:   Diagnosis Date    Anxiety and depression     Back pain     Bursitis     Chest pain     Colon polyp     DUNCAN (dyspnea on exertion)     Elevated cholesterol      Fatigue     H/O complete eye exam scheduled         PAST SURGICAL HISTORY  Past Surgical History:   Procedure Laterality Date    APPENDECTOMY      COLONOSCOPY      ENDOSCOPY      OTHER SURGICAL HISTORY      gallbladder testing or surgery    ROTATOR CUFF REPAIR           FAMILY HISTORY  Family History   Problem Relation Age of Onset    Heart disease Father     Stroke Sister     Hypertension Sister     Kidney disease Sister     Diabetes Brother          SOCIAL HISTORY  Social History     Socioeconomic History    Marital status: Single   Tobacco Use    Smoking status: Never    Smokeless tobacco: Never   Vaping Use    Vaping Use: Never used   Substance and Sexual Activity    Alcohol use: No    Drug use: No    Sexual activity: Defer         ALLERGIES  Patient has no known allergies.        REVIEW OF SYSTEMS  All systems reviewed and negative except for those discussed in HPI.       PHYSICAL EXAM    I have reviewed the triage vital signs and nursing notes.    ED Triage Vitals   Temp Heart Rate Resp BP SpO2   12/14/23 1525 12/14/23 1525 12/14/23 1525 12/14/23 1528 12/14/23 1525   98 °F (36.7 °C) 90 16 135/99 98 %      Temp src Heart Rate Source Patient Position BP Location FiO2 (%)   -- -- 12/14/23 1528 12/14/23 1528 --     Sitting Right arm        Physical Exam  GENERAL: Alert, oriented, well-appearing, not distressed  HENT: head atraumatic, no nuchal rigidity  EYES: no scleral icterus, EOMI  CV: regular rhythm, regular rate, no murmur  RESPIRATORY: normal effort, mild expiratory wheezing  ABDOMEN: soft, nontender  MUSCULOSKELETAL: no deformity, FROM, no calf swelling or tenderness  NEURO: alert, moves all extremities, follows commands  SKIN: warm, dry        LAB RESULTS  Recent Results (from the past 24 hour(s))   Respiratory Panel PCR w/COVID-19(SARS-CoV-2) SANJAY/JOSELYN/DIGNA/PAD/COR/MELBA In-House, NP Swab in UTM/VTM, 2 HR TAT - Swab, Nasopharynx    Collection Time: 12/14/23  3:56 PM    Specimen: Nasopharynx; Swab   Result  Value Ref Range    ADENOVIRUS, PCR Not Detected Not Detected    Coronavirus 229E Not Detected Not Detected    Coronavirus HKU1 Not Detected Not Detected    Coronavirus NL63 Not Detected Not Detected    Coronavirus OC43 Detected (A) Not Detected    COVID19 Not Detected Not Detected - Ref. Range    Human Metapneumovirus Not Detected Not Detected    Human Rhinovirus/Enterovirus Detected (A) Not Detected    Influenza A PCR Not Detected Not Detected    Influenza B PCR Not Detected Not Detected    Parainfluenza Virus 1 Not Detected Not Detected    Parainfluenza Virus 2 Not Detected Not Detected    Parainfluenza Virus 3 Not Detected Not Detected    Parainfluenza Virus 4 Not Detected Not Detected    RSV, PCR Not Detected Not Detected    Bordetella pertussis pcr Not Detected Not Detected    Bordetella parapertussis PCR Not Detected Not Detected    Chlamydophila pneumoniae PCR Not Detected Not Detected    Mycoplasma pneumo by PCR Not Detected Not Detected       Ordered the above labs and independently reviewed the results.        RADIOLOGY  XR Chest 1 View    Result Date: 12/14/2023  XR CHEST 1 VW-12/14/2023  HISTORY: Cough.  Heart size is within normal limits. Lungs appear free of acute infiltrates. Bones and soft tissues are unremarkable.      1. No acute process.   This report was finalized on 12/14/2023 3:54 PM by Dr. Dickson Morse M.D on Workstation: [id number]       I ordered the above noted radiological studies. Reviewed by me and discussed with radiologist.  See dictation for official radiology interpretation.      MEDICATIONS GIVEN IN ER    Medications   albuterol sulfate HFA (PROVENTIL HFA;VENTOLIN HFA;PROAIR HFA) inhaler 2 puff (2 puffs Inhalation Given 12/14/23 1635)         ADDITIONAL ORDERS CONSIDERED BUT NOT ORDERED:  Considered admission however patient is well-appearing.      PROGRESS, DATA ANALYSIS, CONSULTS, AND MEDICAL DECISION MAKING    All labs have been independently interpreted by myself.  All  radiology studies have been independently interpreted by myself and discussed with radiologist dictating the report.   EKG's independently interpreted by myself.  Discussion below represents my analysis of pertinent findings related to patient's condition, differential diagnosis, treatment plan and final disposition.    I have discussed case with Dr. Marcano, emergency room physician.  He has performed his own bedside examination and agrees with treatment plan.    ED Course as of 12/15/23 0029   Thu Dec 14, 2023   1540 Patient presents with 5-day history of cough, nasal congestion, headache.  He denies any fever, shortness of breath, chest pain.  He has no underlying heart or lung issues.  He has been to the [facility] care [place] and had a negative COVID and flu swab.  His cough has continued resulting in his visit to the ER.  Plan for chest x-ray, RV, breathing treatment.  Stable vitals.  We will hold off on blood work at this time. [EE]   1720 Chest x-ray independently interpreted myself shows no acute infiltrate. [EE]   1735 Recheck of patient.  He states he feels better after the nebulizer treatment.  His lungs sound much improved.  We will send him home with steroids, albuterol inhaler.  I will call him with his viral swab results.  He is well-appearing.  No evidence of sepsis or respiratory failure. [EE]      ED Course User Index  [EE] Eulalio Grande PA       AS OF 00:29 EST VITALS:    BP - 127/86  HR - 72  TEMP - 98 °F (36.7 °C)  O2 SATS - 96%        DIAGNOSIS  Final diagnoses:   Viral URI with cough   Acute bronchitis, unspecified organism         DISPOSITION  Discharged      Dictated utilizing Dragon dictation     Eulalio Grande PA  12/15/23 0030

## 2023-12-14 NOTE — Clinical Note
[facility] EMERGENCY DEPARTMENT  [address] [facility] [address]  Phone: [phone]    Kenrick Webster was seen and treated in our emergency department on 12/14/2023.  He may return to work on 12/18/2023.         Thank you for choosing [facility].    Lexus Plaza RN

## 2023-12-14 NOTE — ED PROVIDER NOTES
"The MALCOM and I have discussed this patient's history, physical exam, and treatment plan.  I have reviewed the documentation and personally had a face to face interaction with the patient. I affirm the documentation and agree with the treatment and plan.  The attached note describes my personal findings.      I provided a substantive portion of the care of the patient.  I personally performed the physical exam in its entirety, and below are my findings.     Brief history of present illness: 63-year-old male with cough and sinus congestion as well as drainage but no fever over the last 4 to 5 days.  Seen in urgent care and started on medications with transient relief of symptoms though they seem worse today.  No exertional chest pain or shortness of breath reported.  No hemoptysis reported.  Patient is chronically anticoagulated for history of PEs in the past.    Physical exam:    /99 (BP Location: Right arm, Patient Position: Sitting)   Pulse 90   Temp 98 °F (36.7 °C)   Resp 16   Ht 182.9 cm (72\")   Wt 88.5 kg (195 lb)   SpO2 98%   BMI 26.45 kg/m²       Physical Exam   Constitutional: No distress.  Nontoxic  HENT:  Head: Normocephalic and atraumatic.   Oropharynx: Mucous membranes are moist.  Evidence of posterior pharyngeal cobblestoning noted.  Eyes: . No scleral icterus.   Neck: Normal range of motion. Neck supple.   Cardiovascular: Pink warm and well perfused throughout.  Regular rate and rhythm  Pulmonary/Chest: No respiratory distress.  Clear to auscultation bilaterally.  Abdominal: Soft. There is no rebound or rigidity  Musculoskeletal: Moves all extremities equally.    Neurological: Alert and oriented.  Speech fluent and easily intelligible  Skin: Skin is pink, warm, and dry.   Psychiatric: Mood and affect normal.   Nursing note and vitals reviewed.        MDM:    My differential diagnosis for cough includes but is not limited to:  Upper respiratory infection, bronchitis, pneumonia, COPD " exacerbation, cough variant asthma, cardiac asthma, coronary artery disease, congestive heart failure, bacterial, viral or lung infections, lung cancer, aspiration pneumonitis, aspiration of foreign body and Covid -19    RADIOLOGY      Study: Single view chest  Findings: No pneumothorax or focal infiltrate appreciated  I independently viewed and interpreted these images contemporaneously with treatment.       Please note that portions of this were completed with a voice recognition program.       Note Disclaimer: At [facility], we believe that sharing information builds trust and better relationships. You are receiving this note because you are receiving care at [facility] or recently visited. It is possible you will see health information before a provider has talked with you about it. This kind of information can be easy to misunderstand. To help you fully understand what it means for your health, we urge you to discuss this note with your provider.     Mayur Marcano MD  12/14/23 3199

## 2023-12-14 NOTE — ED NOTES
Patient seen at [facility] a couple of days ago-told him if he was not feeling better to go to the ER. Patient has a cough and congestion. He was tested for covid and flu and they were negative. Patient has no fever at this time

## 2023-12-14 NOTE — DISCHARGE INSTRUCTIONS
Continue your albuterol inhaler 2 puffs every 4-6 hours as needed for shortness of breath    Return to ER for any worsening symptoms

## 2024-02-10 ENCOUNTER — HOSPITAL ENCOUNTER (EMERGENCY)
Facility: HOSPITAL | Age: 64
Discharge: HOME | End: 2024-02-10
Attending: EMERGENCY MEDICINE
Payer: COMMERCIAL

## 2024-02-10 VITALS
TEMPERATURE: 96.4 F | SYSTOLIC BLOOD PRESSURE: 128 MMHG | HEIGHT: 72 IN | WEIGHT: 188 LBS | OXYGEN SATURATION: 100 % | HEART RATE: 57 BPM | RESPIRATION RATE: 16 BRPM | BODY MASS INDEX: 25.47 KG/M2 | DIASTOLIC BLOOD PRESSURE: 95 MMHG

## 2024-02-10 DIAGNOSIS — R63.0 DECREASED APPETITE: ICD-10-CM

## 2024-02-10 DIAGNOSIS — R10.9 INTERMITTENT ABDOMINAL PAIN: Primary | ICD-10-CM

## 2024-02-10 LAB
ALBUMIN SERPL-MCNC: 4 G/DL (ref 3.5–5.2)
ALBUMIN/GLOB SERPL: 1.4 G/DL
ALP SERPL-CCNC: 70 U/L (ref 39–117)
ALT SERPL W P-5'-P-CCNC: 15 U/L (ref 1–41)
ANION GAP SERPL CALCULATED.3IONS-SCNC: 11.2 MMOL/L (ref 5–15)
AST SERPL-CCNC: 23 U/L (ref 1–40)
BASOPHILS # BLD AUTO: 0.05 10*3/MM3 (ref 0–0.2)
BASOPHILS NFR BLD AUTO: 0.8 % (ref 0–1.5)
BILIRUB SERPL-MCNC: 1.4 MG/DL (ref 0–1.2)
BILIRUB UR QL STRIP: NEGATIVE
BUN SERPL-MCNC: 9 MG/DL (ref 8–23)
BUN/CREAT SERPL: 6.6 (ref 7–25)
CALCIUM SPEC-SCNC: 8.9 MG/DL (ref 8.6–10.5)
CHLORIDE SERPL-SCNC: 103 MMOL/L (ref 98–107)
CLARITY UR: CLEAR
CO2 SERPL-SCNC: 25.8 MMOL/L (ref 22–29)
COLOR UR: YELLOW
CREAT SERPL-MCNC: 1.37 MG/DL (ref 0.76–1.27)
D-LACTATE SERPL-SCNC: 0.8 MMOL/L (ref 0.5–2)
DEPRECATED RDW RBC AUTO: 44.2 FL (ref 37–54)
EGFRCR SERPLBLD CKD-EPI 2021: 58.7 ML/MIN/1.73
EOSINOPHIL # BLD AUTO: 0.34 10*3/MM3 (ref 0–0.4)
EOSINOPHIL NFR BLD AUTO: 5.5 % (ref 0.3–6.2)
ERYTHROCYTE [DISTWIDTH] IN BLOOD BY AUTOMATED COUNT: 13.7 % (ref 12.3–15.4)
GLOBULIN UR ELPH-MCNC: 2.8 GM/DL
GLUCOSE SERPL-MCNC: 88 MG/DL (ref 65–99)
GLUCOSE UR STRIP-MCNC: NEGATIVE MG/DL
HCT VFR BLD AUTO: 43.6 % (ref 37.5–51)
HGB BLD-MCNC: 14.3 G/DL (ref 13–17.7)
HGB UR QL STRIP.AUTO: NEGATIVE
HOLD SPECIMEN: NORMAL
HOLD SPECIMEN: NORMAL
IMM GRANULOCYTES # BLD AUTO: 0.01 10*3/MM3 (ref 0–0.05)
IMM GRANULOCYTES NFR BLD AUTO: 0.2 % (ref 0–0.5)
KETONES UR QL STRIP: NEGATIVE
LEUKOCYTE ESTERASE UR QL STRIP.AUTO: NEGATIVE
LIPASE SERPL-CCNC: 28 U/L (ref 13–60)
LYMPHOCYTES # BLD AUTO: 1.82 10*3/MM3 (ref 0.7–3.1)
LYMPHOCYTES NFR BLD AUTO: 29.4 % (ref 19.6–45.3)
MCH RBC QN AUTO: 29.1 PG (ref 26.6–33)
MCHC RBC AUTO-ENTMCNC: 32.8 G/DL (ref 31.5–35.7)
MCV RBC AUTO: 88.6 FL (ref 79–97)
MONOCYTES # BLD AUTO: 0.71 10*3/MM3 (ref 0.1–0.9)
MONOCYTES NFR BLD AUTO: 11.5 % (ref 5–12)
NEUTROPHILS NFR BLD AUTO: 3.26 10*3/MM3 (ref 1.7–7)
NEUTROPHILS NFR BLD AUTO: 52.6 % (ref 42.7–76)
NITRITE UR QL STRIP: NEGATIVE
NRBC BLD AUTO-RTO: 0 /100 WBC (ref 0–0.2)
PH UR STRIP.AUTO: 6 [PH] (ref 5–8)
PLATELET # BLD AUTO: 302 10*3/MM3 (ref 140–450)
PMV BLD AUTO: 9 FL (ref 6–12)
POTASSIUM SERPL-SCNC: 3.6 MMOL/L (ref 3.5–5.2)
PROT SERPL-MCNC: 6.8 G/DL (ref 6–8.5)
PROT UR QL STRIP: NEGATIVE
RBC # BLD AUTO: 4.92 10*6/MM3 (ref 4.14–5.8)
SODIUM SERPL-SCNC: 140 MMOL/L (ref 136–145)
SP GR UR STRIP: 1.01 (ref 1–1.03)
UROBILINOGEN UR QL STRIP: NORMAL
WBC NRBC COR # BLD AUTO: 6.19 10*3/MM3 (ref 3.4–10.8)
WHOLE BLOOD HOLD COAG: NORMAL
WHOLE BLOOD HOLD SPECIMEN: NORMAL

## 2024-02-10 PROCEDURE — 83690 ASSAY OF LIPASE: CPT | Performed by: EMERGENCY MEDICINE

## 2024-02-10 PROCEDURE — 83605 ASSAY OF LACTIC ACID: CPT | Performed by: EMERGENCY MEDICINE

## 2024-02-10 PROCEDURE — 85025 COMPLETE CBC W/AUTO DIFF WBC: CPT | Performed by: EMERGENCY MEDICINE

## 2024-02-10 PROCEDURE — 80053 COMPREHEN METABOLIC PANEL: CPT | Performed by: EMERGENCY MEDICINE

## 2024-02-10 PROCEDURE — 81003 URINALYSIS AUTO W/O SCOPE: CPT | Performed by: EMERGENCY MEDICINE

## 2024-02-10 PROCEDURE — 99283 EMERGENCY DEPT VISIT LOW MDM: CPT

## 2024-02-10 RX ORDER — OMEPRAZOLE 20 MG/1
20 CAPSULE, DELAYED RELEASE ORAL DAILY
Qty: 30 CAPSULE | Refills: 0 | Status: SHIPPED | OUTPATIENT
Start: 2024-02-10

## 2024-02-10 RX ORDER — SODIUM CHLORIDE 0.9 % (FLUSH) 0.9 %
10 SYRINGE (ML) INJECTION AS NEEDED
Status: DISCONTINUED | OUTPATIENT
Start: 2024-02-10 | End: 2024-02-10 | Stop reason: HOSPADM

## 2024-02-10 NOTE — ED NOTES
Pt has had abd pain since November.  He has tried pepto bismol but it is not helping.  He also reports he recently has had left leg pain x 3 weeks.  con

## 2024-02-10 NOTE — ED PROVIDER NOTES
" EMERGENCY DEPARTMENT ENCOUNTER    Room Number:  40/40  PCP: Galileo Urbina MD  Historian: Patient      HPI:  Chief Complaint: Abdominal pain  A complete HPI/ROS/PMH/PSH/SH/FH are unobtainable due to: Nothing  Context: Kenrick Webster is a 63 y.o. male with a medical history of hypertension, hyperlipidemia, GERD, who presents to the ED c/o abdominal pain.  Patient reports intermittent upper abdominal pain since November.  Pain is sometimes in the right upper quadrant and other times in the left upper quadrant.  Pain is described as \"stinging\".  Pain usually last for several minutes.  Pain is not related to eating.  Nothing makes the pain better or worse.  He has taken Pepto-Bismol with minimal relief.  He reports decreased appetite.  Denies fever, chills, chest pain, shortness of breath, nausea, vomiting, diarrhea, constipation, flank pain, hematuria, or dysuria.  He has had a cholecystectomy and appendectomy.  He has not yet seen his PCP for the symptoms. Pain is not really any different today but he he says that he decided to come to the ED to get checked out.              PAST MEDICAL HISTORY  Active Ambulatory Problems     Diagnosis Date Noted    DVT (deep venous thrombosis)     Benign essential hypertension     Renal insufficiency     Vitamin D deficiency     Hyperlipidemia     Gastroesophageal reflux disease without esophagitis 11/25/2015    Moderate persistent asthma without complication 12/19/2016    Other chest pain 10/20/2017    Mass 10/20/2017    Stress at home 02/27/2018    Personal history of colonic polyps 01/24/2023    Multiple subsegmental pulmonary emboli without acute cor pulmonale 04/25/2023     Resolved Ambulatory Problems     Diagnosis Date Noted    No Resolved Ambulatory Problems     Past Medical History:   Diagnosis Date    Anxiety and depression     Back pain     Bursitis     Chest pain     Colon polyp     DUNCAN (dyspnea on exertion)     Elevated cholesterol     Fatigue     H/O complete eye " exam scheduled         PAST SURGICAL HISTORY  Past Surgical History:   Procedure Laterality Date    APPENDECTOMY      COLONOSCOPY      ENDOSCOPY      OTHER SURGICAL HISTORY      gallbladder testing or surgery    ROTATOR CUFF REPAIR           FAMILY HISTORY  Family History   Problem Relation Age of Onset    Heart disease Father     Stroke Sister     Hypertension Sister     Kidney disease Sister     Diabetes Brother          SOCIAL HISTORY  Social History     Socioeconomic History    Marital status: Single   Tobacco Use    Smoking status: Never    Smokeless tobacco: Never   Vaping Use    Vaping Use: Never used   Substance and Sexual Activity    Alcohol use: No    Drug use: No    Sexual activity: Defer         ALLERGIES  Patient has no known allergies.    REVIEW OF SYSTEMS  Review of Systems  Included in HPI  All systems reviewed and negative except for those discussed in HPI.      PHYSICAL EXAM  ED Triage Vitals   Temp Heart Rate Resp BP SpO2   02/10/24 1635 02/10/24 1635 02/10/24 1635 02/10/24 1708 02/10/24 1635   96.4 °F (35.8 °C) 82 16 134/81 97 %      Temp src Heart Rate Source Patient Position BP Location FiO2 (%)   02/10/24 1635 02/10/24 1635 02/10/24 1708 02/10/24 1708 --   Tympanic Monitor Sitting Right arm        Physical Exam      GENERAL: Awake, alert, oriented x 3.  Well-developed, well-nourished male.  Resting comfortably in no acute distress  HENT: NCAT, nares patent  EYES: no scleral icterus  CV: regular rhythm, normal rate  RESPIRATORY: normal effort, clear to auscultation bilaterally  ABDOMEN: soft, nondistended, nontender, no CVA tenderness  MUSCULOSKELETAL: Extremities are nontender with full range of motion  NEURO: Speech is normal.  No facial droop.  PSYCH:  calm, cooperative  SKIN: warm, dry    Vital signs and nursing notes reviewed.          LAB RESULTS  Recent Results (from the past 24 hour(s))   Comprehensive Metabolic Panel    Collection Time: 02/10/24  5:52 PM    Specimen: Blood   Result  Value Ref Range    Glucose 88 65 - 99 mg/dL    BUN 9 8 - 23 mg/dL    Creatinine 1.37 (H) 0.76 - 1.27 mg/dL    Sodium 140 136 - 145 mmol/L    Potassium 3.6 3.5 - 5.2 mmol/L    Chloride 103 98 - 107 mmol/L    CO2 25.8 22.0 - 29.0 mmol/L    Calcium 8.9 8.6 - 10.5 mg/dL    Total Protein 6.8 6.0 - 8.5 g/dL    Albumin 4.0 3.5 - 5.2 g/dL    ALT (SGPT) 15 1 - 41 U/L    AST (SGOT) 23 1 - 40 U/L    Alkaline Phosphatase 70 39 - 117 U/L    Total Bilirubin 1.4 (H) 0.0 - 1.2 mg/dL    Globulin 2.8 gm/dL    A/G Ratio 1.4 g/dL    BUN/Creatinine Ratio 6.6 (L) 7.0 - 25.0    Anion Gap 11.2 5.0 - 15.0 mmol/L    eGFR 58.7 (L) >60.0 mL/min/1.73   Lipase    Collection Time: 02/10/24  5:52 PM    Specimen: Blood   Result Value Ref Range    Lipase 28 13 - 60 U/L   Lactic Acid, Plasma    Collection Time: 02/10/24  5:52 PM    Specimen: Blood   Result Value Ref Range    Lactate 0.8 0.5 - 2.0 mmol/L   Green Top (Gel)    Collection Time: 02/10/24  5:52 PM   Result Value Ref Range    Extra Tube Hold for add-ons.    Lavender Top    Collection Time: 02/10/24  5:52 PM   Result Value Ref Range    Extra Tube hold for add-on    Gold Top - SST    Collection Time: 02/10/24  5:52 PM   Result Value Ref Range    Extra Tube Hold for add-ons.    Light Blue Top    Collection Time: 02/10/24  5:52 PM   Result Value Ref Range    Extra Tube Hold for add-ons.    CBC Auto Differential    Collection Time: 02/10/24  5:52 PM    Specimen: Blood   Result Value Ref Range    WBC 6.19 3.40 - 10.80 10*3/mm3    RBC 4.92 4.14 - 5.80 10*6/mm3    Hemoglobin 14.3 13.0 - 17.7 g/dL    Hematocrit 43.6 37.5 - 51.0 %    MCV 88.6 79.0 - 97.0 fL    MCH 29.1 26.6 - 33.0 pg    MCHC 32.8 31.5 - 35.7 g/dL    RDW 13.7 12.3 - 15.4 %    RDW-SD 44.2 37.0 - 54.0 fl    MPV 9.0 6.0 - 12.0 fL    Platelets 302 140 - 450 10*3/mm3    Neutrophil % 52.6 42.7 - 76.0 %    Lymphocyte % 29.4 19.6 - 45.3 %    Monocyte % 11.5 5.0 - 12.0 %    Eosinophil % 5.5 0.3 - 6.2 %    Basophil % 0.8 0.0 - 1.5 %    Immature  Grans % 0.2 0.0 - 0.5 %    Neutrophils, Absolute 3.26 1.70 - 7.00 10*3/mm3    Lymphocytes, Absolute 1.82 0.70 - 3.10 10*3/mm3    Monocytes, Absolute 0.71 0.10 - 0.90 10*3/mm3    Eosinophils, Absolute 0.34 0.00 - 0.40 10*3/mm3    Basophils, Absolute 0.05 0.00 - 0.20 10*3/mm3    Immature Grans, Absolute 0.01 0.00 - 0.05 10*3/mm3    nRBC 0.0 0.0 - 0.2 /100 WBC   Urinalysis With Microscopic If Indicated (No Culture) - Urine, Clean Catch    Collection Time: 02/10/24  5:53 PM    Specimen: Urine, Clean Catch   Result Value Ref Range    Color, UA Yellow Yellow, Straw    Appearance, UA Clear Clear    pH, UA 6.0 5.0 - 8.0    Specific Gravity, UA 1.015 1.005 - 1.030    Glucose, UA Negative Negative    Ketones, UA Negative Negative    Bilirubin, UA Negative Negative    Blood, UA Negative Negative    Protein, UA Negative Negative    Leuk Esterase, UA Negative Negative    Nitrite, UA Negative Negative    Urobilinogen, UA 0.2 E.U./dL 0.2 - 1.0 E.U./dL       Ordered the above labs and reviewed the results.        RADIOLOGY  No Radiology Exams Resulted Within Past 24 Hours    Ordered the above noted radiological studies. Reviewed by me in PACS.            PROCEDURES  Procedures        OUTPATIENT MEDICATION MANAGEMENT:  Current Facility-Administered Medications Ordered in Epic   Medication Dose Route Frequency Provider Last Rate Last Admin    sodium chloride 0.9 % flush 10 mL  10 mL Intravenous PRN Jorge A Mendoza MD         Current Outpatient Medications Ordered in Epic   Medication Sig Dispense Refill    amLODIPine (NORVASC) 5 MG tablet Take 1 tablet by mouth Daily. 90 tablet 1    apixaban (Eliquis) 5 MG tablet tablet Take 1 tablet by mouth Every 12 (Twelve) Hours. 180 tablet 1    atorvastatin (LIPITOR) 10 MG tablet Take 1 tablet by mouth Daily. 90 tablet 1    cetirizine (zyrTEC) 10 MG tablet Take 1 tablet by mouth Daily for 30 days. 30 tablet 0    cholecalciferol (VITAMIN D3) 1000 UNITS tablet Take 2 tablets by mouth Daily.       Cholecalciferol 50 MCG (2000 UT) tablet Take 1 tablet by mouth Daily.      EPINEPHrine (EPIPEN JR IJ) Inject  as directed As Needed.      EPINEPHrine (EPIPEN) 0.3 MG/0.3ML solution auto-injector injection INJECT 0.3 MILLILITER (0.3 MG) BY INTRAMUSCULAR ROUTE ONCE AS NEEDED FOR ANAPHYLAXIS      losartan (COZAAR) 50 MG tablet Take 1 tablet by mouth Daily. 90 tablet 1    montelukast (SINGULAIR) 10 MG tablet Take 1 tablet by mouth Every Night.      omeprazole (priLOSEC) 20 MG capsule Take 1 capsule by mouth Daily. 30 capsule 0    predniSONE (DELTASONE) 20 MG tablet Take 1 tablet by mouth 2 (Two) Times a Day. 10 tablet 0           MEDICATIONS GIVEN IN ER  Medications   sodium chloride 0.9 % flush 10 mL (has no administration in time range)                   MEDICAL DECISION MAKING, PROGRESS, and CONSULTS    All labs have been independently reviewed by me.  All radiology studies have been reviewed by me and I have also reviewed the radiology report.   EKG's independently viewed and interpreted by me.  Discussion below represents my analysis of pertinent findings related to patient's condition, differential diagnosis, treatment plan and final disposition.      Additional sources:    - Discussed/ obtained information from independent historians: None    -Prescription drug monitoring program review:     N/A      - External (non-ED) record review: Patient was seen at urgent care last month for abdominal pain.  CT abdomen/pelvis done in June 2023 showed stable low-density masses within the head/neck of the pancreas.  There were nonobstructing left renal stones.    - Chronic or social conditions impacting patient care (Social Determinants of Health): None          Orders placed during this visit:  Orders Placed This Encounter   Procedures    [place] Draw    Comprehensive Metabolic Panel    Lipase    Urinalysis With Microscopic If Indicated (No Culture) - Urine, Clean Catch    Lactic Acid, Plasma    CBC Auto Differential     NPO Diet NPO Type: Strict NPO    Undress & Gown    Insert Peripheral IV    CBC & Differential    Green Top (Gel)    Lavender Top    Gold Top - SST    Light Blue Top         Additional orders considered but not ordered:  CT abdomen/pelvis: Abdominal exam was benign.  Labs were unremarkable.  I have low suspicion for acute intra-abdominal process.        Differential diagnosis includes, but is not limited to:    Differential diagnosis includes but is not limited to:  - hepatobiliary pathology such as cholecystitis, cholangitis, and symptomatic cholelithiasis  - Pancreatitis  - Dyspepsia  - Small bowel obstruction  - Appendicitis  - Diverticulitis  - UTI including pyelonephritis  - Ureteral stone  - Zoster  - Colitis, including infectious and ischemic  - Atypical ACS        Independent interpretation of labs, radiology studies, and discussions with consultants:  ED Course as of 02/10/24 2104   Sat Feb 10, 2024   1819 WBC: 6.19 [WH]   1819 Hemoglobin: 14.3  Urinalysis is normal [WH]   1837 Lipase: 28 [WH]   1838 Creatinine(!): 1.37  Stable [WH]   1838 Lactate: 0.8 [WH]   1918 Patient is resting comfortably.  Test results were discussed with him.  He will be discharged with a prescription for Prilosec.  I advised him to follow-up with his PCP.  If symptoms persist, he may ultimately need upper endoscopy. []   1930 MDM: Patient presented to ED complaining of intermittent upper abdominal pain for the past few months.  His abdominal exam was benign.  Labs were unremarkable except for mildly elevated creatinine which was stable.  Patient has a history of GERD but is not currently on medications for this.  He will be discharged on Prilosec. [WH]      ED Course User Index  [] Jorge A Mendoza MD         COMPLEXITY OF CARE        DIAGNOSIS  Final diagnoses:   Intermittent abdominal pain   Decreased appetite         DISPOSITION  DISCHARGE    Patient discharged in stable condition.    Reviewed implications of results,  diagnosis, meds, responsibility to follow up, warning signs and symptoms of possible worsening, potential complications and reasons to return to ER, including worsening/persistent symptoms, vomiting, diarrhea, fever, or other concern.    Patient/Family voiced understanding of above instructions.    Discussed plan for discharge, as there is no emergent indication for admission. Patient referred to primary care provider for BP management due to today's BP. Pt/family is agreeable and understands need for follow up and repeat testing.  Pt is aware that discharge does not mean that nothing is wrong but it indicates no emergency is present that requires admission and they must continue care with follow-up as given below or physician of their choice.     FOLLOW-UP  Galileo Urbina MD  [address] [address] [id number] [phone]    Schedule an appointment as soon as possible for a visit            Medication List        New Prescriptions      omeprazole 20 MG capsule  Commonly known as: priLOSEC  Take 1 capsule by mouth Daily.               Where to Get Your Medications        These medications were sent to [facility]/pharmacy #[id number] - [facility], [place] - [address] AT [facility] [phone] [facility] [phone]   [address] [facility] [address]      Phone: [phone]   omeprazole 20 MG capsule                   Latest Documented Vital Signs:  AS OF 21:04 EST VITALS:    BP - 128/95  HR - 57  TEMP - 96.4 °F (35.8 °C) (Tympanic)  O2 SATS - 100%            --    Please note that portions of this were completed with a voice recognition program.       Note Disclaimer: At [facility], we believe that sharing information builds trust and better relationships. You are receiving this note because you are receiving care at [facility] or recently visited. It is possible you will see health information before a provider has talked with you about it. This kind of information  can be easy to misunderstand. To help you fully understand what it means for your health, we urge you to discuss this note with your provider.             Jorge A Mendoza MD  02/10/24 2106

## 2024-02-11 NOTE — DISCHARGE INSTRUCTIONS
Take medication as prescribed.  Follow-up with your primary care doctor soon as possible.  Return to the emergency department for worsening/persistent pain, fever, vomiting, diarrhea, or other concern.

## 2024-04-02 NOTE — PROGRESS NOTES
"  Chief Complaint:   Chief Complaint   Patient presents with    Hypertension     Med refill  / no labs /[facility]     Hyperlipidemia    DVT    Fatigue    right knee pain      No injury / hx of DVT / PT ON ELIQUIS   / NO INJURY X 2 DAYS         Kenrick Webster 63 y.o. male who presents today for Medical Management of the below listed issues. He  has a problem list of   Patient Active Problem List   Diagnosis    DVT (deep venous thrombosis)    Benign essential hypertension    Renal insufficiency    Vitamin D deficiency    Hyperlipidemia    Gastroesophageal reflux disease without esophagitis    Moderate persistent asthma without complication    Other chest pain    Mass    Stress at home    Personal history of colonic polyps    Multiple subsegmental pulmonary emboli without acute cor pulmonale   .  Since the last visit, He has overall felt well overall, although has had a 1 year history of slowly progressive fatigue.  Patient is a  in the [employer] system, which is very deficient on 's right now, and he is having to work 9-hour plus days for the last year.  Had recent lab work showing no anemia or issues with his kidneys or liver out of his normal.    Patient also reports a \"stinging sensation\" of the medial part of the upper right leg that started yesterday.  This is the same feeling he had in the same leg that he had a prior DVT.  He is quite faithful with taking his Eliquis, but does report missing a dose or 2 here and there.      he has been compliant with   Current Outpatient Medications:     amLODIPine (NORVASC) 5 MG tablet, Take 1 tablet by mouth Daily., Disp: 90 tablet, Rfl: 1    apixaban (Eliquis) 5 MG tablet tablet, Take 1 tablet by mouth Every 12 (Twelve) Hours., Disp: 180 tablet, Rfl: 1    atorvastatin (LIPITOR) 10 MG tablet, Take 1 tablet by mouth Daily., Disp: 90 tablet, Rfl: 1    baclofen (LIORESAL) 10 MG tablet, Take 1 tablet by mouth., Disp: , Rfl:     cetirizine " "(zyrTEC) 10 MG tablet, Take 1 tablet by mouth Daily for 30 days., Disp: 30 tablet, Rfl: 0    cholecalciferol (VITAMIN D3) 1000 UNITS tablet, Take 2 tablets by mouth Daily., Disp: , Rfl:     Cholecalciferol 50 MCG (2000 UT) tablet, Take 1 tablet by mouth Daily., Disp: , Rfl:     EPINEPHrine (EPIPEN JR IJ), Inject  as directed As Needed., Disp: , Rfl:     EPINEPHrine (EPIPEN) 0.3 MG/0.3ML solution auto-injector injection, INJECT 0.3 MILLILITER (0.3 MG) BY INTRAMUSCULAR ROUTE ONCE AS NEEDED FOR ANAPHYLAXIS, Disp: , Rfl:     losartan (COZAAR) 50 MG tablet, Take 1 tablet by mouth Daily., Disp: 90 tablet, Rfl: 1    montelukast (SINGULAIR) 10 MG tablet, Take 1 tablet by mouth Every Night., Disp: , Rfl:     omeprazole (priLOSEC) 20 MG capsule, Take 1 capsule by mouth Daily., Disp: 30 capsule, Rfl: 0    predniSONE (DELTASONE) 20 MG tablet, Take 1 tablet by mouth 2 (Two) Times a Day., Disp: 10 tablet, Rfl: 0.  He denies medication side effects.    All of the other chronic condition(s) listed above are stable w/o issues.    /87   Pulse 70   Temp 98.1 °F (36.7 °C) (Oral)   Resp 16   Ht 182.9 cm (72\")   Wt 86.2 kg (190 lb)   SpO2 98%   BMI 25.77 kg/m²     Results for orders placed or performed during the hospital encounter of 02/10/24   Comprehensive Metabolic Panel    Specimen: Blood   Result Value Ref Range    Glucose 88 65 - 99 mg/dL    BUN 9 8 - 23 mg/dL    Creatinine 1.37 (H) 0.76 - 1.27 mg/dL    Sodium 140 136 - 145 mmol/L    Potassium 3.6 3.5 - 5.2 mmol/L    Chloride 103 98 - 107 mmol/L    CO2 25.8 22.0 - 29.0 mmol/L    Calcium 8.9 8.6 - 10.5 mg/dL    Total Protein 6.8 6.0 - 8.5 g/dL    Albumin 4.0 3.5 - 5.2 g/dL    ALT (SGPT) 15 1 - 41 U/L    AST (SGOT) 23 1 - 40 U/L    Alkaline Phosphatase 70 39 - 117 U/L    Total Bilirubin 1.4 (H) 0.0 - 1.2 mg/dL    Globulin 2.8 gm/dL    A/G Ratio 1.4 g/dL    BUN/Creatinine Ratio 6.6 (L) 7.0 - 25.0    Anion Gap 11.2 5.0 - 15.0 mmol/L    eGFR 58.7 (L) >60.0 mL/min/1.73 "   Lipase    Specimen: Blood   Result Value Ref Range    Lipase 28 13 - 60 U/L   Urinalysis With Microscopic If Indicated (No Culture) - Urine, Clean Catch    Specimen: Urine, Clean Catch   Result Value Ref Range    Color, UA Yellow Yellow, Straw    Appearance, UA Clear Clear    pH, UA 6.0 5.0 - 8.0    Specific Gravity, UA 1.015 1.005 - 1.030    Glucose, UA Negative Negative    Ketones, UA Negative Negative    Bilirubin, UA Negative Negative    Blood, UA Negative Negative    Protein, UA Negative Negative    Leuk Esterase, UA Negative Negative    Nitrite, UA Negative Negative    Urobilinogen, UA 0.2 E.U./dL 0.2 - 1.0 E.U./dL   Lactic Acid, Plasma    Specimen: Blood   Result Value Ref Range    Lactate 0.8 0.5 - 2.0 mmol/L   CBC Auto Differential    Specimen: Blood   Result Value Ref Range    WBC 6.19 3.40 - 10.80 10*3/mm3    RBC 4.92 4.14 - 5.80 10*6/mm3    Hemoglobin 14.3 13.0 - 17.7 g/dL    Hematocrit 43.6 37.5 - 51.0 %    MCV 88.6 79.0 - 97.0 fL    MCH 29.1 26.6 - 33.0 pg    MCHC 32.8 31.5 - 35.7 g/dL    RDW 13.7 12.3 - 15.4 %    RDW-SD 44.2 37.0 - 54.0 fl    MPV 9.0 6.0 - 12.0 fL    Platelets 302 140 - 450 10*3/mm3    Neutrophil % 52.6 42.7 - 76.0 %    Lymphocyte % 29.4 19.6 - 45.3 %    Monocyte % 11.5 5.0 - 12.0 %    Eosinophil % 5.5 0.3 - 6.2 %    Basophil % 0.8 0.0 - 1.5 %    Immature Grans % 0.2 0.0 - 0.5 %    Neutrophils, Absolute 3.26 1.70 - 7.00 10*3/mm3    Lymphocytes, Absolute 1.82 0.70 - 3.10 10*3/mm3    Monocytes, Absolute 0.71 0.10 - 0.90 10*3/mm3    Eosinophils, Absolute 0.34 0.00 - 0.40 10*3/mm3    Basophils, Absolute 0.05 0.00 - 0.20 10*3/mm3    Immature Grans, Absolute 0.01 0.00 - 0.05 10*3/mm3    nRBC 0.0 0.0 - 0.2 /100 WBC   Green Top (Gel)   Result Value Ref Range    Extra Tube Hold for add-ons.    Lavender Top   Result Value Ref Range    Extra Tube hold for add-on    Gold Top - SST   Result Value Ref Range    Extra Tube Hold for add-ons.    Light Blue Top   Result Value Ref Range    Extra Tube  Hold for add-ons.              The following portions of the patient's history were reviewed and updated as appropriate: allergies, current medications, past family history, past medical history, past social history, past surgical history, and problem list.    Review of Systems   Constitutional:  Negative for activity change, chills and fever.   Respiratory:  Negative for cough.    Cardiovascular:  Negative for chest pain.   Psychiatric/Behavioral:  Negative for dysphoric mood.        Objective             Physical Exam  Constitutional:       General: He is not in acute distress.     Appearance: He is well-developed.   Cardiovascular:      Rate and Rhythm: Normal rate and regular rhythm.   Pulmonary:      Effort: Pulmonary effort is normal.      Breath sounds: Normal breath sounds.   Musculoskeletal:        Legs:       Comments: Some superficial varicosities noted, which are tender to palpation   Neurological:      Mental Status: He is alert and oriented to person, place, and time.   Psychiatric:         Behavior: Behavior normal.         Thought Content: Thought content normal.     Recent labs completed at a different office independently reviewed by me at today's visit.        Diagnoses and all orders for this visit:    1. Benign essential hypertension (Primary)  -     losartan (COZAAR) 50 MG tablet; Take 1 tablet by mouth Daily.  Dispense: 90 tablet; Refill: 1  -     amLODIPine (NORVASC) 5 MG tablet; Take 1 tablet by mouth Daily.  Dispense: 90 tablet; Refill: 1  -     atorvastatin (LIPITOR) 10 MG tablet; Take 1 tablet by mouth Daily.  Dispense: 90 tablet; Refill: 1    2. Multiple subsegmental pulmonary emboli without acute cor pulmonale  -     apixaban (Eliquis) 5 MG tablet tablet; Take 1 tablet by mouth Every 12 (Twelve) Hours.  Dispense: 180 tablet; Refill: 1    3. Mixed hyperlipidemia  -     atorvastatin (LIPITOR) 10 MG tablet; Take 1 tablet by mouth Daily.  Dispense: 90 tablet; Refill: 1  -     Lipid  Panel    4. Chronic fatigue  -     T4, Free  -     Testosterone, Free, Total  -     TSH    5. Pain of right lower extremity  -     Duplex Venous Lower Extremity - Right CAR; Future    6. History of DVT (deep vein thrombosis)  -     Duplex Venous Lower Extremity - Right CAR; Future    Discussed wearing compression during his workday.

## 2024-04-03 ENCOUNTER — LAB (OUTPATIENT)
Dept: LAB | Facility: HOSPITAL | Age: 64
End: 2024-04-03
Payer: COMMERCIAL

## 2024-04-03 ENCOUNTER — TELEPHONE (OUTPATIENT)
Dept: FAMILY MEDICINE CLINIC | Facility: CLINIC | Age: 64
End: 2024-04-03

## 2024-04-03 ENCOUNTER — OFFICE VISIT (OUTPATIENT)
Dept: FAMILY MEDICINE CLINIC | Facility: CLINIC | Age: 64
End: 2024-04-03
Payer: COMMERCIAL

## 2024-04-03 ENCOUNTER — HOSPITAL ENCOUNTER (OUTPATIENT)
Dept: CARDIOLOGY | Facility: HOSPITAL | Age: 64
Discharge: HOME | End: 2024-04-03
Payer: COMMERCIAL

## 2024-04-03 VITALS
RESPIRATION RATE: 16 BRPM | HEIGHT: 72 IN | WEIGHT: 190 LBS | DIASTOLIC BLOOD PRESSURE: 87 MMHG | TEMPERATURE: 98.1 F | HEART RATE: 70 BPM | SYSTOLIC BLOOD PRESSURE: 125 MMHG | BODY MASS INDEX: 25.73 KG/M2 | OXYGEN SATURATION: 98 %

## 2024-04-03 DIAGNOSIS — M79.604 PAIN OF RIGHT LOWER EXTREMITY: ICD-10-CM

## 2024-04-03 DIAGNOSIS — E78.2 MIXED HYPERLIPIDEMIA: Chronic | ICD-10-CM

## 2024-04-03 DIAGNOSIS — I26.94 MULTIPLE SUBSEGMENTAL PULMONARY EMBOLI WITHOUT ACUTE COR PULMONALE: Chronic | ICD-10-CM

## 2024-04-03 DIAGNOSIS — Z86.718 HISTORY OF DVT (DEEP VEIN THROMBOSIS): ICD-10-CM

## 2024-04-03 DIAGNOSIS — R53.82 CHRONIC FATIGUE: ICD-10-CM

## 2024-04-03 DIAGNOSIS — I10 BENIGN ESSENTIAL HYPERTENSION: Primary | Chronic | ICD-10-CM

## 2024-04-03 LAB
BH CV LOWER VASCULAR LEFT COMMON FEMORAL AUGMENT: NORMAL
BH CV LOWER VASCULAR LEFT COMMON FEMORAL COMPETENT: NORMAL
BH CV LOWER VASCULAR LEFT COMMON FEMORAL COMPRESS: NORMAL
BH CV LOWER VASCULAR LEFT COMMON FEMORAL PHASIC: NORMAL
BH CV LOWER VASCULAR LEFT COMMON FEMORAL SPONT: NORMAL
BH CV LOWER VASCULAR RIGHT COMMON FEMORAL AUGMENT: NORMAL
BH CV LOWER VASCULAR RIGHT COMMON FEMORAL COMPETENT: NORMAL
BH CV LOWER VASCULAR RIGHT COMMON FEMORAL COMPRESS: NORMAL
BH CV LOWER VASCULAR RIGHT COMMON FEMORAL PHASIC: NORMAL
BH CV LOWER VASCULAR RIGHT COMMON FEMORAL SPONT: NORMAL
BH CV LOWER VASCULAR RIGHT DISTAL FEMORAL COMPRESS: NORMAL
BH CV LOWER VASCULAR RIGHT GASTRONEMIUS COMPRESS: NORMAL
BH CV LOWER VASCULAR RIGHT GREATER SAPH AK COMPRESS: NORMAL
BH CV LOWER VASCULAR RIGHT GREATER SAPH BK COMPRESS: NORMAL
BH CV LOWER VASCULAR RIGHT LESSER SAPH COMPRESS: NORMAL
BH CV LOWER VASCULAR RIGHT MID FEMORAL AUGMENT: NORMAL
BH CV LOWER VASCULAR RIGHT MID FEMORAL COMPETENT: NORMAL
BH CV LOWER VASCULAR RIGHT MID FEMORAL COMPRESS: NORMAL
BH CV LOWER VASCULAR RIGHT MID FEMORAL PHASIC: NORMAL
BH CV LOWER VASCULAR RIGHT MID FEMORAL SPONT: NORMAL
BH CV LOWER VASCULAR RIGHT PERONEAL COMPRESS: NORMAL
BH CV LOWER VASCULAR RIGHT POPLITEAL AUGMENT: NORMAL
BH CV LOWER VASCULAR RIGHT POPLITEAL COMPETENT: NORMAL
BH CV LOWER VASCULAR RIGHT POPLITEAL COMPRESS: NORMAL
BH CV LOWER VASCULAR RIGHT POPLITEAL PHASIC: NORMAL
BH CV LOWER VASCULAR RIGHT POPLITEAL SPONT: NORMAL
BH CV LOWER VASCULAR RIGHT POSTERIOR TIBIAL COMPRESS: NORMAL
BH CV LOWER VASCULAR RIGHT PROFUNDA FEMORAL COMPRESS: NORMAL
BH CV LOWER VASCULAR RIGHT PROXIMAL FEMORAL COMPRESS: NORMAL
BH CV LOWER VASCULAR RIGHT SAPHENOFEMORAL JUNCTION COMPRESS: NORMAL
CHOLEST SERPL-MCNC: 152 MG/DL (ref 0–200)
HDLC SERPL-MCNC: 73 MG/DL (ref 40–60)
LDLC SERPL CALC-MCNC: 69 MG/DL (ref 0–100)
LDLC/HDLC SERPL: 0.96 {RATIO}
T4 FREE SERPL-MCNC: 1.24 NG/DL (ref 0.93–1.7)
TRIGL SERPL-MCNC: 43 MG/DL (ref 0–150)
TSH SERPL DL<=0.05 MIU/L-ACNC: 0.99 UIU/ML (ref 0.27–4.2)
VLDLC SERPL-MCNC: 10 MG/DL (ref 5–40)

## 2024-04-03 PROCEDURE — 93971 EXTREMITY STUDY: CPT

## 2024-04-03 PROCEDURE — 84443 ASSAY THYROID STIM HORMONE: CPT | Performed by: FAMILY MEDICINE

## 2024-04-03 PROCEDURE — 36415 COLL VENOUS BLD VENIPUNCTURE: CPT | Performed by: FAMILY MEDICINE

## 2024-04-03 PROCEDURE — 84403 ASSAY OF TOTAL TESTOSTERONE: CPT | Performed by: FAMILY MEDICINE

## 2024-04-03 PROCEDURE — 84402 ASSAY OF FREE TESTOSTERONE: CPT | Performed by: FAMILY MEDICINE

## 2024-04-03 PROCEDURE — 84439 ASSAY OF FREE THYROXINE: CPT | Performed by: FAMILY MEDICINE

## 2024-04-03 PROCEDURE — 99214 OFFICE O/P EST MOD 30 MIN: CPT | Performed by: FAMILY MEDICINE

## 2024-04-03 PROCEDURE — 80061 LIPID PANEL: CPT | Performed by: FAMILY MEDICINE

## 2024-04-03 RX ORDER — BACLOFEN 10 MG/1
10 TABLET ORAL
COMMUNITY

## 2024-04-03 RX ORDER — LOSARTAN POTASSIUM 50 MG/1
50 TABLET ORAL DAILY
Qty: 90 TABLET | Refills: 1 | Status: SHIPPED | OUTPATIENT
Start: 2024-04-03

## 2024-04-03 RX ORDER — AMLODIPINE BESYLATE 5 MG/1
5 TABLET ORAL DAILY
Qty: 90 TABLET | Refills: 1 | Status: SHIPPED | OUTPATIENT
Start: 2024-04-03

## 2024-04-03 RX ORDER — ATORVASTATIN CALCIUM 10 MG/1
10 TABLET, FILM COATED ORAL DAILY
Qty: 90 TABLET | Refills: 1 | Status: SHIPPED | OUTPATIENT
Start: 2024-04-03

## 2024-04-03 NOTE — TELEPHONE ENCOUNTER
PT SCHEDULED FOR VENOUS DOPPLER OF RIGHT LOWER EXTREMITY  4/3/2024 AT 12OO PM   PT ADVISED . TEST DONE MAIN HOSPITAL   ( SPOKE WITH RUBEN )  NATALIAAN NOTIFIED OF PRIOR AUTH NEEDED . STAT TEST . TOLD TO CALL WITH TEST RESULTS

## 2024-04-06 LAB
TESTOST FREE SERPL-MCNC: 12.9 PG/ML (ref 6.6–18.1)
TESTOST SERPL-MCNC: 648 NG/DL (ref 264–916)

## 2024-06-12 ENCOUNTER — HOSPITAL ENCOUNTER (EMERGENCY)
Facility: HOSPITAL | Age: 64
Discharge: HOME | End: 2024-06-12
Attending: EMERGENCY MEDICINE
Payer: COMMERCIAL

## 2024-06-12 ENCOUNTER — APPOINTMENT (OUTPATIENT)
Dept: GENERAL RADIOLOGY | Facility: HOSPITAL | Age: 64
End: 2024-06-12
Payer: COMMERCIAL

## 2024-06-12 VITALS
TEMPERATURE: 99.1 F | SYSTOLIC BLOOD PRESSURE: 124 MMHG | OXYGEN SATURATION: 98 % | HEART RATE: 67 BPM | RESPIRATION RATE: 18 BRPM | DIASTOLIC BLOOD PRESSURE: 78 MMHG

## 2024-06-12 DIAGNOSIS — R07.89 ATYPICAL CHEST PAIN: Primary | ICD-10-CM

## 2024-06-12 LAB
ALBUMIN SERPL-MCNC: 4.2 G/DL (ref 3.5–5.2)
ALBUMIN/GLOB SERPL: 1.9 G/DL
ALP SERPL-CCNC: 72 U/L (ref 39–117)
ALT SERPL W P-5'-P-CCNC: 19 U/L (ref 1–41)
ANION GAP SERPL CALCULATED.3IONS-SCNC: 11.1 MMOL/L (ref 5–15)
AST SERPL-CCNC: 23 U/L (ref 1–40)
BASOPHILS # BLD AUTO: 0.07 10*3/MM3 (ref 0–0.2)
BASOPHILS NFR BLD AUTO: 1.4 % (ref 0–1.5)
BILIRUB SERPL-MCNC: 1.2 MG/DL (ref 0–1.2)
BUN SERPL-MCNC: 11 MG/DL (ref 8–23)
BUN/CREAT SERPL: 7.8 (ref 7–25)
CALCIUM SPEC-SCNC: 9 MG/DL (ref 8.6–10.5)
CHLORIDE SERPL-SCNC: 105 MMOL/L (ref 98–107)
CO2 SERPL-SCNC: 23.9 MMOL/L (ref 22–29)
CREAT SERPL-MCNC: 1.41 MG/DL (ref 0.76–1.27)
DEPRECATED RDW RBC AUTO: 45 FL (ref 37–54)
EGFRCR SERPLBLD CKD-EPI 2021: 56.7 ML/MIN/1.73
EOSINOPHIL # BLD AUTO: 0.23 10*3/MM3 (ref 0–0.4)
EOSINOPHIL NFR BLD AUTO: 4.4 % (ref 0.3–6.2)
ERYTHROCYTE [DISTWIDTH] IN BLOOD BY AUTOMATED COUNT: 14.1 % (ref 12.3–15.4)
GLOBULIN UR ELPH-MCNC: 2.2 GM/DL
GLUCOSE SERPL-MCNC: 99 MG/DL (ref 65–99)
HCT VFR BLD AUTO: 42.3 % (ref 37.5–51)
HGB BLD-MCNC: 14 G/DL (ref 13–17.7)
HOLD SPECIMEN: NORMAL
HOLD SPECIMEN: NORMAL
IMM GRANULOCYTES # BLD AUTO: 0.01 10*3/MM3 (ref 0–0.05)
IMM GRANULOCYTES NFR BLD AUTO: 0.2 % (ref 0–0.5)
LYMPHOCYTES # BLD AUTO: 1.65 10*3/MM3 (ref 0.7–3.1)
LYMPHOCYTES NFR BLD AUTO: 31.9 % (ref 19.6–45.3)
MCH RBC QN AUTO: 29 PG (ref 26.6–33)
MCHC RBC AUTO-ENTMCNC: 33.1 G/DL (ref 31.5–35.7)
MCV RBC AUTO: 87.8 FL (ref 79–97)
MONOCYTES # BLD AUTO: 0.59 10*3/MM3 (ref 0.1–0.9)
MONOCYTES NFR BLD AUTO: 11.4 % (ref 5–12)
NEUTROPHILS NFR BLD AUTO: 2.62 10*3/MM3 (ref 1.7–7)
NEUTROPHILS NFR BLD AUTO: 50.7 % (ref 42.7–76)
NRBC BLD AUTO-RTO: 0 /100 WBC (ref 0–0.2)
PLATELET # BLD AUTO: 275 10*3/MM3 (ref 140–450)
PMV BLD AUTO: 8.8 FL (ref 6–12)
POTASSIUM SERPL-SCNC: 3.7 MMOL/L (ref 3.5–5.2)
PROT SERPL-MCNC: 6.4 G/DL (ref 6–8.5)
QT INTERVAL: 359 MS
QTC INTERVAL: 393 MS
RBC # BLD AUTO: 4.82 10*6/MM3 (ref 4.14–5.8)
SODIUM SERPL-SCNC: 140 MMOL/L (ref 136–145)
TROPONIN T SERPL HS-MCNC: 10 NG/L
WBC NRBC COR # BLD AUTO: 5.17 10*3/MM3 (ref 3.4–10.8)
WHOLE BLOOD HOLD COAG: NORMAL
WHOLE BLOOD HOLD SPECIMEN: NORMAL

## 2024-06-12 PROCEDURE — 93010 ELECTROCARDIOGRAM REPORT: CPT | Performed by: INTERNAL MEDICINE

## 2024-06-12 PROCEDURE — 85025 COMPLETE CBC W/AUTO DIFF WBC: CPT

## 2024-06-12 PROCEDURE — 93005 ELECTROCARDIOGRAM TRACING: CPT

## 2024-06-12 PROCEDURE — 99284 EMERGENCY DEPT VISIT MOD MDM: CPT

## 2024-06-12 PROCEDURE — 84484 ASSAY OF TROPONIN QUANT: CPT

## 2024-06-12 PROCEDURE — 71045 X-RAY EXAM CHEST 1 VIEW: CPT

## 2024-06-12 PROCEDURE — 80053 COMPREHEN METABOLIC PANEL: CPT

## 2024-06-12 RX ORDER — SODIUM CHLORIDE 0.9 % (FLUSH) 0.9 %
10 SYRINGE (ML) INJECTION AS NEEDED
Status: DISCONTINUED | OUTPATIENT
Start: 2024-06-12 | End: 2024-06-12 | Stop reason: HOSPADM

## 2024-06-12 RX ORDER — ASPIRIN 325 MG
325 TABLET ORAL ONCE
Status: DISCONTINUED | OUTPATIENT
Start: 2024-06-12 | End: 2024-06-12

## 2024-06-12 NOTE — ED PROVIDER NOTES
" EMERGENCY DEPARTMENT ENCOUNTER    Room Number:  26/26  PCP: Galileo Urbina MD    HPI:  Chief Complaint: Chest pain  A complete HPI/ROS/PMH/PSH/SH/FH are unobtainable due to: None  Context: Kenrick Webster is a 63 y.o. male who presents to the ED c/o acute chest pain.  Onset around 12:30 PM.  He has had 2 episodes each lasting for a second or 2.  It feels like a \"sting\" that does not radiate.  It seems to be worse with touch to the left chest and between the nipple and the sternum.  Mild dyspnea.  He reports good compliance with his Eliquis.  No fever or cough.  He suspects that the chest discomfort is due to drinking a lot of coffee this week which she is intentionally trying to do in order to try different coffee shops around town on that he is on summer vacation from [facility].  He has been drinking a lot of coffee this week with the goal of trying different coffee shops.        PAST MEDICAL HISTORY  Active Ambulatory Problems     Diagnosis Date Noted    DVT (deep venous thrombosis)     Benign essential hypertension     Renal insufficiency     Vitamin D deficiency     Hyperlipidemia     Gastroesophageal reflux disease without esophagitis 11/25/2015    Moderate persistent asthma without complication 12/19/2016    Other chest pain 10/20/2017    Mass 10/20/2017    Stress at home 02/27/2018    Personal history of colonic polyps 01/24/2023    Multiple subsegmental pulmonary emboli without acute cor pulmonale 04/25/2023     Resolved Ambulatory Problems     Diagnosis Date Noted    No Resolved Ambulatory Problems     Past Medical History:   Diagnosis Date    Anxiety and depression     Back pain     Bursitis     Chest pain     Colon polyp     DUNCAN (dyspnea on exertion)     Elevated cholesterol     Fatigue     H/O complete eye exam scheduled         PAST SURGICAL HISTORY  Past Surgical History:   Procedure Laterality Date    APPENDECTOMY      COLONOSCOPY      ENDOSCOPY      OTHER SURGICAL HISTORY      gallbladder testing or " surgery    ROTATOR CUFF REPAIR           FAMILY HISTORY  Family History   Problem Relation Age of Onset    Heart disease Father     Stroke Sister     Hypertension Sister     Kidney disease Sister     Diabetes Brother          SOCIAL HISTORY  Social History     Socioeconomic History    Marital status: Single   Tobacco Use    Smoking status: Never    Smokeless tobacco: Never   Vaping Use    Vaping status: Never Used   Substance and Sexual Activity    Alcohol use: No    Drug use: No    Sexual activity: Defer         ALLERGIES  Patient has no known allergies.        REVIEW OF SYSTEMS  Review of Systems     Included in HPI  All systems reviewed and negative except for those discussed in HPI.       PHYSICAL EXAM  ED Triage Vitals   Temp Heart Rate Resp BP SpO2   06/12/24 1530 06/12/24 1530 06/12/24 1530 06/12/24 1534 06/12/24 1530   99.1 °F (37.3 °C) 89 18 133/75 98 %      Temp src Heart Rate Source Patient Position BP Location FiO2 (%)   -- 06/12/24 1530 06/12/24 1534 06/12/24 1534 --    Monitor Lying Right arm        Physical Exam      GENERAL: no acute distress  HENT: nares patent  EYES: no scleral icterus  CV: regular rhythm, normal rate, 2+ radial pulses bilaterally  RESPIRATORY: normal effort, clear to auscultation bilaterally  ABDOMEN: soft, nontender  MUSCULOSKELETAL: no deformity, no lower extremity edema or tenderness, reproducible chest wall tenderness to the left anterior chest wall  NEURO: alert, moves all extremities, follows commands  PSYCH:  calm, cooperative  SKIN: warm, dry    Vital signs and nursing notes reviewed.          LAB RESULTS  Recent Results (from the past 24 hour(s))   ECG 12 Lead ED Triage Standing Order; Chest Pain    Collection Time: 06/12/24  3:33 PM   Result Value Ref Range    QT Interval 359 ms    QTC Interval 393 ms   Comprehensive Metabolic Panel    Collection Time: 06/12/24  3:48 PM    Specimen: Blood   Result Value Ref Range    Glucose 99 65 - 99 mg/dL    BUN 11 8 - 23 mg/dL     Creatinine 1.41 (H) 0.76 - 1.27 mg/dL    Sodium 140 136 - 145 mmol/L    Potassium 3.7 3.5 - 5.2 mmol/L    Chloride 105 98 - 107 mmol/L    CO2 23.9 22.0 - 29.0 mmol/L    Calcium 9.0 8.6 - 10.5 mg/dL    Total Protein 6.4 6.0 - 8.5 g/dL    Albumin 4.2 3.5 - 5.2 g/dL    ALT (SGPT) 19 1 - 41 U/L    AST (SGOT) 23 1 - 40 U/L    Alkaline Phosphatase 72 39 - 117 U/L    Total Bilirubin 1.2 0.0 - 1.2 mg/dL    Globulin 2.2 gm/dL    A/G Ratio 1.9 g/dL    BUN/Creatinine Ratio 7.8 7.0 - 25.0    Anion Gap 11.1 5.0 - 15.0 mmol/L    eGFR 56.7 (L) >60.0 mL/min/1.73   High Sensitivity Troponin T    Collection Time: 06/12/24  3:48 PM    Specimen: Blood   Result Value Ref Range    HS Troponin T 10 <22 ng/L   Green Top (Gel)    Collection Time: 06/12/24  3:48 PM   Result Value Ref Range    Extra Tube Hold for add-ons.    Lavender Top    Collection Time: 06/12/24  3:48 PM   Result Value Ref Range    Extra Tube hold for add-on    Gold Top - SST    Collection Time: 06/12/24  3:48 PM   Result Value Ref Range    Extra Tube Hold for add-ons.    Light Blue Top    Collection Time: 06/12/24  3:48 PM   Result Value Ref Range    Extra Tube Hold for add-ons.    CBC Auto Differential    Collection Time: 06/12/24  3:48 PM    Specimen: Blood   Result Value Ref Range    WBC 5.17 3.40 - 10.80 10*3/mm3    RBC 4.82 4.14 - 5.80 10*6/mm3    Hemoglobin 14.0 13.0 - 17.7 g/dL    Hematocrit 42.3 37.5 - 51.0 %    MCV 87.8 79.0 - 97.0 fL    MCH 29.0 26.6 - 33.0 pg    MCHC 33.1 31.5 - 35.7 g/dL    RDW 14.1 12.3 - 15.4 %    RDW-SD 45.0 37.0 - 54.0 fl    MPV 8.8 6.0 - 12.0 fL    Platelets 275 140 - 450 10*3/mm3    Neutrophil % 50.7 42.7 - 76.0 %    Lymphocyte % 31.9 19.6 - 45.3 %    Monocyte % 11.4 5.0 - 12.0 %    Eosinophil % 4.4 0.3 - 6.2 %    Basophil % 1.4 0.0 - 1.5 %    Immature Grans % 0.2 0.0 - 0.5 %    Neutrophils, Absolute 2.62 1.70 - 7.00 10*3/mm3    Lymphocytes, Absolute 1.65 0.70 - 3.10 10*3/mm3    Monocytes, Absolute 0.59 0.10 - 0.90 10*3/mm3     Eosinophils, Absolute 0.23 0.00 - 0.40 10*3/mm3    Basophils, Absolute 0.07 0.00 - 0.20 10*3/mm3    Immature Grans, Absolute 0.01 0.00 - 0.05 10*3/mm3    nRBC 0.0 0.0 - 0.2 /100 WBC       Ordered the above labs and reviewed the results.        RADIOLOGY  XR Chest 1 View    Result Date: 6/12/2024  XR CHEST 1 VW-  HISTORY: Male who is 63 years-old, chest pain  TECHNIQUE: Frontal view of the chest  COMPARISON: 12/14/2023  FINDINGS: Heart, mediastinum and pulmonary vasculature are unremarkable. No focal pulmonary consolidation, pleural effusion, or pneumothorax. No acute osseous process.      No focal pulmonary consolidation. Follow-up as clinical indications persist.  This report was finalized on 6/12/2024 4:06 PM by Dr. Rigo Carrasquillo M.D on Workstation: [facility]       Ordered the above noted radiological studies. Reviewed by me in PACS.        MEDICATIONS GIVEN IN ER  Medications   sodium chloride 0.9 % flush 10 mL (has no administration in time range)         ORDERS PLACED DURING THIS VISIT:  Orders Placed This Encounter   Procedures    XR Chest 1 View    [facility] Draw    Comprehensive Metabolic Panel    High Sensitivity Troponin T    CBC Auto Differential    NPO Diet NPO Type: Strict NPO    Undress & Gown    Continuous Pulse Oximetry    Oxygen Therapy- Nasal Cannula; Titrate 1-6 LPM Per SpO2; 90 - 95%    ECG 12 Lead ED Triage Standing Order; Chest Pain    ECG 12 Lead ED Triage Standing Order; Chest Pain    Insert Peripheral IV    CBC & Differential    Green Top (Gel)    Lavender Top    Gold Top - SST    Light Blue Top         OUTPATIENT MEDICATION MANAGEMENT:  Current Facility-Administered Medications Ordered in Epic   Medication Dose Route Frequency Provider Last Rate Last Admin    sodium chloride 0.9 % flush 10 mL  10 mL Intravenous PRN Emergency, Triage Protocol, MD         Current Outpatient Medications Ordered in Epic   Medication Sig Dispense Refill    amLODIPine (NORVASC) 5 MG tablet Take 1 tablet by mouth  Daily. 90 tablet 1    apixaban (Eliquis) 5 MG tablet tablet Take 1 tablet by mouth Every 12 (Twelve) Hours. 180 tablet 1    atorvastatin (LIPITOR) 10 MG tablet Take 1 tablet by mouth Daily. 90 tablet 1    baclofen (LIORESAL) 10 MG tablet Take 1 tablet by mouth.      cetirizine (zyrTEC) 10 MG tablet Take 1 tablet by mouth Daily for 30 days. 30 tablet 0    cholecalciferol (VITAMIN D3) 1000 UNITS tablet Take 2 tablets by mouth Daily.      Cholecalciferol 50 MCG (2000 UT) tablet Take 1 tablet by mouth Daily.      EPINEPHrine (EPIPEN JR IJ) Inject  as directed As Needed.      EPINEPHrine (EPIPEN) 0.3 MG/0.3ML solution auto-injector injection INJECT 0.3 MILLILITER (0.3 MG) BY INTRAMUSCULAR ROUTE ONCE AS NEEDED FOR ANAPHYLAXIS      losartan (COZAAR) 50 MG tablet Take 1 tablet by mouth Daily. 90 tablet 1    montelukast (SINGULAIR) 10 MG tablet Take 1 tablet by mouth Every Night.      omeprazole (priLOSEC) 20 MG capsule Take 1 capsule by mouth Daily. 30 capsule 0    predniSONE (DELTASONE) 20 MG tablet Take 1 tablet by mouth 2 (Two) Times a Day. 10 tablet 0       PROCEDURES  Procedures          MEDICAL DECISION MAKING, PROGRESS, and CONSULTS    Discussion below represents my analysis of pertinent findings related to patient's condition, differential diagnosis, treatment plan and final disposition.          Differential diagnosis:    Differential diagnosis includes but not limited to acute coronary syndrome, pulmonary embolism, thoracic aortic dissection, pneumonia, pneumothorax, musculoskeletal pain, GERD or esophageal spasm, anxiety, myocarditis/pericarditis, esophageal rupture, pancreatitis.     History: 0  EC  Age: 3  Risk factors: 1    HEAR score: 2    Pain is not cardiac in nature.  Do not believe that a second troponin is needed as he had 2 episodes each lasting for about 1 to 2 seconds.    HEART Score: 2        After initial evaluation, I considered the need for admission due to the possibility of the thorax given  the stinging pain that he describes.    Thankfully, chest x-ray shows no evidence of pneumothorax.  He reports good compliance with anticoagulation I do not think he has a PE.  He has symmetric bilateral radial pulses.  His description of his symptoms are not consistent with dissection either.          Independent interpretation of labs, radiology studies, and discussions with consultants:  ED Course as of 06/12/24 1632   Wed Jun 12, 2024   1558 EKG independently interpreted by myself.  Time 3:33 PM.  Sinus rhythm.  Heart rate 72.  Normal intervals and axis.  No acute ST abnormality. [TD]   1559 Chest x-ray independently interpreted by myself.  No evidence of pneumonia.  No mediastinal widening. [TD]   1607 WBC: 5.17 [TD]   1607 Hemoglobin: 14.0 [TD]   1631 HS Troponin T: 10 [TD]      ED Course User Index  [TD] Oswaldo Gomez II, MD               DIAGNOSIS  Final diagnoses:   Atypical chest pain         DISPOSITION  DISCHARGE    FOLLOW-UP  [facility] EMERGENCY DEPARTMENT  [address] [address] [phone]  Go to   If symptoms worsen    Galileo Urbina MD  [address] [address]  [facility] [place] [address]  [phone]    Schedule an appointment as soon as possible for a visit in 1 week  As needed         Medication List      No changes were made to your prescriptions during this visit.             Latest Documented Vital Signs:  As of 16:33 EDT  BP- 133/75 HR- 89 Temp- 99.1 °F (37.3 °C) O2 sat- 98%      --    Please note that portions of this were completed with a voice recognition program.       Note Disclaimer: At [facility], we believe that sharing information builds trust and better relationships. You are receiving this note because you are receiving care at [facility] or recently visited. It is possible you will see health information before a provider has talked with you about it. This kind of information can be easy to misunderstand. To help you fully  understand what it means for your health, we urge you to discuss this note with your provider.         Oswaldo Gomez II, MD  06/12/24 [id number]

## 2024-06-14 ENCOUNTER — TELEPHONE (OUTPATIENT)
Dept: CARDIOLOGY | Facility: CLINIC | Age: 64
End: 2024-06-14

## 2024-06-14 NOTE — TELEPHONE ENCOUNTER
Caller: Kenrick Webster    Relationship to patient: Self    Best call back number: [phone]    Type of visit: FOLLOW UP     Additional notes:PT SAW HIS ALLERGIST DR CAROLE EWING WITH [facility] AND WAS RECOMMENDED HIM SEE HIS CARDIOLOGIST. PLEASE ADVISE WHEN TO GET PT IN AND CALL BACK TO SCHEDULE AN APPT. LAST VISIT WITH DR PEREZ WAS 11.2.23

## 2024-06-30 ENCOUNTER — HOSPITAL ENCOUNTER (EMERGENCY)
Facility: HOSPITAL | Age: 64
Discharge: HOME | End: 2024-06-30
Attending: EMERGENCY MEDICINE | Admitting: EMERGENCY MEDICINE
Payer: COMMERCIAL

## 2024-06-30 ENCOUNTER — APPOINTMENT (OUTPATIENT)
Dept: GENERAL RADIOLOGY | Facility: HOSPITAL | Age: 64
End: 2024-06-30
Payer: COMMERCIAL

## 2024-06-30 ENCOUNTER — APPOINTMENT (OUTPATIENT)
Dept: CT IMAGING | Facility: HOSPITAL | Age: 64
End: 2024-06-30
Payer: COMMERCIAL

## 2024-06-30 VITALS
DIASTOLIC BLOOD PRESSURE: 92 MMHG | HEIGHT: 72 IN | RESPIRATION RATE: 16 BRPM | OXYGEN SATURATION: 98 % | BODY MASS INDEX: 25.73 KG/M2 | WEIGHT: 190 LBS | SYSTOLIC BLOOD PRESSURE: 138 MMHG | TEMPERATURE: 97.9 F | HEART RATE: 58 BPM

## 2024-06-30 DIAGNOSIS — J06.9 VIRAL URI WITH COUGH: Primary | ICD-10-CM

## 2024-06-30 DIAGNOSIS — R51.9 NONINTRACTABLE EPISODIC HEADACHE, UNSPECIFIED HEADACHE TYPE: ICD-10-CM

## 2024-06-30 PROCEDURE — 70450 CT HEAD/BRAIN W/O DYE: CPT

## 2024-06-30 PROCEDURE — 71046 X-RAY EXAM CHEST 2 VIEWS: CPT

## 2024-06-30 PROCEDURE — 99284 EMERGENCY DEPT VISIT MOD MDM: CPT

## 2024-06-30 PROCEDURE — 0202U NFCT DS 22 TRGT SARS-COV-2: CPT | Performed by: EMERGENCY MEDICINE

## 2024-06-30 NOTE — DISCHARGE INSTRUCTIONS
Check with your primary care doctor in 2 days.  You can take Tylenol or Excedrin Migraine if needed for recurrent headache.  Caffeine sleep can also help.  Return to the ER for shortness of breath, severe headache, vision changes, vomiting, numbness tingling weakness, any concerns

## 2024-06-30 NOTE — ED PROVIDER NOTES
MD ATTESTATION NOTE    The MALCOM and I have discussed this patient's history, physical exam, MDM, and treatment plan.  I have reviewed the documentation and personally had a face to face interaction with the patient. I affirm the documentation and agree with the treatment and plan.  The attached note describes my personal findings.      I provided a substantive portion of the medical decision making.        Brief HPI: Patient presents with complaint of headache for the last 3 days.  It started as a left temporal headache and then came more diffuse.  He is also had associated cough productive of some white sputum.  He denies fever or chills.  He states his headache is worse with coughing.  Currently his headache is mild but it is still there.  He denies vision disturbance, dizziness, focal weakness or sensory changes.  He states he is also been feeling fatigued for several months and he seen his PCP about the symptoms.    PHYSICAL EXAM  ED Triage Vitals   Temp Heart Rate Resp BP SpO2   06/30/24 0926 06/30/24 0926 06/30/24 0926 06/30/24 0928 06/30/24 0926   97.9 °F (36.6 °C) 92 16 135/93 99 %      Temp src Heart Rate Source Patient Position BP Location FiO2 (%)   -- -- -- -- --                GENERAL: Awake and alert, well-appearing, no acute distress  HENT: nares patent  EYES: no scleral icterus, EOMI  CV: regular rhythm, normal rate  RESPIRATORY: normal effort  MUSCULOSKELETAL: no deformity  NEURO: alert, moves all extremities, follows commands, cranial nerves II through XII grossly intact, speech fluent and clear  PSYCH:  calm, cooperative  SKIN: warm, dry    Vital signs and nursing notes reviewed.        Medical Decision Making:  ED Course as of 06/30/24 1149   Sun Jun 30, 2024   1112 My independent interpretation of the chest x-ray is no cardiomegaly or acute infiltrate [KA]   1134 COVID19: Not Detected [JR]      ED Course User Index  [JR] John Vu MD  [KA] Zandra Ely PA-C       Patient presents  with complaint of headache for the last 3 days, no focal neurologic deficits, headache is worse with coughing.  I reviewed previous CT brain from September 2023 that demonstrated no acute intracranial hemorrhage, hydrocephalus, mass effect.  I discussed plan to obtain repeat head CT today, and anticipate discharge home if this is reassuring with continued PCP evaluation.         John Vu MD  06/30/24 [id number]

## 2024-06-30 NOTE — ED PROVIDER NOTES
EMERGENCY DEPARTMENT ENCOUNTER  Room Number:  06/06  PCP: Galileo Urbina MD  Independent Historians: Patient      HPI:  Chief Complaint: had concerns including Cough and Headache.       A complete HPI/ROS/PMH/PSH/SH/FH are unobtainable due to: None    Chronic or social conditions impacting patient care (Social Determinants of Health): None      Context: Kenrick Webster is a 63 y.o. male with a medical history of hypertension, hyperlipidemia, DVT who presents to the ED c/o acute cough and chest congestion.  He states that started 5 days ago.  He said limited rhinorrhea, no sore throat, no fevers or ear pain vomiting or diarrhea.  He also states he has had an intermittent headache over the course of that time.  It is present but very mild now, at times it is worse.  He states initially started on the left side, then was present only on the right side, at times it is generalized.  It worsens with cough, denies vision disturbance, photophobia, phonophobia, difficulty walking numbness weakness or speaking difficulty.  He states he does have a remote history of migraines but when he started allergy shots they eventually resolved.      Review of prior external notes (non-ED) -and- Review of prior external test results outside of this encounter:  CT head performed 9/2/2023 due to dizziness and double vision showed no acute intracranial findings        PAST MEDICAL HISTORY  Active Ambulatory Problems     Diagnosis Date Noted    DVT (deep venous thrombosis)     Benign essential hypertension     Renal insufficiency     Vitamin D deficiency     Hyperlipidemia     Gastroesophageal reflux disease without esophagitis 11/25/2015    Moderate persistent asthma without complication 12/19/2016    Other chest pain 10/20/2017    Mass 10/20/2017    Stress at home 02/27/2018    Personal history of colonic polyps 01/24/2023    Multiple subsegmental pulmonary emboli without acute cor pulmonale 04/25/2023     Resolved Ambulatory Problems      Diagnosis Date Noted    No Resolved Ambulatory Problems     Past Medical History:   Diagnosis Date    Anxiety and depression     Back pain     Bursitis     Chest pain     Colon polyp     DUNCAN (dyspnea on exertion)     Elevated cholesterol     Fatigue     H/O complete eye exam scheduled         PAST SURGICAL HISTORY  Past Surgical History:   Procedure Laterality Date    APPENDECTOMY      COLONOSCOPY      ENDOSCOPY      OTHER SURGICAL HISTORY      gallbladder testing or surgery    ROTATOR CUFF REPAIR           FAMILY HISTORY  Family History   Problem Relation Age of Onset    Heart disease Father     Stroke Sister     Hypertension Sister     Kidney disease Sister     Diabetes Brother          SOCIAL HISTORY  Social History     Socioeconomic History    Marital status: Single   Tobacco Use    Smoking status: Never    Smokeless tobacco: Never   Vaping Use    Vaping status: Never Used   Substance and Sexual Activity    Alcohol use: No    Drug use: No    Sexual activity: Defer         ALLERGIES  Patient has no known allergies.      REVIEW OF SYSTEMS  Review of Systems  Included in HPI  All systems reviewed and negative except for those discussed in HPI.      PHYSICAL EXAM    I have reviewed the triage vital signs and nursing notes.    ED Triage Vitals   Temp Heart Rate Resp BP SpO2   06/30/24 0926 06/30/24 0926 06/30/24 0926 06/30/24 0928 06/30/24 0926   97.9 °F (36.6 °C) 92 16 135/93 99 %      Temp src Heart Rate Source Patient Position BP Location FiO2 (%)   -- -- -- -- --              Physical Exam  GENERAL: alert, no acute distress, well-appearing, conversational  SKIN: Warm, dry  HENT: Normocephalic, atraumatic  EYES: no scleral icterus  CV: regular rhythm, regular rate  RESPIRATORY: normal effort, lungs clear  ABDOMEN: nondistended soft nontender  MUSCULOSKELETAL: no deformity  NEURO: Neuro: Alert and oriented x3, extraocular motion intact, pupils are equal and round reactive to light, cranial nerves II through XII  are grossly intact, normal speech, moves all extremities well, 5 out of 5 strength all 4 extremities, sensation intact light touch all 4 extremities, no ataxia.              LAB RESULTS  Recent Results (from the past 24 hour(s))   Respiratory Panel PCR w/COVID-19(SARS-CoV-2) [facility]/JOSELYN/DIGNA/PAD/[facility] In-House, NP Swab in UTM/VTM, 2 HR TAT - Swab, Nasopharynx    Collection Time: 06/30/24  9:00 AM    Specimen: Nasopharynx; Swab   Result Value Ref Range    ADENOVIRUS, PCR Not Detected Not Detected    Coronavirus 229E Not Detected Not Detected    Coronavirus HKU1 Not Detected Not Detected    Coronavirus NL63 Not Detected Not Detected    Coronavirus OC43 Not Detected Not Detected    COVID19 Not Detected Not Detected - Ref. Range    Human Metapneumovirus Not Detected Not Detected    Human Rhinovirus/Enterovirus Not Detected Not Detected    Influenza A PCR Not Detected Not Detected    Influenza B PCR Not Detected Not Detected    Parainfluenza Virus 1 Not Detected Not Detected    Parainfluenza Virus 2 Not Detected Not Detected    Parainfluenza Virus 3 Not Detected Not Detected    Parainfluenza Virus 4 Not Detected Not Detected    RSV, PCR Not Detected Not Detected    Bordetella pertussis pcr Not Detected Not Detected    Bordetella parapertussis PCR Not Detected Not Detected    Chlamydophila pneumoniae PCR Not Detected Not Detected    Mycoplasma pneumo by PCR Not Detected Not Detected         RADIOLOGY  CT Head Without Contrast    Result Date: 6/30/2024  EXAM:  CT HEAD WO CONTRAST-  HISTORY: Headache for 3 days, worse with coughing.  COMPARISON: CT head 9/2/2023  TECHNIQUE: Noncontrast images of the brain were obtained. Reformatted images were reviewed. Radiation dose reduction techniques were utilized, including automated exposure control and exposure modulation based on body size.  FINDINGS:   The ventricles and sulci are within normal limits.  No acute intracranial hemorrhage or pathologic extra-axial collection is  identified.  There is no midline shift or mass effect.  The basal cisterns are patent.  The grey-white matter differentiation is maintained.       No acute intracranial abnormality. Consider further evaluation with MRI, as clinically directed.  This report was finalized on 6/30/2024 1:18 PM by Dr. Lolita Knapp M.D on Workstation: [id number]      XR Chest 2 View    Result Date: 6/30/2024  XR CHEST 2 VW-  HISTORY: Productive cough, headache, dizziness.  COMPARISON: Chest radiograph 6/12/2024  FINDINGS:  2 views of the chest were obtained.  Support Devices:  None. Cardiac Silhouette/Mediastinum/Mable:  The cardiac, mediastinal, and hilar contours are not significantly changed. Lungs/Pleural Spaces:  The lungs and pleural spaces are clear. Chest Wall/Diaphragm/Upper Abdomen: There are metallic clips in the upper abdomen.   CONCLUSION(S):   1.  No focal consolidation or effusion.  This report was finalized on 6/30/2024 11:15 AM by Dr. Lolita Knapp M.D on Workstation: [id number]         MEDICATIONS GIVEN IN ER  Medications - No data to display      ORDERS PLACED DURING THIS VISIT:  Orders Placed This Encounter   Procedures    Respiratory Panel PCR w/COVID-19(SARS-CoV-2) [facility]/JOSELYN/DIGNA/PAD/COR/[facility] In-House, NP Swab in UTM/VTM, 2 HR TAT - Swab, Nasopharynx    XR Chest 2 View    CT Head Without Contrast         OUTPATIENT MEDICATION MANAGEMENT:  No current Epic-ordered facility-administered medications on file.     Current Outpatient Medications Ordered in Epic   Medication Sig Dispense Refill    amLODIPine (NORVASC) 5 MG tablet Take 1 tablet by mouth Daily. 90 tablet 1    apixaban (Eliquis) 5 MG tablet tablet Take 1 tablet by mouth Every 12 (Twelve) Hours. 180 tablet 1    atorvastatin (LIPITOR) 10 MG tablet Take 1 tablet by mouth Daily. 90 tablet 1    baclofen (LIORESAL) 10 MG tablet Take 1 tablet by mouth.      cetirizine (zyrTEC) 10 MG tablet Take 1 tablet by mouth Daily for 30 days. 30 tablet 0    cholecalciferol  (VITAMIN D3) 1000 UNITS tablet Take 2 tablets by mouth Daily.      Cholecalciferol 50 MCG (2000 UT) tablet Take 1 tablet by mouth Daily.      EPINEPHrine (EPIPEN JR IJ) Inject  as directed As Needed.      EPINEPHrine (EPIPEN) 0.3 MG/0.3ML solution auto-injector injection INJECT 0.3 MILLILITER (0.3 MG) BY INTRAMUSCULAR ROUTE ONCE AS NEEDED FOR ANAPHYLAXIS      losartan (COZAAR) 50 MG tablet Take 1 tablet by mouth Daily. 90 tablet 1    montelukast (SINGULAIR) 10 MG tablet Take 1 tablet by mouth Every Night.      omeprazole (priLOSEC) 20 MG capsule Take 1 capsule by mouth Daily. 30 capsule 0    predniSONE (DELTASONE) 20 MG tablet Take 1 tablet by mouth 2 (Two) Times a Day. 10 tablet 0         PROCEDURES  Procedures            PROGRESS, DATA ANALYSIS, CONSULTS, AND MEDICAL DECISION MAKING  All labs have been independently interpreted by me.  All radiology studies have been reviewed by me. All EKG's have been independently viewed and interpreted by me.  Discussion below represents my analysis of pertinent findings related to patient's condition, differential diagnosis, treatment plan and final disposition.    DIFFERENTIAL    Differential diagnosis includes but is not limited to viral URI, allergic rhinitis, bronchitis, migraine, tension headache, ICH    Clinical Scores:                  ED Course as of 06/30/24 1540   Sun Jun 30, 2024   1112 My independent interpretation of the chest x-ray is no cardiomegaly or acute infiltrate [KA]   1134 COVID19: Not Detected [JR]      ED Course User Index  [JR] John Vu MD  [KA] Zandra Ely PA-C     I reassessed the patient, he is resting comfortably, vitals normal on the monitor.  Headache remains mild, 1 or 2 out of 10, he has no neurologic deficits or red flags.  CT brain imaging unremarkable.  Chest x-ray clear, respiratory panel negative.  Does have a significant history of allergies, suspect allergic rhinitis versus viral URI.  I think he can try Tylenol or  Excedrin Migraine for recurrent headache, stable hydrated, close follow-up with PCP in a couple days.  Return precautions discussed.      AS OF 15:40 EDT VITALS:    BP - 138/92  HR - 58  TEMP - 97.9 °F (36.6 °C)  O2 SATS - 98%    COMPLEXITY OF CARE  Admission was considered but after careful review of the patient's presentation, physical examination, diagnostic results, and response to treatment the patient may be safely discharged with outpatient follow-up.      DIAGNOSIS  Final diagnoses:   Viral URI with cough   Nonintractable episodic headache, unspecified headache type         DISPOSITION  ED Disposition       ED Disposition   Discharge    Condition   Good    Comment   --                  FOLLOW UP  Galileo Urbina MD  [id number] [address]  [phone]    In 2 days          Prescribed Medications     Medication List      No changes were made to your prescriptions during this visit.                   Please note that portions of this document were completed with a voice recognition program.    Note Disclaimer: At [facility], we believe that sharing information builds trust and better relationships. You are receiving this note because you recently visited [facility]. It is possible you will see health information before a provider has talked with you about it. This kind of information can be easy to misunderstand. To help you fully understand what it means for your health, we urge you to discuss this note with your provider.         Zandra Ely PA-C  06/30/24 [id number]

## 2024-07-01 NOTE — PROGRESS NOTES
Subjective   Kenrick Webster is a 63 y.o. male.     CC: ED F/U for Cough    History of Present Illness     Patient returns today after being seen in the [facility] emergency department 2 days ago with this note:    Context: Kenrick Webster is a 63 y.o. male with a medical history of hypertension, hyperlipidemia, DVT who presents to the ED c/o acute cough and chest congestion.  He states that started 5 days ago.  He said limited rhinorrhea, no sore throat, no fevers or ear pain vomiting or diarrhea.  He also states he has had an intermittent headache over the course of that time.  It is present but very mild now, at times it is worse.  He states initially started on the left side, then was present only on the right side, at times it is generalized.  It worsens with cough, denies vision disturbance, photophobia, phonophobia, difficulty walking numbness weakness or speaking difficulty.  He states he does have a remote history of migraines but when he started allergy shots they eventually resolved.        Review of prior external notes (non-ED) -and- Review of prior external test results outside of this encounter:  CT head performed 9/2/2023 due to dizziness and double vision showed no acute intracranial findings    Workup was negative, and patient was sent home to be treated symptomatically.    DIAGNOSIS  Final diagnoses:  Viral URI with cough  Nonintractable episodic headache, unspecified headache type    Current outpatient and discharge medications have been reconciled for the patient.  Reviewed by: Galileo Urbina MD    Incidentally, patient had been in the emergency room on 6/12/2024 and diagnosed after workup with atypical chest pain and sent home to be also treated symptomatically.    Pt reports the cough/HA is improved (currently on Amoxil for a dental issue).          The following portions of the patient's history were reviewed and updated as appropriate: allergies, current medications, past family  "history, past medical history, past social history, past surgical history, and problem list.    Review of Systems   Constitutional:  Negative for activity change, chills and fever.   Respiratory:  Negative for cough.    Cardiovascular:  Negative for chest pain.   Psychiatric/Behavioral:  Negative for dysphoric mood.      /70   Pulse 65   Temp 97.9 °F (36.6 °C) (Temporal)   Resp 16   Ht 182.9 cm (72.01\")   Wt 88.5 kg (195 lb)   SpO2 99%   BMI 26.44 kg/m²     Objective   Physical Exam  Vitals and nursing note reviewed.   Constitutional:       General: He is not in acute distress.     Appearance: He is well-developed.   Cardiovascular:      Rate and Rhythm: Normal rate and regular rhythm.   Pulmonary:      Effort: Pulmonary effort is normal.      Breath sounds: Normal breath sounds.   Neurological:      Mental Status: He is alert and oriented to person, place, and time.   Psychiatric:         Behavior: Behavior normal.         Thought Content: Thought content normal.     Hospital records reviewed with pt confirming HPI.      Assessment & Plan   Diagnoses and all orders for this visit:    1. Viral URI with cough (Primary)    2. Nonintractable headache, unspecified chronicity pattern, unspecified headache type    3. Hospital discharge follow-up    Pt improving well and is to continue/finish Rx.              "

## 2024-07-02 ENCOUNTER — OFFICE VISIT (OUTPATIENT)
Dept: FAMILY MEDICINE CLINIC | Facility: CLINIC | Age: 64
End: 2024-07-02
Payer: COMMERCIAL

## 2024-07-02 VITALS
SYSTOLIC BLOOD PRESSURE: 100 MMHG | BODY MASS INDEX: 26.41 KG/M2 | HEIGHT: 72 IN | RESPIRATION RATE: 16 BRPM | TEMPERATURE: 97.9 F | WEIGHT: 195 LBS | OXYGEN SATURATION: 99 % | DIASTOLIC BLOOD PRESSURE: 70 MMHG | HEART RATE: 65 BPM

## 2024-07-02 DIAGNOSIS — R51.9 NONINTRACTABLE HEADACHE, UNSPECIFIED CHRONICITY PATTERN, UNSPECIFIED HEADACHE TYPE: ICD-10-CM

## 2024-07-02 DIAGNOSIS — Z09 HOSPITAL DISCHARGE FOLLOW-UP: ICD-10-CM

## 2024-07-02 DIAGNOSIS — J06.9 VIRAL URI WITH COUGH: Primary | ICD-10-CM

## 2024-07-02 PROCEDURE — 99213 OFFICE O/P EST LOW 20 MIN: CPT | Performed by: FAMILY MEDICINE

## 2024-07-02 RX ORDER — AZELASTINE HYDROCHLORIDE 137 UG/1
SPRAY, METERED NASAL
COMMUNITY
Start: 2024-06-03

## 2024-07-02 RX ORDER — FLUTICASONE PROPIONATE 50 MCG
SPRAY, SUSPENSION (ML) NASAL
COMMUNITY
Start: 2024-06-03

## 2024-07-02 RX ORDER — AMOXICILLIN 500 MG/1
TABLET, FILM COATED ORAL
COMMUNITY
Start: 2024-06-25

## 2024-07-02 RX ORDER — FAMOTIDINE 20 MG/1
1 TABLET, FILM COATED ORAL EVERY 12 HOURS SCHEDULED
COMMUNITY
Start: 2024-06-14

## 2024-09-13 ENCOUNTER — OFFICE VISIT (OUTPATIENT)
Dept: FAMILY MEDICINE CLINIC | Facility: CLINIC | Age: 64
End: 2024-09-13
Payer: COMMERCIAL

## 2024-09-13 VITALS
WEIGHT: 196 LBS | OXYGEN SATURATION: 97 % | HEIGHT: 72 IN | HEART RATE: 87 BPM | DIASTOLIC BLOOD PRESSURE: 80 MMHG | BODY MASS INDEX: 26.55 KG/M2 | TEMPERATURE: 98.6 F | SYSTOLIC BLOOD PRESSURE: 110 MMHG

## 2024-09-13 DIAGNOSIS — R53.83 OTHER FATIGUE: ICD-10-CM

## 2024-09-13 DIAGNOSIS — J01.10 ACUTE NON-RECURRENT FRONTAL SINUSITIS: Primary | ICD-10-CM

## 2024-09-13 DIAGNOSIS — I10 BENIGN ESSENTIAL HYPERTENSION: ICD-10-CM

## 2024-09-13 DIAGNOSIS — R05.8 COUGH WITH CONGESTION OF PARANASAL SINUS: ICD-10-CM

## 2024-09-13 DIAGNOSIS — R09.81 COUGH WITH CONGESTION OF PARANASAL SINUS: ICD-10-CM

## 2024-09-13 PROCEDURE — 99214 OFFICE O/P EST MOD 30 MIN: CPT | Performed by: NURSE PRACTITIONER

## 2024-09-13 RX ORDER — DOXYCYCLINE 100 MG/1
100 CAPSULE ORAL 2 TIMES DAILY
Qty: 20 CAPSULE | Refills: 0 | Status: SHIPPED | OUTPATIENT
Start: 2024-09-13

## 2024-09-13 NOTE — LETTER
September 13, 2024     Patient: Kenrick Webster   YOB: 1960   Date of Visit: 9/13/2024       To Whom It May Concern:      Kenrick Webster was seen in the office today  It is my medical opinion that Kenrick Webster may return to work on Monday 09/16/2024 if no fever/          Sincerely,        BELLO Lopez    CC: No Recipients

## 2024-09-13 NOTE — PROGRESS NOTES
Chief Complaint  Cough, Dizziness, Fatigue, and Nasal Congestion    Subjective        Cough  Pertinent negatives include no chest pain, ear pain, rash, sore throat, shortness of breath or wheezing.   Dizziness  Associated symptoms include congestion, coughing and fatigue. Pertinent negatives include no abdominal pain, chest pain, nausea, rash, sore throat or vomiting.   Fatigue  Associated symptoms include congestion, coughing and fatigue. Pertinent negatives include no abdominal pain, chest pain, nausea, rash, sore throat or vomiting.      Kenrick presents to [facility] as a 63-year-old male complaining of ongoing, cough, congestion, sinus pain and pressure, intermittent dizziness, nasal congestion and fatigue.  He notes that he has had some yellow/green nasal drainage.  He was diagnosed with COVID-19 8/18/2024.  He has been to urgent care 2 other times for continued fatigue since that time  He has been given cough medicine but no other medication.  He does feel that his cough is improved.  He has no shortness of breath or wheezing    He would like to have some lab work completed and a urinalysis.  He has no dysuria no increased frequency or urgency, no back pain or pelvic pain no noticeable blood in his urine      No other acute complaints today    The following portions of the patient's history were reviewed and updated as appropriate: allergies, current medications, past family history, past medical history, past social history, past surgical history, and problem list      Review of Systems   Constitutional:  Positive for fatigue.   HENT:  Positive for congestion and sinus pressure. Negative for ear pain and sore throat.    Eyes:  Negative for visual disturbance.   Respiratory:  Positive for cough. Negative for shortness of breath, wheezing and stridor.    Cardiovascular:  Negative for chest pain, palpitations and leg swelling.   Gastrointestinal:  Negative for abdominal pain,  "diarrhea, nausea and vomiting.   Genitourinary:  Negative for difficulty urinating, dysuria, flank pain, frequency and hematuria.   Skin:  Negative for rash.   Neurological:  Positive for dizziness.        Objective   Vital Signs:   Vitals:    09/13/24 1308   BP: 110/80   Pulse: 87   Temp: 98.6 °F (37 °C)   SpO2: 97%   Weight: 88.9 kg (196 lb)   Height: 182.9 cm (72.01\")   PainSc: 0-No pain            4/3/2024    10:29 AM   PHQ-2/PHQ-9 Depression Screening   Little Interest or Pleasure in Doing Things 0-->not at all   Feeling Down, Depressed or Hopeless 0-->not at all   PHQ-9: Brief Depression Severity Measure Score 0               Physical Exam  Vitals reviewed.   Constitutional:       General: He is not in acute distress.  HENT:      Nose: Congestion present.      Right Turbinates: Swollen.      Left Turbinates: Swollen.      Right Sinus: Maxillary sinus tenderness present.      Left Sinus: Maxillary sinus tenderness present.   Eyes:      Conjunctiva/sclera: Conjunctivae normal.   Neck:      Thyroid: No thyromegaly.      Vascular: No carotid bruit.   Cardiovascular:      Rate and Rhythm: Normal rate and regular rhythm.      Heart sounds: Normal heart sounds.   Pulmonary:      Effort: Pulmonary effort is normal. No respiratory distress.      Breath sounds: Normal breath sounds. No stridor. No wheezing, rhonchi or rales.   Chest:      Chest wall: No tenderness.   Neurological:      Mental Status: He is alert.   Psychiatric:         Attention and Perception: Attention normal.         Mood and Affect: Mood normal.          Result Review :     The following data was reviewed by: BELLO Lopez on 09/13/2024:           Assessment and Plan    Assessment & Plan  Acute non-recurrent frontal sinusitis  Doxycycline  was sent to the patient's pharmacy -  treating likely secondary sinus infection  Covid 19 3-4 weeks ago     Flonase nasal spray as needed  -Take all medications as prescribed and until " completed.  -Monitor for fever and take Tylenol as needed.  Drink plenty of fluids and get plenty of rest.  -Use cool-mist humidifier  -Seek immediate medical attention for fever unrelieved by Tylenol, chest pain, shortness of breath, sharp back pain, or any other worsening signs or symptoms.  -The patient verbalized understanding of all instructions given today.    Other fatigue  Checking labs   Likley related to Covid 08/18/2024  Cough with congestion of paranasal sinus  Improving  Continue cough medicine as needed  Follow-up with any worsening or no improvements  Lungs CTA  Benign essential hypertension  Hypertension is stable and controlled  Continue current treatment regimen.  Blood pressure will be reassessed in 6 months.    Orders Placed This Encounter   Procedures    Comprehensive Metabolic Panel    Urinalysis With Culture If Indicated -    CBC & Differential     New Medications Ordered This Visit   Medications    doxycycline (VIBRAMYCIN) 100 MG capsule     Sig: Take 1 capsule by mouth 2 (Two) Times a Day.     Dispense:  20 capsule     Refill:  0          Follow Up   Return if symptoms worsen or fail to improve, for Follow up with PCP as Directed.  Patient was given instructions and counseling regarding his condition or for health maintenance advice. Please see specific information pulled into the AVS if appropriate.

## 2024-09-14 LAB
ALBUMIN SERPL-MCNC: 4.3 G/DL (ref 3.5–5.2)
ALBUMIN/GLOB SERPL: 1.7 G/DL
ALP SERPL-CCNC: 100 U/L (ref 39–117)
ALT SERPL-CCNC: 23 U/L (ref 1–41)
APPEARANCE UR: CLEAR
AST SERPL-CCNC: 27 U/L (ref 1–40)
BACTERIA #/AREA URNS HPF: NORMAL /[HPF]
BASOPHILS # BLD AUTO: 0.06 10*3/MM3 (ref 0–0.2)
BASOPHILS NFR BLD AUTO: 1.2 % (ref 0–1.5)
BILIRUB SERPL-MCNC: 1.1 MG/DL (ref 0–1.2)
BILIRUB UR QL STRIP: NEGATIVE
BUN SERPL-MCNC: 9 MG/DL (ref 8–23)
BUN/CREAT SERPL: 6.6 (ref 7–25)
CALCIUM SERPL-MCNC: 9.8 MG/DL (ref 8.6–10.5)
CASTS URNS QL MICRO: NORMAL /LPF
CHLORIDE SERPL-SCNC: 105 MMOL/L (ref 98–107)
CO2 SERPL-SCNC: 29.7 MMOL/L (ref 22–29)
COLOR UR: YELLOW
CREAT SERPL-MCNC: 1.37 MG/DL (ref 0.76–1.27)
EGFRCR SERPLBLD CKD-EPI 2021: 58.7 ML/MIN/1.73
EOSINOPHIL # BLD AUTO: 0.26 10*3/MM3 (ref 0–0.4)
EOSINOPHIL NFR BLD AUTO: 5 % (ref 0.3–6.2)
EPI CELLS #/AREA URNS HPF: NORMAL /HPF (ref 0–10)
ERYTHROCYTE [DISTWIDTH] IN BLOOD BY AUTOMATED COUNT: 13.3 % (ref 12.3–15.4)
GLOBULIN SER CALC-MCNC: 2.6 GM/DL
GLUCOSE SERPL-MCNC: 84 MG/DL (ref 65–99)
GLUCOSE UR QL STRIP: NEGATIVE
HCT VFR BLD AUTO: 45.9 % (ref 37.5–51)
HGB BLD-MCNC: 15.3 G/DL (ref 13–17.7)
HGB UR QL STRIP: NEGATIVE
IMM GRANULOCYTES # BLD AUTO: 0.01 10*3/MM3 (ref 0–0.05)
IMM GRANULOCYTES NFR BLD AUTO: 0.2 % (ref 0–0.5)
KETONES UR QL STRIP: NEGATIVE
LEUKOCYTE ESTERASE UR QL STRIP: NEGATIVE
LYMPHOCYTES # BLD AUTO: 1.5 10*3/MM3 (ref 0.7–3.1)
LYMPHOCYTES NFR BLD AUTO: 29.1 % (ref 19.6–45.3)
MCH RBC QN AUTO: 29.7 PG (ref 26.6–33)
MCHC RBC AUTO-ENTMCNC: 33.3 G/DL (ref 31.5–35.7)
MCV RBC AUTO: 89 FL (ref 79–97)
MICRO URNS: NORMAL
MICRO URNS: NORMAL
MONOCYTES # BLD AUTO: 0.55 10*3/MM3 (ref 0.1–0.9)
MONOCYTES NFR BLD AUTO: 10.7 % (ref 5–12)
NEUTROPHILS # BLD AUTO: 2.77 10*3/MM3 (ref 1.7–7)
NEUTROPHILS NFR BLD AUTO: 53.8 % (ref 42.7–76)
NITRITE UR QL STRIP: NEGATIVE
NRBC BLD AUTO-RTO: 0 /100 WBC (ref 0–0.2)
PH UR STRIP: 5.5 [PH] (ref 5–7.5)
PLATELET # BLD AUTO: 345 10*3/MM3 (ref 140–450)
POTASSIUM SERPL-SCNC: 4.1 MMOL/L (ref 3.5–5.2)
PROT SERPL-MCNC: 6.9 G/DL (ref 6–8.5)
PROT UR QL STRIP: NEGATIVE
RBC # BLD AUTO: 5.16 10*6/MM3 (ref 4.14–5.8)
RBC #/AREA URNS HPF: NORMAL /HPF (ref 0–2)
SODIUM SERPL-SCNC: 144 MMOL/L (ref 136–145)
SP GR UR STRIP: 1.01 (ref 1–1.03)
URINALYSIS REFLEX: NORMAL
UROBILINOGEN UR STRIP-MCNC: 0.2 MG/DL (ref 0.2–1)
WBC # BLD AUTO: 5.15 10*3/MM3 (ref 3.4–10.8)
WBC #/AREA URNS HPF: NORMAL /HPF (ref 0–5)

## 2024-09-16 NOTE — PROGRESS NOTES
Labs stable from previous reading  White blood cell count normal no concern for infection/requiring antibiotics.  Urine normal no concern for infection

## 2024-10-01 ENCOUNTER — TELEPHONE (OUTPATIENT)
Dept: FAMILY MEDICINE CLINIC | Facility: CLINIC | Age: 64
End: 2024-10-01
Payer: COMMERCIAL

## 2024-10-01 NOTE — TELEPHONE ENCOUNTER
Caller: Kenrick Webster    Relationship: Self    Best call back number:     [phone] (Mobile)       Caller requesting test results: PATIENT     What test was performed: LABS AND URINE     When was the test performed: 09/13/24    Where was the test performed: IN OFFICE     Additional notes:PATIENT WANTS TO KNOW IF HE NEEDS TO COME BACK IN TO SEE DR. JOHNSON TO GO OVER THESE.

## 2024-10-06 ENCOUNTER — APPOINTMENT (OUTPATIENT)
Dept: MRI IMAGING | Facility: HOSPITAL | Age: 64
End: 2024-10-06
Payer: COMMERCIAL

## 2024-10-06 ENCOUNTER — HOSPITAL ENCOUNTER (OUTPATIENT)
Facility: HOSPITAL | Age: 64
Setting detail: OBSERVATION
Discharge: HOME | End: 2024-10-07
Attending: EMERGENCY MEDICINE | Admitting: EMERGENCY MEDICINE
Payer: COMMERCIAL

## 2024-10-06 DIAGNOSIS — R42 VERTIGO: Primary | ICD-10-CM

## 2024-10-06 DIAGNOSIS — R06.9 ABNORMALITY OF BREATHING: ICD-10-CM

## 2024-10-06 DIAGNOSIS — R53.83 OTHER FATIGUE: ICD-10-CM

## 2024-10-06 LAB
ALBUMIN SERPL-MCNC: 3.9 G/DL (ref 3.5–5.2)
ALBUMIN/GLOB SERPL: 1.7 G/DL
ALP SERPL-CCNC: 86 U/L (ref 39–117)
ALT SERPL W P-5'-P-CCNC: 16 U/L (ref 1–41)
ANION GAP SERPL CALCULATED.3IONS-SCNC: 7 MMOL/L (ref 5–15)
APTT PPP: 27.1 SECONDS (ref 22.7–35.4)
AST SERPL-CCNC: 15 U/L (ref 1–40)
BASOPHILS # BLD AUTO: 0.04 10*3/MM3 (ref 0–0.2)
BASOPHILS NFR BLD AUTO: 0.6 % (ref 0–1.5)
BILIRUB SERPL-MCNC: 0.8 MG/DL (ref 0–1.2)
BUN SERPL-MCNC: 10 MG/DL (ref 8–23)
BUN/CREAT SERPL: 7.9 (ref 7–25)
CALCIUM SPEC-SCNC: 9.1 MG/DL (ref 8.6–10.5)
CHLORIDE SERPL-SCNC: 107 MMOL/L (ref 98–107)
CO2 SERPL-SCNC: 28 MMOL/L (ref 22–29)
CREAT SERPL-MCNC: 1.26 MG/DL (ref 0.76–1.27)
D DIMER PPP FEU-MCNC: <0.27 MCGFEU/ML (ref 0–0.61)
DEPRECATED RDW RBC AUTO: 45.7 FL (ref 37–54)
EGFRCR SERPLBLD CKD-EPI 2021: 64.9 ML/MIN/1.73
EOSINOPHIL # BLD AUTO: 0.37 10*3/MM3 (ref 0–0.4)
EOSINOPHIL NFR BLD AUTO: 5.8 % (ref 0.3–6.2)
ERYTHROCYTE [DISTWIDTH] IN BLOOD BY AUTOMATED COUNT: 13.7 % (ref 12.3–15.4)
GLOBULIN UR ELPH-MCNC: 2.3 GM/DL
GLUCOSE SERPL-MCNC: 73 MG/DL (ref 65–99)
HCT VFR BLD AUTO: 44.2 % (ref 37.5–51)
HGB BLD-MCNC: 14.4 G/DL (ref 13–17.7)
HOLD SPECIMEN: NORMAL
HOLD SPECIMEN: NORMAL
IMM GRANULOCYTES # BLD AUTO: 0.02 10*3/MM3 (ref 0–0.05)
IMM GRANULOCYTES NFR BLD AUTO: 0.3 % (ref 0–0.5)
INR PPP: 1.04 (ref 0.9–1.1)
LYMPHOCYTES # BLD AUTO: 2.11 10*3/MM3 (ref 0.7–3.1)
LYMPHOCYTES NFR BLD AUTO: 33.1 % (ref 19.6–45.3)
MCH RBC QN AUTO: 29.4 PG (ref 26.6–33)
MCHC RBC AUTO-ENTMCNC: 32.6 G/DL (ref 31.5–35.7)
MCV RBC AUTO: 90.4 FL (ref 79–97)
MONOCYTES # BLD AUTO: 0.87 10*3/MM3 (ref 0.1–0.9)
MONOCYTES NFR BLD AUTO: 13.6 % (ref 5–12)
NEUTROPHILS NFR BLD AUTO: 2.97 10*3/MM3 (ref 1.7–7)
NEUTROPHILS NFR BLD AUTO: 46.6 % (ref 42.7–76)
NRBC BLD AUTO-RTO: 0 /100 WBC (ref 0–0.2)
PLATELET # BLD AUTO: 289 10*3/MM3 (ref 140–450)
PMV BLD AUTO: 8.9 FL (ref 6–12)
POTASSIUM SERPL-SCNC: 3.3 MMOL/L (ref 3.5–5.2)
PROT SERPL-MCNC: 6.2 G/DL (ref 6–8.5)
PROTHROMBIN TIME: 13.8 SECONDS (ref 11.7–14.2)
RBC # BLD AUTO: 4.89 10*6/MM3 (ref 4.14–5.8)
SODIUM SERPL-SCNC: 142 MMOL/L (ref 136–145)
TROPONIN T SERPL HS-MCNC: 8 NG/L
WBC NRBC COR # BLD AUTO: 6.38 10*3/MM3 (ref 3.4–10.8)
WHOLE BLOOD HOLD COAG: NORMAL
WHOLE BLOOD HOLD SPECIMEN: NORMAL

## 2024-10-06 PROCEDURE — 93005 ELECTROCARDIOGRAM TRACING: CPT | Performed by: EMERGENCY MEDICINE

## 2024-10-06 PROCEDURE — G0378 HOSPITAL OBSERVATION PER HR: HCPCS

## 2024-10-06 PROCEDURE — 93005 ELECTROCARDIOGRAM TRACING: CPT

## 2024-10-06 PROCEDURE — 93010 ELECTROCARDIOGRAM REPORT: CPT | Performed by: INTERNAL MEDICINE

## 2024-10-06 PROCEDURE — 85730 THROMBOPLASTIN TIME PARTIAL: CPT | Performed by: EMERGENCY MEDICINE

## 2024-10-06 PROCEDURE — 99285 EMERGENCY DEPT VISIT HI MDM: CPT

## 2024-10-06 PROCEDURE — 85025 COMPLETE CBC W/AUTO DIFF WBC: CPT | Performed by: EMERGENCY MEDICINE

## 2024-10-06 PROCEDURE — 85379 FIBRIN DEGRADATION QUANT: CPT | Performed by: NURSE PRACTITIONER

## 2024-10-06 PROCEDURE — 85610 PROTHROMBIN TIME: CPT | Performed by: EMERGENCY MEDICINE

## 2024-10-06 PROCEDURE — 84484 ASSAY OF TROPONIN QUANT: CPT | Performed by: NURSE PRACTITIONER

## 2024-10-06 PROCEDURE — 80053 COMPREHEN METABOLIC PANEL: CPT | Performed by: EMERGENCY MEDICINE

## 2024-10-06 RX ORDER — SODIUM CHLORIDE 0.9 % (FLUSH) 0.9 %
10 SYRINGE (ML) INJECTION EVERY 12 HOURS SCHEDULED
Status: DISCONTINUED | OUTPATIENT
Start: 2024-10-06 | End: 2024-10-07 | Stop reason: HOSPADM

## 2024-10-06 RX ORDER — ONDANSETRON 4 MG/1
4 TABLET, ORALLY DISINTEGRATING ORAL EVERY 6 HOURS PRN
Status: DISCONTINUED | OUTPATIENT
Start: 2024-10-06 | End: 2024-10-07 | Stop reason: HOSPADM

## 2024-10-06 RX ORDER — MECLIZINE HYDROCHLORIDE 25 MG/1
25 TABLET ORAL 3 TIMES DAILY PRN
COMMUNITY
Start: 2024-09-25

## 2024-10-06 RX ORDER — SODIUM CHLORIDE 0.9 % (FLUSH) 0.9 %
10 SYRINGE (ML) INJECTION AS NEEDED
Status: DISCONTINUED | OUTPATIENT
Start: 2024-10-06 | End: 2024-10-07 | Stop reason: HOSPADM

## 2024-10-06 RX ORDER — FAMOTIDINE 20 MG/1
20 TABLET, FILM COATED ORAL EVERY 12 HOURS SCHEDULED
Status: DISCONTINUED | OUTPATIENT
Start: 2024-10-06 | End: 2024-10-07 | Stop reason: HOSPADM

## 2024-10-06 RX ORDER — ONDANSETRON 2 MG/ML
4 INJECTION INTRAMUSCULAR; INTRAVENOUS EVERY 6 HOURS PRN
Status: DISCONTINUED | OUTPATIENT
Start: 2024-10-06 | End: 2024-10-07 | Stop reason: HOSPADM

## 2024-10-06 RX ORDER — POTASSIUM CHLORIDE 750 MG/1
40 TABLET, FILM COATED, EXTENDED RELEASE ORAL ONCE
Status: COMPLETED | OUTPATIENT
Start: 2024-10-06 | End: 2024-10-06

## 2024-10-06 RX ORDER — AMLODIPINE BESYLATE 5 MG/1
5 TABLET ORAL DAILY
Status: DISCONTINUED | OUTPATIENT
Start: 2024-10-07 | End: 2024-10-07 | Stop reason: HOSPADM

## 2024-10-06 RX ORDER — ALBUTEROL SULFATE 90 UG/1
2 INHALANT RESPIRATORY (INHALATION) EVERY 4 HOURS PRN
Status: DISCONTINUED | OUTPATIENT
Start: 2024-10-06 | End: 2024-10-07 | Stop reason: HOSPADM

## 2024-10-06 RX ORDER — ATORVASTATIN CALCIUM 10 MG/1
10 TABLET, FILM COATED ORAL DAILY
Status: DISCONTINUED | OUTPATIENT
Start: 2024-10-07 | End: 2024-10-07 | Stop reason: HOSPADM

## 2024-10-06 RX ORDER — ALBUTEROL SULFATE 90 UG/1
2 INHALANT RESPIRATORY (INHALATION) EVERY 4 HOURS PRN
COMMUNITY

## 2024-10-06 RX ORDER — LOSARTAN POTASSIUM 50 MG/1
50 TABLET ORAL DAILY
Status: DISCONTINUED | OUTPATIENT
Start: 2024-10-07 | End: 2024-10-07 | Stop reason: HOSPADM

## 2024-10-06 RX ADMIN — Medication 10 ML: at 23:26

## 2024-10-06 RX ADMIN — FAMOTIDINE 20 MG: 20 TABLET, FILM COATED ORAL at 23:25

## 2024-10-06 RX ADMIN — APIXABAN 5 MG: 5 TABLET, FILM COATED ORAL at 23:25

## 2024-10-06 RX ADMIN — POTASSIUM CHLORIDE 40 MEQ: 750 TABLET, EXTENDED RELEASE ORAL at 23:25

## 2024-10-06 NOTE — ED PROVIDER NOTES
EMERGENCY DEPARTMENT ENCOUNTER  Room Number:  27/27  PCP: Galileo Urbina MD  Independent Historians: Patient      HPI:  Chief Complaint: had concerns including Dizziness.     A complete HPI/ROS/PMH/PSH/SH/FH are unobtainable due to: None    Chronic or social conditions impacting patient care (Social Determinants of Health): None      Context: Kenrick Webster is a 63 y.o. male with a medical history of Griffin depression, fatigue, hypertension, hyperlipidemia and prior DVT for which he is chronically anticoagulated who presents to the ED c/o subacute intermittent dizziness with certain rapid head movements.  Symptoms last less than half of a minute and then resolved.  No gait disturbance nausea or vomiting with these events.  Patient also complains of ongoing feeling that he cannot feel his lungs completely.  He does not really complain of chronic shortness of breath or exertional dyspnea but rather that only about every third breath feels like he can breathe fully.  This been ongoing for several months and he seen primary care in several ERs for this similar complaint.  He has also complained of ongoing fatigue.  No new changes in symptoms, the patient presents tonight just desiring to feel better.  He has been referred to pulmonology and reports that he has had difficulty keeping a scheduled appointment.      Review of prior external notes (non-ED) -and- Review of prior external test results outside of this encounter:  PCP office note 9/13/2024 reviewed: Patient complained of dizziness with associated cough and congestion as well as fatigue.  Patient diagnosed with acute nonrecurrent frontal sinusitis and was treated with doxycycline  =================================  CTA chest PE protocol 9/25/2024 revealed no pulmonary embolism or other acute pulmonary process      PAST MEDICAL HISTORY  Active Ambulatory Problems     Diagnosis Date Noted    DVT (deep venous thrombosis)     Benign essential hypertension      Called patient, patient informed message below.   NO Question asked     Renal insufficiency     Vitamin D deficiency     Hyperlipidemia     Gastroesophageal reflux disease without esophagitis 11/25/2015    Moderate persistent asthma without complication 12/19/2016    Other chest pain 10/20/2017    Mass 10/20/2017    Stress at home 02/27/2018    Personal history of colonic polyps 01/24/2023    Multiple subsegmental pulmonary emboli without acute cor pulmonale 04/25/2023     Resolved Ambulatory Problems     Diagnosis Date Noted    No Resolved Ambulatory Problems     Past Medical History:   Diagnosis Date    Anxiety and depression     Back pain     Bursitis     Chest pain     Colon polyp     DUNCAN (dyspnea on exertion)     Elevated cholesterol     Fatigue     H/O complete eye exam scheduled         PAST SURGICAL HISTORY  Past Surgical History:   Procedure Laterality Date    APPENDECTOMY      COLONOSCOPY      ENDOSCOPY      OTHER SURGICAL HISTORY      gallbladder testing or surgery    ROTATOR CUFF REPAIR           FAMILY HISTORY  Family History   Problem Relation Age of Onset    Heart disease Father     Stroke Sister     Hypertension Sister     Kidney disease Sister     Diabetes Brother          SOCIAL HISTORY  Social History     Socioeconomic History    Marital status: Single   Tobacco Use    Smoking status: Never     Passive exposure: Never    Smokeless tobacco: Never   Vaping Use    Vaping status: Never Used   Substance and Sexual Activity    Alcohol use: No    Drug use: No    Sexual activity: Defer         ALLERGIES  Patient has no known allergies.      REVIEW OF SYSTEMS  Review of Systems  Included in HPI  All systems reviewed and negative except for those discussed in HPI.      PHYSICAL EXAM    I have reviewed the triage vital signs and nursing notes.    ED Triage Vitals   Temp Heart Rate Resp BP SpO2   10/06/24 1812 10/06/24 1812 10/06/24 1812 10/06/24 1814 10/06/24 1812   98.9 °F (37.2 °C) 97 18 123/80 98 %      Temp src Heart Rate Source Patient Position BP Location FiO2 (%)   -- --  -- -- --              Physical Exam    Physical Exam   Constitutional: No distress.  Nontoxic  HENT:  Head: Normocephalic and atraumatic.  TMs clear bilaterally with some cerumen left greater than right.  Oropharynx: Mucous membranes are moist.   Eyes: . No scleral icterus. No conjunctival pallor.  PERRL, EOMI. Rightward going nystagmas  Neck: Normal range of motion. Neck supple.  No meningismus  Cardiovascular: Pink warm and well perfused throughout.    Pulmonary/Chest: No respiratory distress.  No tachypnea or increased work of breathing appreciated.    Abdominal: Soft. There is no tenderness. There is no rebound and no guarding.   Musculoskeletal: Moves all extremities equally.    Neurological: Alert and oriented.  No acute focal deficit appreciated.  NIH stroke scale-0.  Stable gait and station.  Skin: Skin is pink, warm, and dry.   Psychiatric: Mood and affect normal.   Nursing note and vitals reviewed.             LAB RESULTS  Recent Results (from the past 24 hour(s))   ECG 12 Lead Other; dizziness    Collection Time: 10/06/24  6:18 PM   Result Value Ref Range    QT Interval 333 ms    QTC Interval 377 ms   Green Top (Gel)    Collection Time: 10/06/24  7:31 PM   Result Value Ref Range    Extra Tube Hold for add-ons.    Lavender Top    Collection Time: 10/06/24  7:31 PM   Result Value Ref Range    Extra Tube hold for add-on    Gold Top - SST    Collection Time: 10/06/24  7:31 PM   Result Value Ref Range    Extra Tube Hold for add-ons.    Light Blue Top    Collection Time: 10/06/24  7:31 PM   Result Value Ref Range    Extra Tube Hold for add-ons.          RADIOLOGY  No Radiology Exams Resulted Within Past 24 Hours      MEDICATIONS GIVEN IN ER  Medications   sodium chloride 0.9 % flush 10 mL (has no administration in time range)         ORDERS PLACED DURING THIS VISIT:  Orders Placed This Encounter   Procedures    [place] Draw    Comprehensive Metabolic Panel    Protime-INR    aPTT    Monitor Blood Pressure     Orthostatic Vitals    ECG 12 Lead Other; dizziness    Insert Peripheral IV    Initiate ED Observation Status    Green Top (Gel)    Lavender Top    Gold Top - SST    Light Blue Top    CBC & Differential         OUTPATIENT MEDICATION MANAGEMENT:  Current Facility-Administered Medications Ordered in Epic   Medication Dose Route Frequency Provider Last Rate Last Admin    sodium chloride 0.9 % flush 10 mL  10 mL Intravenous PRN Mayur Marcano MD         Current Outpatient Medications Ordered in Epic   Medication Sig Dispense Refill    amLODIPine (NORVASC) 5 MG tablet Take 1 tablet by mouth Daily. 90 tablet 1    apixaban (Eliquis) 5 MG tablet tablet Take 1 tablet by mouth Every 12 (Twelve) Hours. 180 tablet 1    atorvastatin (LIPITOR) 10 MG tablet Take 1 tablet by mouth Daily. 90 tablet 1    Azelastine HCl 137 MCG/SPRAY solution SPRAY 2 SPRAYS VIA INTRANASAL ROUTE IN BOTH NOSTRILS DAILY IN MORNING FOR 30 DAYS.      benzonatate (TESSALON) 200 MG capsule Take 1 capsule by mouth 3 (Three) Times a Day As Needed for Cough. 15 capsule 0    cholecalciferol (VITAMIN D3) 1000 UNITS tablet Take 2 tablets by mouth Daily.      famotidine (PEPCID) 20 MG tablet Take 1 tablet by mouth Every 12 (Twelve) Hours.      fluticasone (FLONASE) 50 MCG/ACT nasal spray SPRAY 2 SPRAYS IN EACH NOSTRIL DAILY FOR 30 DAYS.      losartan (COZAAR) 50 MG tablet Take 1 tablet by mouth Daily. 90 tablet 1    meclizine (ANTIVERT) 25 MG tablet Take 1 tablet by mouth 3 (Three) Times a Day As Needed for Dizziness.      Cholecalciferol 50 MCG (2000 UT) tablet Take 1 tablet by mouth Daily.      doxycycline (VIBRAMYCIN) 100 MG capsule Take 1 capsule by mouth 2 (Two) Times a Day. 20 capsule 0    EPINEPHrine (EPIPEN) 0.3 MG/0.3ML solution auto-injector injection INJECT 0.3 MILLILITER (0.3 MG) BY INTRAMUSCULAR ROUTE ONCE AS NEEDED FOR ANAPHYLAXIS      omeprazole (priLOSEC) 20 MG capsule Take 1 capsule by mouth Daily. (Patient not taking: Reported on 9/13/2024) 30  capsule 0         PROCEDURES  Procedures            PROGRESS, DATA ANALYSIS, CONSULTS, AND MEDICAL DECISION MAKING  All labs have been independently interpreted by me.  All radiology studies have been reviewed by me. All EKG's have been independently viewed and interpreted by me.  Discussion below represents my analysis of pertinent findings related to patient's condition, differential diagnosis, treatment plan and final disposition.    Differential diagnosis:   My differential diagnosis for dizziness included but is not limited to:  Labyrinthitis, vertigo, concussion, intracranial hemorrhage, stroke, migraine headache, cardiac arrhythmias, acute MI, hypotension, hypertension, hypoxia, inner ear and middle ear infections, anemia, electrolyte abnormalities, dehydration, hyperventilation and anxiety attacks..      Clinical Scores:                  ED Course as of 10/06/24 2027   Sun Oct 06, 2024   2024 I had a long discussion with the patient about her options.  He has had recent labs and CT of the chest.  I do not think I can offer much more towards his fatigue or dyspnea.  He has been referred for pulmonology follow-up.  His vertigo seems intermittent and not severe but it is clearly frustrating him and causing some impairment in his daily activities.  I offered the possibility of observation unit admission for MRI of the brain and vestibular therapy consultation.  Initially he had declined this but after some time to think he is agreeable with this plan.  Labs and EKG will be ordered here in the ED. [RS]   2025 CONSULT        Provider: TOMI CALVO    Discussion: Reviewed patient history, ED presentation and evaluation as well as pending laboratory evaluation.  Agreeable to accept patient to the observation unit for further evaluation and treatment.    Agreeable c treatment and planned disposition.         [RS]   2026 EKG           EKG time: 1818  Rhythm/Rate: Sinus, 75  P waves and IL: ROJAS within normal  limits  QRS, axis: Narrow complex  ST and T waves: No STEMI; QTc within normal limits    Interpreted Contemporaneously by me, independently viewed  Comparison: 6/12/2024-no acute change   [RS]      ED Course User Index  [RS] Mayur Marcano MD         Prescription drug monitoring program review:     AS OF 20:27 EDT VITALS:    BP - 123/80  HR - 97  TEMP - 98.9 °F (37.2 °C)  O2 SATS - 98%    COMPLEXITY OF CARE  The patient requires admission.      DIAGNOSIS  Final diagnoses:   Abnormality of breathing   Other fatigue   Vertigo         DISPOSITION  ED Disposition       ED Disposition   Decision to Admit    Condition   --    Comment   --                  ADMISSION    Discussed treatment plan and reason for admission with pt/family and admitting physician.  Pt/family voiced understanding of the plan for admission for further testing/treatment as needed.       Please note that portions of this document were completed with a voice recognition program.    Note Disclaimer: At [facility], we believe that sharing information builds trust and better relationships. You are receiving this note because you recently visited [facility]. It is possible you will see health information before a provider has talked with you about it. This kind of information can be easy to misunderstand. To help you fully understand what it means for your health, we urge you to discuss this note with your provider.           Mayur Marcano MD  10/06/24 2027

## 2024-10-06 NOTE — ED TRIAGE NOTES
Patient to ED via pv from home. Patient c/o intermittent dizziness that is worse with head movement x 1 month. Patient was given Meclizine but reports does not help.

## 2024-10-07 ENCOUNTER — APPOINTMENT (OUTPATIENT)
Dept: CARDIOLOGY | Facility: HOSPITAL | Age: 64
End: 2024-10-07
Payer: COMMERCIAL

## 2024-10-07 ENCOUNTER — APPOINTMENT (OUTPATIENT)
Dept: MRI IMAGING | Facility: HOSPITAL | Age: 64
End: 2024-10-07
Payer: COMMERCIAL

## 2024-10-07 ENCOUNTER — READMISSION MANAGEMENT (OUTPATIENT)
Dept: CALL CENTER | Facility: HOSPITAL | Age: 64
End: 2024-10-07
Payer: COMMERCIAL

## 2024-10-07 ENCOUNTER — APPOINTMENT (OUTPATIENT)
Dept: CT IMAGING | Facility: HOSPITAL | Age: 64
End: 2024-10-07
Payer: COMMERCIAL

## 2024-10-07 VITALS
TEMPERATURE: 97.6 F | HEART RATE: 58 BPM | RESPIRATION RATE: 18 BRPM | DIASTOLIC BLOOD PRESSURE: 95 MMHG | WEIGHT: 198 LBS | SYSTOLIC BLOOD PRESSURE: 125 MMHG | HEIGHT: 72 IN | BODY MASS INDEX: 26.82 KG/M2 | OXYGEN SATURATION: 97 %

## 2024-10-07 PROBLEM — R42 DIZZINESS: Status: RESOLVED | Noted: 2024-10-06 | Resolved: 2024-10-07

## 2024-10-07 LAB
ANION GAP SERPL CALCULATED.3IONS-SCNC: 9.1 MMOL/L (ref 5–15)
AORTIC DIMENSIONLESS INDEX: 0.9 (DI)
ASCENDING AORTA: 3.4 CM
BH CV ECHO MEAS - ACS: 2.6 CM
BH CV ECHO MEAS - AO MAX PG: 5.8 MMHG
BH CV ECHO MEAS - AO MEAN PG: 3.2 MMHG
BH CV ECHO MEAS - AO ROOT DIAM: 3.5 CM
BH CV ECHO MEAS - AO V2 MAX: 120.3 CM/SEC
BH CV ECHO MEAS - AO V2 VTI: 24.1 CM
BH CV ECHO MEAS - AVA(I,D): 3.1 CM2
BH CV ECHO MEAS - EDV(CUBED): 60.7 ML
BH CV ECHO MEAS - EDV(MOD-SP2): 99 ML
BH CV ECHO MEAS - EDV(MOD-SP4): 103 ML
BH CV ECHO MEAS - EF(MOD-BP): 60.5 %
BH CV ECHO MEAS - EF(MOD-SP2): 62.6 %
BH CV ECHO MEAS - EF(MOD-SP4): 59.2 %
BH CV ECHO MEAS - ESV(CUBED): 18.4 ML
BH CV ECHO MEAS - ESV(MOD-SP2): 37 ML
BH CV ECHO MEAS - ESV(MOD-SP4): 42 ML
BH CV ECHO MEAS - FS: 32.8 %
BH CV ECHO MEAS - IVS/LVPW: 1.22 CM
BH CV ECHO MEAS - IVSD: 1.24 CM
BH CV ECHO MEAS - LA DIMENSION: 3 CM
BH CV ECHO MEAS - LAT PEAK E' VEL: 10 CM/SEC
BH CV ECHO MEAS - LV DIASTOLIC VOL/BSA (35-75): 48.6 CM2
BH CV ECHO MEAS - LV MASS(C)D: 147 GRAMS
BH CV ECHO MEAS - LV MAX PG: 5.7 MMHG
BH CV ECHO MEAS - LV MEAN PG: 2.6 MMHG
BH CV ECHO MEAS - LV SYSTOLIC VOL/BSA (12-30): 19.8 CM2
BH CV ECHO MEAS - LV V1 MAX: 119.3 CM/SEC
BH CV ECHO MEAS - LV V1 VTI: 22.1 CM
BH CV ECHO MEAS - LVIDD: 3.9 CM
BH CV ECHO MEAS - LVIDS: 2.6 CM
BH CV ECHO MEAS - LVOT AREA: 3.4 CM2
BH CV ECHO MEAS - LVOT DIAM: 2.07 CM
BH CV ECHO MEAS - LVPWD: 1.01 CM
BH CV ECHO MEAS - MED PEAK E' VEL: 7.9 CM/SEC
BH CV ECHO MEAS - MV A DUR: 0.13 SEC
BH CV ECHO MEAS - MV A MAX VEL: 82.3 CM/SEC
BH CV ECHO MEAS - MV DEC SLOPE: 338.9 CM/SEC2
BH CV ECHO MEAS - MV DEC TIME: 174 SEC
BH CV ECHO MEAS - MV E MAX VEL: 58.2 CM/SEC
BH CV ECHO MEAS - MV E/A: 0.71
BH CV ECHO MEAS - MV MAX PG: 3.5 MMHG
BH CV ECHO MEAS - MV MEAN PG: 1.42 MMHG
BH CV ECHO MEAS - MV P1/2T: 83.2 MSEC
BH CV ECHO MEAS - MV V2 VTI: 35 CM
BH CV ECHO MEAS - MVA(P1/2T): 2.6 CM2
BH CV ECHO MEAS - MVA(VTI): 2.13 CM2
BH CV ECHO MEAS - PA ACC TIME: 0.13 SEC
BH CV ECHO MEAS - PA V2 MAX: 89.8 CM/SEC
BH CV ECHO MEAS - PI END-D VEL: 106.9 CM/SEC
BH CV ECHO MEAS - PULM A REVS DUR: 0.12 SEC
BH CV ECHO MEAS - PULM A REVS VEL: 43.8 CM/SEC
BH CV ECHO MEAS - PULM DIAS VEL: 40.6 CM/SEC
BH CV ECHO MEAS - PULM S/D: 1.45
BH CV ECHO MEAS - PULM SYS VEL: 58.8 CM/SEC
BH CV ECHO MEAS - QP/QS: 0.65
BH CV ECHO MEAS - RAP SYSTOLE: 3 MMHG
BH CV ECHO MEAS - RV MAX PG: 2.12 MMHG
BH CV ECHO MEAS - RV V1 MAX: 72.8 CM/SEC
BH CV ECHO MEAS - RV V1 VTI: 15.1 CM
BH CV ECHO MEAS - RVDD: 2.36 CM
BH CV ECHO MEAS - RVOT DIAM: 2.02 CM
BH CV ECHO MEAS - RVSP: 24.7 MMHG
BH CV ECHO MEAS - SUP REN AO DIAM: 2.3 CM
BH CV ECHO MEAS - SV(LVOT): 74.7 ML
BH CV ECHO MEAS - SV(MOD-SP2): 62 ML
BH CV ECHO MEAS - SV(MOD-SP4): 61 ML
BH CV ECHO MEAS - SV(RVOT): 48.6 ML
BH CV ECHO MEAS - SVI(LVOT): 35.2 ML/M2
BH CV ECHO MEAS - SVI(MOD-SP2): 29.2 ML/M2
BH CV ECHO MEAS - SVI(MOD-SP4): 28.8 ML/M2
BH CV ECHO MEAS - TAPSE (>1.6): 1.97 CM
BH CV ECHO MEAS - TR MAX PG: 21.7 MMHG
BH CV ECHO MEAS - TR MAX VEL: 233 CM/SEC
BH CV ECHO MEASUREMENTS AVERAGE E/E' RATIO: 6.5
BH CV STRESS BP STAGE 1: NORMAL
BH CV STRESS BP STAGE 2: NORMAL
BH CV STRESS BP STAGE 3: NORMAL
BH CV STRESS DURATION MIN STAGE 1: 3
BH CV STRESS DURATION MIN STAGE 2: 3
BH CV STRESS DURATION MIN STAGE 3: 3
BH CV STRESS DURATION SEC STAGE 1: 0
BH CV STRESS DURATION SEC STAGE 2: 0
BH CV STRESS DURATION SEC STAGE 3: 0
BH CV STRESS ECHO POST STRESS EJECTION FRACTION EF: 81 %
BH CV STRESS GRADE STAGE 1: 10
BH CV STRESS GRADE STAGE 2: 12
BH CV STRESS GRADE STAGE 3: 14
BH CV STRESS HR STAGE 1: 90
BH CV STRESS HR STAGE 2: 101
BH CV STRESS HR STAGE 3: 133
BH CV STRESS METS STAGE 1: 5
BH CV STRESS METS STAGE 2: 7.5
BH CV STRESS METS STAGE 3: 10
BH CV STRESS PROTOCOL 1: NORMAL
BH CV STRESS RECOVERY BP: NORMAL MMHG
BH CV STRESS RECOVERY HR: 90 BPM
BH CV STRESS SPEED STAGE 1: 1.7
BH CV STRESS SPEED STAGE 2: 2.5
BH CV STRESS SPEED STAGE 3: 3.4
BH CV STRESS STAGE 1: 1
BH CV STRESS STAGE 2: 2
BH CV STRESS STAGE 3: 3
BH CV XLRA - RV BASE: 2.6 CM
BH CV XLRA - RV LENGTH: 6.7 CM
BH CV XLRA - RV MID: 1.96 CM
BH CV XLRA - TDI S': 14.9 CM/SEC
BUN SERPL-MCNC: 9 MG/DL (ref 8–23)
BUN/CREAT SERPL: 7.1 (ref 7–25)
CALCIUM SPEC-SCNC: 8.8 MG/DL (ref 8.6–10.5)
CHLORIDE SERPL-SCNC: 106 MMOL/L (ref 98–107)
CO2 SERPL-SCNC: 25.9 MMOL/L (ref 22–29)
CREAT SERPL-MCNC: 1.26 MG/DL (ref 0.76–1.27)
DEPRECATED RDW RBC AUTO: 45.9 FL (ref 37–54)
EGFRCR SERPLBLD CKD-EPI 2021: 64.9 ML/MIN/1.73
ERYTHROCYTE [DISTWIDTH] IN BLOOD BY AUTOMATED COUNT: 13.9 % (ref 12.3–15.4)
GLUCOSE SERPL-MCNC: 91 MG/DL (ref 65–99)
HCT VFR BLD AUTO: 41.4 % (ref 37.5–51)
HGB BLD-MCNC: 13.7 G/DL (ref 13–17.7)
LEFT ATRIUM VOLUME INDEX: 8.4 ML/M2
MAXIMAL PREDICTED HEART RATE: 159 BPM
MCH RBC QN AUTO: 29.8 PG (ref 26.6–33)
MCHC RBC AUTO-ENTMCNC: 33.1 G/DL (ref 31.5–35.7)
MCV RBC AUTO: 90 FL (ref 79–97)
PERCENT MAX PREDICTED HR: 85.53 %
PLATELET # BLD AUTO: 255 10*3/MM3 (ref 140–450)
PMV BLD AUTO: 8.8 FL (ref 6–12)
POTASSIUM SERPL-SCNC: 3.8 MMOL/L (ref 3.5–5.2)
QT INTERVAL: 333 MS
QTC INTERVAL: 377 MS
RBC # BLD AUTO: 4.6 10*6/MM3 (ref 4.14–5.8)
SINUS: 3.4 CM
SODIUM SERPL-SCNC: 141 MMOL/L (ref 136–145)
STJ: 3.2 CM
STRESS BASELINE BP: NORMAL MMHG
STRESS BASELINE HR: 66 BPM
STRESS PERCENT HR: 101 %
STRESS POST ESTIMATED WORKLOAD: 10.2 METS
STRESS POST EXERCISE DUR MIN: 9 MIN
STRESS POST EXERCISE DUR SEC: 0 SEC
STRESS POST PEAK BP: NORMAL MMHG
STRESS POST PEAK HR: 136 BPM
STRESS TARGET HR: 135 BPM
TROPONIN T SERPL HS-MCNC: 9 NG/L
WBC NRBC COR # BLD AUTO: 5.69 10*3/MM3 (ref 3.4–10.8)

## 2024-10-07 PROCEDURE — 93320 DOPPLER ECHO COMPLETE: CPT

## 2024-10-07 PROCEDURE — G0378 HOSPITAL OBSERVATION PER HR: HCPCS

## 2024-10-07 PROCEDURE — 93352 ADMIN ECG CONTRAST AGENT: CPT | Performed by: INTERNAL MEDICINE

## 2024-10-07 PROCEDURE — 93320 DOPPLER ECHO COMPLETE: CPT | Performed by: INTERNAL MEDICINE

## 2024-10-07 PROCEDURE — 93325 DOPPLER ECHO COLOR FLOW MAPG: CPT | Performed by: INTERNAL MEDICINE

## 2024-10-07 PROCEDURE — 93016 CV STRESS TEST SUPVJ ONLY: CPT | Performed by: INTERNAL MEDICINE

## 2024-10-07 PROCEDURE — 80048 BASIC METABOLIC PNL TOTAL CA: CPT | Performed by: NURSE PRACTITIONER

## 2024-10-07 PROCEDURE — 70496 CT ANGIOGRAPHY HEAD: CPT

## 2024-10-07 PROCEDURE — 99214 OFFICE O/P EST MOD 30 MIN: CPT | Performed by: NURSE PRACTITIONER

## 2024-10-07 PROCEDURE — 93018 CV STRESS TEST I&R ONLY: CPT | Performed by: INTERNAL MEDICINE

## 2024-10-07 PROCEDURE — 93325 DOPPLER ECHO COLOR FLOW MAPG: CPT

## 2024-10-07 PROCEDURE — 25510000001 IOPAMIDOL PER 1 ML: Performed by: EMERGENCY MEDICINE

## 2024-10-07 PROCEDURE — 70551 MRI BRAIN STEM W/O DYE: CPT

## 2024-10-07 PROCEDURE — 93246 EXT ECG>7D<15D RECORDING: CPT

## 2024-10-07 PROCEDURE — 84484 ASSAY OF TROPONIN QUANT: CPT | Performed by: NURSE PRACTITIONER

## 2024-10-07 PROCEDURE — 93017 CV STRESS TEST TRACING ONLY: CPT

## 2024-10-07 PROCEDURE — 70498 CT ANGIOGRAPHY NECK: CPT

## 2024-10-07 PROCEDURE — 85027 COMPLETE CBC AUTOMATED: CPT | Performed by: NURSE PRACTITIONER

## 2024-10-07 PROCEDURE — 93350 STRESS TTE ONLY: CPT

## 2024-10-07 PROCEDURE — 25510000001 PERFLUTREN 6.52 MG/ML SUSPENSION 2 ML VIAL: Performed by: NURSE PRACTITIONER

## 2024-10-07 PROCEDURE — 93350 STRESS TTE ONLY: CPT | Performed by: INTERNAL MEDICINE

## 2024-10-07 RX ORDER — IOPAMIDOL 755 MG/ML
100 INJECTION, SOLUTION INTRAVASCULAR
Status: COMPLETED | OUTPATIENT
Start: 2024-10-07 | End: 2024-10-07

## 2024-10-07 RX ADMIN — LOSARTAN POTASSIUM 50 MG: 50 TABLET, FILM COATED ORAL at 09:15

## 2024-10-07 RX ADMIN — IOPAMIDOL 95 ML: 755 INJECTION, SOLUTION INTRAVENOUS at 00:23

## 2024-10-07 RX ADMIN — FAMOTIDINE 20 MG: 20 TABLET, FILM COATED ORAL at 09:15

## 2024-10-07 RX ADMIN — ATORVASTATIN CALCIUM 10 MG: 10 TABLET, FILM COATED ORAL at 09:15

## 2024-10-07 RX ADMIN — APIXABAN 5 MG: 5 TABLET, FILM COATED ORAL at 09:15

## 2024-10-07 RX ADMIN — Medication 10 ML: at 09:16

## 2024-10-07 RX ADMIN — PERFLUTREN 2 ML: 6.52 INJECTION, SUSPENSION INTRAVENOUS at 13:36

## 2024-10-07 RX ADMIN — AMLODIPINE BESYLATE 5 MG: 5 TABLET ORAL at 09:15

## 2024-10-07 NOTE — CONSULTS
Neurology Consult Note    Consult Date: 10/7/2024    Referring MD: Mayur Marcano MD    Reason for Consult I have been asked to see the patient in neurological consultation to render advice and opinion regarding Dizziness     Kenrick Webster is a 63 y.o. male past medical history of hypertension, hyperlipidemia, DVT/PE on chronic anticoagulation with Eliquis presented to the ER with dizziness.  Neurology has been consulted for the same.    Dizziness has been ongoing for since September intermittent episodes seems to coincide with head movements.  Like moving his head or bending down not associated with diaphoresis palpitations sweating.  Denies any vision problems but he had 2 episodes of dizziness in the last 2 days where his eyes were crossed.  Denies any  weakness numbness speech or vision problems present.  Denies any headache but patient states he is also been having shortness of breath and coughing spells since his COVID which come with mild headache.    He denies any room spinning sensation at present but states 1 of these dizziness spells he had room spinning sensation without any nausea vomiting.    No numbness tingling in his legs no tremor.    Taken from ER notes  PCP office note 9/13/2024 reviewed: Patient complained of dizziness with associated cough and congestion as well as fatigue. Patient diagnosed with acute nonrecurrent frontal sinusitis and was treated with doxycycline.      9/25/24 [facility] ED note reviewed: Patient presented with difficulty breathing and shortness of breath which has been going on since he had COVID 3 to 4  weeks ago. He had stated after he started to recover from COVID that he had a another febrile illness and cough but did not seek medical attention at that time.      Past Medical History:   Diagnosis Date    Anxiety and depression     Back pain     Benign essential hypertension     Bursitis     Chest pain     Colon polyp     DUNCAN (dyspnea on exertion)     DVT (deep venous  "thrombosis)     Elevated cholesterol     Fatigue     H/O complete eye exam scheduled    Hyperlipidemia     Mass     on top of sternum    Renal insufficiency     Vitamin D deficiency        Exam  /63 (BP Location: Left arm, Patient Position: Lying)   Pulse 62   Temp 97.8 °F (36.6 °C) (Tympanic)   Resp 18   Ht 182.9 cm (72\")   Wt 89.9 kg (198 lb 4.8 oz)   SpO2 98%   BMI 26.89 kg/m²   Gen: NAD, vitals reviewed  MS: oriented x3, recent/remote memory intact, normal attention/concentration, language intact, no neglect.  CN: visual acuity grossly normal, PERRL, EOMI, no facial droop, no dysarthria  Motor: 5/5 throughout upper and lower extremities, normal tone    DATA:    Lab Results   Component Value Date    GLUCOSE 91 10/07/2024    CALCIUM 8.8 10/07/2024     10/07/2024    K 3.8 10/07/2024    CO2 25.9 10/07/2024     10/07/2024    BUN 9 10/07/2024    CREATININE 1.26 10/07/2024    EGFRIFAFRI 53 (L) 11/03/2021    EGFRIFNONA 46 (L) 11/03/2021    BCR 7.1 10/07/2024    ANIONGAP 9.1 10/07/2024     Lab Results   Component Value Date    WBC 5.69 10/07/2024    HGB 13.7 10/07/2024    HCT 41.4 10/07/2024    MCV 90.0 10/07/2024     10/07/2024       Lab review:   Sodium 141  Creatinine 1.26  Normal LFTs    LDL 69  TSH 0.98    WBC 5.6  Hemoglobin 13 and platelets 255    Imaging review:   CT head without no acute hypodensity or hyperdensity    CTA head and neck no acute vessel stenosis atherosclerosis mild basilar fenestration    MRI brain no acute diffusion restriction no T2 flair changes no GRE changes    Diagnoses:  Episodic dizziness acute on chronic    Recent COVID infection  Recent sinusitis  Hypertension  Chronic DVT/PE on anticoagulation with Eliquis      Pre-stroke MRS: 0  NIHSS: 0    Plan   Assessment -I believe his dizziness spells might be related to his long COVID symptoms or due to his blood pressure changes, states his dizziness spells are getting better so I would wait and watch, CTAs and " MRI brain are unremarkable for any central etiology.  -If his dizziness spells persist I would recommend a Zio patch and outpatient tilt table test  -He does not have any signs of atypical parkinsonism or sensory neuropathy causing his dizziness spells.  I do not think these are vestibular in origin however if the spells persist he might need an outpatient ENT eval.  Orthostatics have been negative this admission.  Follow-up with outpatient neurosurgery for basilar fenestration I do not think is an intervention candidate but needs needs to be followed up.    No further workup from neurology will sign off at this point.      MDM   Reviewed: Previous charts, nursing notes and vitals   Reviewed: Previous labs and CT/CTA/MRI  scan    Interpretation: Labs and CT/CTA/MRI scan   Total time providing care is :30-74 minutes. This excluded time spent performing separately reportable procedures and services  Consults :Neurology/Stroke    Please note that portions of this note were completed with a voice recognition program.     Kirit Mendoza MD  Neuro Hospitalist /Vascular Neurology.

## 2024-10-07 NOTE — PLAN OF CARE
Goal Outcome Evaluation:            Potassium replaced overnight and returned to WNL. Pt without complaints of dizziness since arrival. Vitals within defined parameters. MRI brain and CTA head/neck obtained, echocardiogram ordered for today.

## 2024-10-07 NOTE — DISCHARGE SUMMARY
ED OBSERVATION PROGRESS/DISCHARGE SUMMARY    Date of Admission: 10/6/2024   LOS: 0 days   PCP: Galileo Urbina MD        Subjective     Hospital Outcome:   63-year-old male with PMH of DVT, hypertension hyperlipidemia was seen and examined at bedside following admission to the observation unit secondary to multiple complaints including intermittent dizziness.  Laboratory evaluation relatively unremarkable with exception for mild hypokalemia of 3.3 that was replaced and resolved.  Flat serial high sensitive troponin.  CTA head and neck negative acute and MRI brain without shows no evidence of acute intracranial findings.  Furthermore patient complains of difficulty breathing and describes as inability to take a deep breath.  D-dimer negative and EKG nonischemic.    No acute event overnight and patient reports his dizziness episodes have improved.  Cardiology evaluated patient and has ordered a stress echo which shows no evidence of myocardial ischemia and recommends discharging patient on ZIO patch.  Neurology seen patient and recommends ZIO patch if dizziness persist and outpatient tilt table test.    ROS:  General: no fevers, chills  Respiratory: no cough, dyspnea  Cardiovascular: no chest pain, palpitations  Abdomen: No abdominal pain, nausea, vomiting, or diarrhea  Neurologic: No focal weakness    Objective   Physical Exam:  I have reviewed the vital signs.  Temp:  [97.6 °F (36.4 °C)-98.9 °F (37.2 °C)] 97.8 °F (36.6 °C)  Heart Rate:  [61-97] 62  Resp:  [14-18] 18  BP: (106-139)/(63-93) 106/63  General Appearance:    Alert, cooperative, no distress  Head:    Normocephalic, atraumatic  Eyes:    Sclerae anicteric  Neck:   Supple, no mass  Lungs: Clear to auscultation bilaterally, respirations unlabored  Heart: Regular rate and rhythm, S1 and S2 normal, no murmur, rub or gallop  Abdomen:  Soft, nontender, bowel sounds active, nondistended  Extremities: No clubbing, cyanosis, or edema to lower extremities  Pulses:  2+  and symmetric in distal lower extremities  Skin: No rashes   Neurologic: Oriented x3, Normal strength to extremities    Results Review:    I have reviewed the labs, radiology results and diagnostic studies.    Results from last 7 days   Lab Units 10/07/24  0456   WBC 10*3/mm3 5.69   HEMOGLOBIN g/dL 13.7   HEMATOCRIT % 41.4   PLATELETS 10*3/mm3 255     Results from last 7 days   Lab Units 10/07/24  0456 10/06/24  1931   SODIUM mmol/L 141 142   POTASSIUM mmol/L 3.8 3.3*   CHLORIDE mmol/L 106 107   CO2 mmol/L 25.9 28.0   BUN mg/dL 9 10   CREATININE mg/dL 1.26 1.26   CALCIUM mg/dL 8.8 9.1   BILIRUBIN mg/dL  --  0.8   ALK PHOS U/L  --  86   ALT (SGPT) U/L  --  16   AST (SGOT) U/L  --  15   GLUCOSE mg/dL 91 73     Imaging Results (Last 24 Hours)       Procedure Component Value Units Date/Time    CT Angiogram Head [[id number]] Collected: 10/07/24 0136     Updated: 10/07/24 0136    Narrative:        Patient: RACHAEL RENDON  Time Out: 01:35  Exam(s): CTA HEAD     EXAM:    CT Angiography Head Without and With Intravenous Contrast    CLINICAL HISTORY:     Reason for exam: dizziness; right eye nystagmus.    TECHNIQUE:  AI analysis of LVO was utilized    Axial computed tomographic angiography images of the head without and   with intravenous contrast.  CTDI is 40.91 mGy and DLP is 1552.2 mGy-cm.    This CT exam was performed according to the principle of ALARA (As Low As   Reasonably Achievable) by using one or more of the following dose   reduction techniques: automated exposure control, adjustment of the mA   and or kV according to patient size, and or use of iterative   reconstruction technique.    MIP reconstructed images were created and reviewed.    COMPARISON:    No relevant prior studies available.    FINDINGS:     VASCULATURE:    Right internal carotid artery:  No significant stenosis.  No aneurysm.    Right anterior cerebral artery:  No significant stenosis.  No aneurysm.    Right middle cerebral artery:  No  significant stenosis.  No aneurysm.    Right posterior cerebral artery:  No significant stenosis.  No aneurysm.      Right vertebral artery:  Unremarkable.      Left internal carotid artery:  No significant stenosis.  No aneurysm.    Left anterior cerebral artery:  No significant stenosis.  No aneurysm.    Left middle cerebral artery:  No significant stenosis.  No aneurysm.    Left posterior cerebral artery:  No significant stenosis.  No aneurysm.    Left vertebral artery:  Unremarkable.      Basilar artery:  No significant stenosis.  No aneurysm.     HEAD:    Brain:  No hemorrhage.  No edema.  Normal enhancement.    Ventricles:  Unremarkable.  No ventriculomegaly.    Bones joints:  No acute fracture.    Soft tissues:  Unremarkable.    Sinuses:  Unremarkable.  No acute sinusitis.    Mastoid air cells:  Unremarkable.  No mastoid effusion.    IMPRESSION:         No significant stenosis.    Brain: No acute hemorrhage, hydrocephalus, or herniation.      Impression:          Electronically signed by Katarina Ashley MD on 10-07-24 at 0135    CT Angiogram Neck [[id number]] Collected: 10/07/24 0135     Updated: 10/07/24 0135    Narrative:        Patient: RACHAEL RENDON  Time Out: 01:35  Exam(s): CTA NECK     EXAM:    CT Angiography Neck With Intravenous Contrast    CLINICAL HISTORY:     Reason for exam: dizziness; right eye nystagmus.    TECHNIQUE:    Routine carotid CT angiography protocol was performed with intravenous   contrast.  NASCET criteria using the distal ICAs for comparison were used   for evaluation of stenoses.  CTDI is 40.91 mGy and DLP is 1552.2 mGy-cm.    This CT exam was performed according to the principle of ALARA (As Low As   Reasonably Achievable) by using one or more of the following dose   reduction techniques: automated exposure control, adjustment of the mA   and or kV according to patient size, and or use of iterative   reconstruction technique.    MIP reconstructed images were created and  reviewed.    COMPARISON:    None.    FINDINGS:     VASCULATURE:    Right common carotid artery:  No significant stenosis.  No dissection.    Right internal carotid artery:  No significant stenosis.  No dissection.      Right vertebral artery:  No significant stenosis.  No dissection.      Left common carotid artery:  No significant stenosis.  No dissection.    Left internal carotid artery:  No significant stenosis.  No dissection.    Left vertebral artery:  No significant stenosis.  No dissection.     NECK:    Lung apices:  Clear.  Mild thyromegaly without discrete nodules.     CAROTID STENOSIS REFERENCE USING NASCET CRITERIA:    % ICA stenosis = (1 - narrowest ICA diameter diameter of distal   cervical ICA) x 100.    Mild - <50% stenosis.    Moderate - 50-69% stenosis.    Severe - 70-94% stenosis.    Near occlusion - 95-99% stenosis.    Occluded - 100% stenosis.    IMPRESSION:         No significant stenosis.  Mild thyromegaly without discrete nodules.      Impression:          Electronically signed by Katarina Ashley MD on 10-07-24 at 0135    MRI Brain Without Contrast [[id number]] Collected: 10/07/24 0135     Updated: 10/07/24 0135    Narrative:        Patient: RACHAEL RENDON  Time Out: 01:30  Exam(s): MRI HEAD Without Contrast     EXAM:    MR Head Without Intravenous Contrast    CLINICAL HISTORY:     Reason for exam: Dizziness; right eye nystagmus.    TECHNIQUE:    Magnetic resonance images of the head brain without intravenous   contrast in multiple planes.    COMPARISON:    6 30 2024    FINDINGS:    Brain:  No mass.  No acute hemorrhage.  No restricted diffusion.   Nonspecific FLAIR hyperintense foci, within normal limits for age.      Ventricles:  No hydrocephalus.    Bones joints:  Unremarkable.  No acute fracture.    Sinuses:  No acute sinusitis.    Mastoid air cells:  No effusion.    Orbits:  Unremarkable.    IMPRESSION:         No acute infarct, hemorrhage, or hydrocephalus.      Impression:           Electronically signed by Katarina Ashley MD on 10-07-24 at 0130            I have reviewed the medications.  ---------------------------------------------------------------------------------------------  Assessment & Plan   Assessment/Problem List    Dizziness      Plan:  Dizziness  Continuous cardiac monitor  Vital signs every 4 hours  Up with assist  Labs unremarkable  CTA of head and neck pending  MRI of brain pending  Neurology consult in a.m.  PT consult for vestibular therapy     Difficulty taking a full, deep breath  History of PE, anticoagulated on Eliquis  CT with PE protocol performed on 9/25/2024 at [facility] was negative for PE  D-dimer pending  Continue Eliquis  Stress echo     CKD  Creatinine 1.26, which is better than baseline     Hypokalemia  Resolved  K+ 3.3, 3.8  KCl 40 meq PO given      Follow-up after Discharge: Neurology cardiology    Ilia Aguilar, APRN 10/07/24 08:34 EDT    I have worn appropriate PPE during this patient encounter, sanitized my hands both with entering and exiting patient's room.      40 minutes has been spent by [facility] providers in the care of this patient while under observation status

## 2024-10-07 NOTE — PLAN OF CARE
Goal Outcome Evaluation:   Pt meets discharge criteria. Pt verbalizes understanding of discharge instruction. Home with ZIO patch.

## 2024-10-07 NOTE — OUTREACH NOTE
Prep Survey      Flowsheet Row Responses   [facility] facility patient discharged from? [place]   Is LACE score < 7 ? Yes   Eligibility [facility]   Date of Admission 10/06/24   Date of Discharge 10/07/24   Discharge Disposition Home or Self Care   Discharge diagnosis Dizziness   Does the patient have one of the following disease processes/diagnoses(primary or secondary)? Other   Does the patient have Home health ordered? No   Is there a DME ordered? No   Prep survey completed? Yes            CRISTOPHER MUHAMMAD - Registered Nurse

## 2024-10-07 NOTE — H&P
" [facility]   HISTORY AND PHYSICAL    Patient Name: Kenrick Webster  : 1960  MRN: [id number]  Primary Care Physician:  Galileo Urbina MD  Date of admission: 10/6/2024    Subjective   Subjective     Chief Complaint:   Chief Complaint   Patient presents with    Dizziness         HPI:    Kenrick Webster is a 63 y.o. male with a medical history of hypertension, hyperlipidemia, DVT and PE, for which he is chronically anticoagulated, who presented to the emergency department with intermittent dizziness.  States the dizziness began in early September with brief, intermittent episodes and seems to coincide with certain head movements when he feels like he needs to work to take a deep breath. Symptoms last less than half of a minute and then spontaneously subside.  No gait disturbance nausea or vomiting with these events. Patient does endorse one episode where \"my eyes crossed.\" Complains of ongoing feeling that he cannot fill his lungs completely.  He does not really complain of chronic shortness of breath or exertional dyspnea but rather that only about every third breath feels like he cannot breathe fully.  This has been ongoing for several months, since having COVID-19, and he has seen his primary care and had several ER visits for this similar complaint along with ongoing fatigue. Patient states he has been compliant with Eliquis. States he carries a diagnosis of asthma, but had not used his rescue inhaler for a long time prior to getting Covid in . He has been referred to pulmonology and reports that he has had difficulty keeping a scheduled appointment.    PCP office note 2024 reviewed: Patient complained of dizziness with associated cough and congestion as well as fatigue. Patient diagnosed with acute nonrecurrent frontal sinusitis and was treated with doxycycline.     24 [facility] ED note reviewed: Patient presented with difficulty breathing and shortness of breath which has been " going on since he had COVID 3 to 4  weeks ago. He had stated after he started to recover from COVID that he had a another febrile illness and cough but did not seek medical attention at that time. He had reported over the past couple days he had been very short of breath with any exertion and when he bends over and stands back up that he felt dizzy. Labs were reviewed and normal.   CTA chest PE protocol performed on 9/25/2024 revealed no pulmonary embolism or other acute pulmonary process. Patient was prescribed a medrol dose pack and meclizine.     ED work-up: CMP unremarkable except for Potassium of 3.3. WBC count normal. EKG non-ischemic, sinus rhythm.     On admission to the ED observation unit, patient is alert and oriented x 4.  Denies any complaints at this time while resting supine on the bed.  Vital signs remained stable.    Review of Systems   All systems were reviewed and negative except for: As documented in HPI    Personal History     Past Medical History:   Diagnosis Date    Anxiety and depression     Back pain     Benign essential hypertension     Bursitis     Chest pain     Colon polyp     DUNCAN (dyspnea on exertion)     DVT (deep venous thrombosis)     Elevated cholesterol     Fatigue     H/O complete eye exam scheduled    Hyperlipidemia     Mass     on top of sternum    Renal insufficiency     Vitamin D deficiency        Past Surgical History:   Procedure Laterality Date    APPENDECTOMY      COLONOSCOPY      ENDOSCOPY      OTHER SURGICAL HISTORY      gallbladder testing or surgery    ROTATOR CUFF REPAIR         Family History: family history includes Diabetes in his brother; Heart disease in his father; Hypertension in his sister; Kidney disease in his sister; Stroke in his sister. Otherwise pertinent FHx was reviewed and not pertinent to current issue.    Social History:  reports that he has never smoked. He has never been exposed to tobacco smoke. He has never used smokeless tobacco. He reports  that he does not drink alcohol and does not use drugs.    Home Medications:  Azelastine HCl, EPINEPHrine, albuterol sulfate HFA, amLODIPine, apixaban, atorvastatin, benzonatate, cholecalciferol, famotidine, fluticasone, losartan, and meclizine    Allergies:  No Known Allergies    Objective   Objective     Vitals:   Temp:  [97.7 °F (36.5 °C)-98.9 °F (37.2 °C)] 97.7 °F (36.5 °C)  Heart Rate:  [61-97] 61  Resp:  [16-18] 17  BP: (121-139)/(80-93) 139/87  Physical Exam    Constitutional: Awake, alert   Eyes: PERRLA, nystagmus noted    HENT: NCAT, mucous membranes moist   Neck: Supple, no thyromegaly, no lymphadenopathy, trachea midline   Respiratory: Clear to auscultation bilaterally, nonlabored respirations    Cardiovascular: RRR, no murmurs, rubs, or gallops   Gastrointestinal: Soft, nontender, nondistended   Musculoskeletal: No bilateral ankle edema, no clubbing or cyanosis to extremities   Psychiatric: Appropriate affect, cooperative  Neurologic: Oriented x 3, strength symmetric in all extremities, Cranial Nerves grossly intact to confrontation, speech clear   Skin: No rashes     Result Review    Result Review:  I have personally reviewed the results from the time of this admission to 10/6/2024 22:16 EDT and agree with these findings:  [x]  Laboratory list / accordion  []  Microbiology  [x]  Radiology  [x]  EKG/Telemetry   []  Cardiology/Vascular   []  Pathology  [x]  Old records  []  Other:  Most notable findings include: None noted      Assessment & Plan   Assessment / Plan     Brief Patient Summary:  Kenrick Webster is a 63 y.o. male who was admitted to the ED observation unit for further evaluation and workup of dizziness.  Dizziness has been intermittent since early September.  Also complains of having difficulty getting a full, deep breath at times.    Active Hospital Problems:  Active Hospital Problems    Diagnosis     **Dizziness      Plan:     Dizziness  Continuous cardiac monitor  Vital signs every 4  hours  Up with assist  Labs unremarkable  CTA of head and neck pending  MRI of brain pending  Neurology consult in a.m.  PT consult for vestibular therapy    Difficulty taking a full, deep breath  History of PE, anticoagulated on Eliquis  CT with PE protocol performed on 9/25/2024 at [facility] was negative for PE  D-dimer pending  Continue Eliquis  Consider echocardiogram in a.m. if neuro work-up negative  Consider cardiology consult if neuro workup is negative    CKD  Creatinine 1.26, which is better than baseline    Hypokalemia  K+ 3.3  KCl 40 meq PO given  Recheck lab in am    VTE Prophylaxis:  Pharmacologic & mechanical VTE prophylaxis orders are present.      CODE STATUS:    Level Of Support Discussed With: Patient  Code Status (Patient has no pulse and is not breathing): CPR (Attempt to Resuscitate)  Medical Interventions (Patient has pulse or is breathing): Full Support    Admission Status:  I believe this patient meets observation status.    Electronically signed by BELLO Bolivar, 10/06/24, 10:16 PM EDT.      75 minutes has been spent by [facility] providers in the care of this patient while under observation status      I have worn appropriate PPE during this patient encounter, sanitized my hands both with entering and exiting patient's room.    I have discussed plan of care with patient including advance care plan and/or surrogate decision maker.  Patient advises that their sister, Fely Rios, will be their primary surrogate decision maker

## 2024-10-07 NOTE — ED NOTES
"Nursing report ED to floor  Kenrick Webster  63 y.o.  male    HPI :  HPI (Adult)  Stated Reason for Visit: intermittent dizziness x 1 month    Chief Complaint  Chief Complaint   Patient presents with    Dizziness       Admitting doctor:   Adrian Woodard MD    Admitting diagnosis:   The primary encounter diagnosis was Vertigo. Diagnoses of Abnormality of breathing and Other fatigue were also pertinent to this visit.    Code status:   Current Code Status       Date Active Code Status Order ID Comments User Context       Not on file            Allergies:   Patient has no known allergies.    Isolation:   No active isolations    Intake and Output  No intake or output data in the 24 hours ending 10/06/24 2046    Weight:       10/06/24  1926   Weight: 86.2 kg (190 lb)       Most recent vitals:   Vitals:    10/06/24 1812 10/06/24 1814 10/06/24 1926 10/06/24 2044   BP:  123/80  130/86   BP Location:    Left arm   Patient Position:    Sitting   Pulse: 97   68   Resp: 18   16   Temp: 98.9 °F (37.2 °C)      SpO2: 98%   98%   Weight:   86.2 kg (190 lb)    Height:   182.9 cm (72\")        Active LDAs/IV Access:   Lines, Drains & Airways       Active LDAs       Name Placement date Placement time Site Days    Peripheral IV 10/06/24 1927 Right Antecubital 10/06/24  1927  Antecubital  less than 1                    Labs (abnormal labs have a star):   Labs Reviewed   COMPREHENSIVE METABOLIC PANEL - Abnormal; Notable for the following components:       Result Value    Potassium 3.3 (*)     All other components within normal limits    Narrative:     GFR Normal >60  Chronic Kidney Disease <60  Kidney Failure <15     RAINBOW DRAW    Narrative:     The following orders were created for panel order [place] Draw.  Procedure                               Abnormality         Status                     ---------                               -----------         ------                     Green Top (Gel)[[id number]]                               "    Final result               Lavender Top[[id number]]                                     Final result               Gold Top - SST[[id number]]                                   Final result               Light Blue Top[[id number]]                                   Final result                 Please view results for these tests on the individual orders.   PROTIME-INR   APTT   GREEN TOP   LAVENDER TOP   GOLD TOP - SST   LIGHT BLUE TOP   CBC AND DIFFERENTIAL       EKG:   ECG 12 Lead Other; dizziness   Preliminary Result   HEART RATE=77  bpm   RR Flgshhbw=017  ms   OH Jkmswovq=094  ms   P Horizontal Axis=-15  deg   P Front Axis=42  deg   QRSD Interval=74  ms   QT Cnudmdqz=499  ms   HDgG=522  ms   QRS Axis=5  deg   T Wave Axis=52  deg   - NORMAL ECG -   Sinus rhythm   Date and Time of Study:2024-10-06 18:18:49          Meds given in ED:   Medications   sodium chloride 0.9 % flush 10 mL (has no administration in time range)       Imaging results:  No radiology results for the last day    Ambulatory status:   - ad manuel    Social issues:   Social History     Socioeconomic History    Marital status: Single   Tobacco Use    Smoking status: Never     Passive exposure: Never    Smokeless tobacco: Never   Vaping Use    Vaping status: Never Used   Substance and Sexual Activity    Alcohol use: No    Drug use: No    Sexual activity: Defer       Peripheral Neurovascular  Peripheral Neurovascular (Adult)  Peripheral Neurovascular WDL: WDL    Neuro Cognitive  Neuro Cognitive (Adult)  Cognitive/Neuro/Behavioral WDL: WDL  Pupils  Pupil PERRLA: yes    Learning  Learning Assessment (Adult)  Learning Readiness and Ability: no barriers identified  Education Provided  Person Taught: patient  Teaching Method: verbal instruction  Teaching Focus: symptom/problem overview, diagnostic test  Education Outcome Evaluation: verbalizes understanding    Respiratory  Respiratory WDL  Respiratory WDL: .WDL except, rhythm/pattern, cough  Rhythm/Pattern,  Respiratory: shortness of breath (feels like he can't take a deep breath, has to lean to the left to take a full inspiration. reports associated dizziness.)  Cough Frequency: infrequent  Cough Type: productive    Abdominal Pain       Pain Assessments  Pain (Adult)  (0-10) Pain Rating: Rest: 0  (0-10) Pain Rating: Activity: 0    NIH Stroke Scale       Charline Uribe RN  10/06/24 20:46 EDT

## 2024-10-07 NOTE — CONSULTS
[facility]        Patient Name: Kenrick Webster  Age/Sex: 63 y.o. male  : 1960  MRN: [id number]    Date of Admission: 10/6/2024  Date of Encounter Visit: 10/07/24  Encounter Provider: BELOL Hernadez  Referring Provider: Mayur Marcano MD  Place of Service: [facility] CARDIOLOGY  Patient Care Team:  Galileo Urbina MD as PCP - General  Galileo Urbina MD as PCP - Family Medicine  Shahid Galindo MD as Consulting Physician (Allergy)  Erick Borges MD as Consulting Physician (Urology)  Dea Arreola MD as Consulting Physician (Orthopedic Surgery)    Subjective:     Chief Complaint: Dizziness    Reason for consult: Dizziness    History of Present Illness:  Kenrick Webster is a 63 y.o. male who follows Dr. Marrero in our office.  Patient was admitted with dizziness.  We have been asked to be seen for dizziness.    Patient with history significant for prior PE, prior DVT, HTN, HLD.    Patient establish care with our clinic in 2023 where he was complaining of fatigue and dyspnea.  He had had a CT scan 2023 which was negative for PE.  It was recommended that patient have a stress echocardiogram however this was never scheduled.    Patient presented to the [facility] emergency room with complaints of ongoing dizziness that has been present since early September with intermittent episodes.  Patient describes that symptoms last less than a minute and spontaneously subside.  He does note that he feels that he has to take frequent deep breaths.  He is anticoagulated with apixaban and reports compliance.  Patient developed COVID-19 virus in 2024 and has reported ongoing dyspnea and dizziness since that time.    Patient was evaluated at [facility] emergency department in 2024 for difficulty breathing and dyspnea.  CTA of the chest with PE protocol was negative for PE or acute  pulmonary process.  Patient was prescribed a Medrol Dosepak and meclizine.    Emergency room department is notable for an unremarkable CMP with the exception of a potassium of 3.3 which has been replaced.  WBC is normal.  ECG tracings have personally been reviewed by me and there are no ischemic changes.  Patient does had a CTA of the head and neck as well as an MRI of the brain which are negative for acute processes.  High-sensitivity troponins negative x 2.    Patient reports that in August he was diagnosed with COVID virus.  He initially recovered quickly but then 2 weeks later got a fever.  Since that time he has not really recovered.  He notes that 2 weeks ago he began feeling poor again with intermittent dizziness as well as dyspnea with exertion and increased fatigue.  He states that activities like walking that he could usually do without any symptoms are now causing him to get extremely short of breath.  He has had appointments with pulmonology however he has had to cancel them secondary to not feeling well.    Prior Cardiac Testing:  Echocardiogram 7/22/2019.  LVEF 67%.  Diastolic function normal.      Past Medical History:  Past Medical History:   Diagnosis Date    Anxiety and depression     Back pain     Benign essential hypertension     Bursitis     Chest pain     Colon polyp     DUNCAN (dyspnea on exertion)     DVT (deep venous thrombosis)     Elevated cholesterol     Fatigue     H/O complete eye exam scheduled    Hyperlipidemia     Mass     on top of sternum    Renal insufficiency     Vitamin D deficiency        Past Surgical History:   Procedure Laterality Date    APPENDECTOMY      COLONOSCOPY      ENDOSCOPY      OTHER SURGICAL HISTORY      gallbladder testing or surgery    ROTATOR CUFF REPAIR         Home Medications:   Medications Prior to Admission   Medication Sig Dispense Refill Last Dose    albuterol sulfate  (90 Base) MCG/ACT inhaler Inhale 2 puffs Every 4 (Four) Hours As Needed for  Wheezing or Shortness of Air.   Past Week    amLODIPine (NORVASC) 5 MG tablet Take 1 tablet by mouth Daily. 90 tablet 1 10/6/2024 at 0900    apixaban (Eliquis) 5 MG tablet tablet Take 1 tablet by mouth Every 12 (Twelve) Hours. 180 tablet 1 10/6/2024 at 0900    atorvastatin (LIPITOR) 10 MG tablet Take 1 tablet by mouth Daily. 90 tablet 1 10/6/2024 at 0900    Azelastine HCl 137 MCG/SPRAY solution Administer 1 spray into the nostril(s) as directed by provider Daily As Needed (congestion).   Past Month    benzonatate (TESSALON) 200 MG capsule Take 1 capsule by mouth 3 (Three) Times a Day As Needed for Cough. 15 capsule 0 Past Month    cholecalciferol (VITAMIN D3) 1000 UNITS tablet Take 2 tablets by mouth Daily.   10/6/2024 at 0900    famotidine (PEPCID) 20 MG tablet Take 1 tablet by mouth Every 12 (Twelve) Hours.   10/6/2024 at 0900    fluticasone (FLONASE) 50 MCG/ACT nasal spray Administer 1 spray into the nostril(s) as directed by provider Daily As Needed for Allergies.   Past Month    losartan (COZAAR) 50 MG tablet Take 1 tablet by mouth Daily. 90 tablet 1 10/6/2024 at 0900    meclizine (ANTIVERT) 25 MG tablet Take 1 tablet by mouth 3 (Three) Times a Day As Needed for Dizziness.   10/6/2024 at 0900    EPINEPHrine (EPIPEN) 0.3 MG/0.3ML solution auto-injector injection INJECT 0.3 MILLILITER (0.3 MG) BY INTRAMUSCULAR ROUTE ONCE AS NEEDED FOR ANAPHYLAXIS   More than a month       Allergies:  No Known Allergies    Past Social History:  Social History     Socioeconomic History    Marital status: Single   Tobacco Use    Smoking status: Never     Passive exposure: Never    Smokeless tobacco: Never   Vaping Use    Vaping status: Never Used   Substance and Sexual Activity    Alcohol use: No    Drug use: No    Sexual activity: Defer       Past Family History:  Family History   Problem Relation Age of Onset    Heart disease Father     Stroke Sister     Hypertension Sister     Kidney disease Sister     Diabetes Brother         Review of Systems   Constitutional: Negative for chills, fever, weight gain and weight loss.   Cardiovascular:  Negative for leg swelling.   Respiratory:  Positive for shortness of breath. Negative for cough, snoring and wheezing.    Hematologic/Lymphatic: Negative for bleeding problem. Does not bruise/bleed easily.   Skin:  Negative for color change.   Musculoskeletal:  Negative for falls, joint pain and myalgias.   Gastrointestinal:  Negative for melena.   Genitourinary:  Negative for hematuria.   Neurological:  Positive for dizziness. Negative for excessive daytime sleepiness.   Psychiatric/Behavioral:  Negative for depression. The patient is not nervous/anxious.          Objective:   Temp:  [97.6 °F (36.4 °C)-98.9 °F (37.2 °C)] 97.8 °F (36.6 °C)  Heart Rate:  [61-97] 62  Resp:  [14-18] 18  BP: (106-139)/(63-93) 106/63   No intake or output data in the 24 hours ending 10/07/24 0831  Body mass index is 26.89 kg/m².      10/06/24  1926 10/06/24  2145   Weight: 86.2 kg (190 lb) 89.9 kg (198 lb 4.8 oz)     Weight change:     Constitutional:       Appearance: Normal appearance. Well-developed.   Eyes:      Conjunctiva/sclera: Conjunctivae normal.   Neck:      Vascular: No carotid bruit.   Pulmonary:      Effort: Pulmonary effort is normal.      Breath sounds: Normal breath sounds.   Cardiovascular:      Normal rate. Regular rhythm. Normal S1. Normal S2.       Murmurs: There is no murmur.      No gallop.  No click. No rub.   Edema:     Peripheral edema absent.   Musculoskeletal: Normal range of motion. Skin:     General: Skin is warm and dry.   Neurological:      Mental Status: Alert and oriented to person, place, and time.      GCS: GCS eye subscore is 4. GCS verbal subscore is 5. GCS motor subscore is 6.   Psychiatric:         Speech: Speech normal.         Behavior: Behavior normal.         Thought Content: Thought content normal.         Judgment: Judgment normal.           Lab Review:   Results from last 7  "days   Lab Units 10/07/24  0456 10/06/24  1931   SODIUM mmol/L 141 142   POTASSIUM mmol/L 3.8 3.3*   CHLORIDE mmol/L 106 107   CO2 mmol/L 25.9 28.0   BUN mg/dL 9 10   CREATININE mg/dL 1.26 1.26   GLUCOSE mg/dL 91 73   CALCIUM mg/dL 8.8 9.1   AST (SGOT) U/L  --  15   ALT (SGPT) U/L  --  16     Results from last 7 days   Lab Units 10/07/24  0456 10/06/24  1931   HSTROP T ng/L 9 8     Results from last 7 days   Lab Units 10/07/24  0456 10/06/24  1931   WBC 10*3/mm3 5.69 6.38   HEMOGLOBIN g/dL 13.7 14.4   HEMATOCRIT % 41.4 44.2   PLATELETS 10*3/mm3 255 289     Results from last 7 days   Lab Units 10/06/24  1931   INR  1.04   APTT seconds 27.1               Invalid input(s): \"LDLCALC\"            Echo EF Estimated  Results for orders placed during the hospital encounter of 07/22/19    Adult Transthoracic Echo Complete W/ Cont if Necessary Per Protocol    Interpretation Summary  · Left ventricular systolic function is normal. Calculated EF = 67%. Estimated EF was in agreement with the calculated EF. Normal left ventricular cavity size and wall thickness noted. All left ventricular wall segments contract normally. Left ventricular diastolic function is normal.      EKG:   I personally viewed and interpreted the patient's EKG      Imaging:  Imaging Results (Most Recent)       Procedure Component Value Units Date/Time    CT Angiogram Head [[id number]] Collected: 10/07/24 0136     Updated: 10/07/24 0136    Narrative:        Patient: RACHAEL RENDON  Time Out: 01:35  Exam(s): CTA HEAD     EXAM:    CT Angiography Head Without and With Intravenous Contrast    CLINICAL HISTORY:     Reason for exam: dizziness; right eye nystagmus.    TECHNIQUE:  AI analysis of LVO was utilized    Axial computed tomographic angiography images of the head without and   with intravenous contrast.  CTDI is 40.91 mGy and DLP is 1552.2 mGy-cm.    This CT exam was performed according to the principle of ALARA (As Low As   Reasonably Achievable) by using " one or more of the following dose   reduction techniques: automated exposure control, adjustment of the mA   and or kV according to patient size, and or use of iterative   reconstruction technique.    MIP reconstructed images were created and reviewed.    COMPARISON:    No relevant prior studies available.    FINDINGS:     VASCULATURE:    Right internal carotid artery:  No significant stenosis.  No aneurysm.    Right anterior cerebral artery:  No significant stenosis.  No aneurysm.    Right middle cerebral artery:  No significant stenosis.  No aneurysm.    Right posterior cerebral artery:  No significant stenosis.  No aneurysm.      Right vertebral artery:  Unremarkable.      Left internal carotid artery:  No significant stenosis.  No aneurysm.    Left anterior cerebral artery:  No significant stenosis.  No aneurysm.    Left middle cerebral artery:  No significant stenosis.  No aneurysm.    Left posterior cerebral artery:  No significant stenosis.  No aneurysm.    Left vertebral artery:  Unremarkable.      Basilar artery:  No significant stenosis.  No aneurysm.     HEAD:    Brain:  No hemorrhage.  No edema.  Normal enhancement.    Ventricles:  Unremarkable.  No ventriculomegaly.    Bones joints:  No acute fracture.    Soft tissues:  Unremarkable.    Sinuses:  Unremarkable.  No acute sinusitis.    Mastoid air cells:  Unremarkable.  No mastoid effusion.    IMPRESSION:         No significant stenosis.    Brain: No acute hemorrhage, hydrocephalus, or herniation.      Impression:          Electronically signed by Katarina Ashley MD on 10-07-24 at 0135    CT Angiogram Neck [[id number]] Collected: 10/07/24 0135     Updated: 10/07/24 0135    Narrative:        Patient: RACHAEL RENDON  Time Out: 01:35  Exam(s): CTA NECK     EXAM:    CT Angiography Neck With Intravenous Contrast    CLINICAL HISTORY:     Reason for exam: dizziness; right eye nystagmus.    TECHNIQUE:    Routine carotid CT angiography protocol was performed with  intravenous   contrast.  NASCET criteria using the distal ICAs for comparison were used   for evaluation of stenoses.  CTDI is 40.91 mGy and DLP is 1552.2 mGy-cm.    This CT exam was performed according to the principle of ALARA (As Low As   Reasonably Achievable) by using one or more of the following dose   reduction techniques: automated exposure control, adjustment of the mA   and or kV according to patient size, and or use of iterative   reconstruction technique.    MIP reconstructed images were created and reviewed.    COMPARISON:    None.    FINDINGS:     VASCULATURE:    Right common carotid artery:  No significant stenosis.  No dissection.    Right internal carotid artery:  No significant stenosis.  No dissection.      Right vertebral artery:  No significant stenosis.  No dissection.      Left common carotid artery:  No significant stenosis.  No dissection.    Left internal carotid artery:  No significant stenosis.  No dissection.    Left vertebral artery:  No significant stenosis.  No dissection.     NECK:    Lung apices:  Clear.  Mild thyromegaly without discrete nodules.     CAROTID STENOSIS REFERENCE USING NASCET CRITERIA:    % ICA stenosis = (1 - narrowest ICA diameter diameter of distal   cervical ICA) x 100.    Mild - <50% stenosis.    Moderate - 50-69% stenosis.    Severe - 70-94% stenosis.    Near occlusion - 95-99% stenosis.    Occluded - 100% stenosis.    IMPRESSION:         No significant stenosis.  Mild thyromegaly without discrete nodules.      Impression:          Electronically signed by Katarina Ashley MD on 10-07-24 at 0135    MRI Brain Without Contrast [[id number]] Collected: 10/07/24 0135     Updated: 10/07/24 0135    Narrative:        Patient: RACHAEL RENDON  Time Out: 01:30  Exam(s): MRI HEAD Without Contrast     EXAM:    MR Head Without Intravenous Contrast    CLINICAL HISTORY:     Reason for exam: Dizziness; right eye nystagmus.    TECHNIQUE:    Magnetic resonance images of the head  brain without intravenous   contrast in multiple planes.    COMPARISON:    6 30 2024    FINDINGS:    Brain:  No mass.  No acute hemorrhage.  No restricted diffusion.   Nonspecific FLAIR hyperintense foci, within normal limits for age.      Ventricles:  No hydrocephalus.    Bones joints:  Unremarkable.  No acute fracture.    Sinuses:  No acute sinusitis.    Mastoid air cells:  No effusion.    Orbits:  Unremarkable.    IMPRESSION:         No acute infarct, hemorrhage, or hydrocephalus.      Impression:          Electronically signed by Katarina Ashley MD on 10-07-24 at 0130                Assessment:     Active Hospital Problems    Diagnosis  POA    **Dizziness [R42]  Yes      Resolved Hospital Problems   No resolved problems to display.          Plan:     Dizziness  Dyspnea with exertion  Exercise intolerance  Generalized fatigue    Kenrick Webster is a 63 y.o. male who presents with intermittent episodes of dizziness as well as exertional dyspnea with associated cough.  Patient was originally evaluated by our clinic in September 2023 for the same complaints, at that time he was recommended to have a stress echocardiogram however this was never scheduled.  Patient has had a CTA of the head and neck as well as MRI of the brain which are negative for acute processes.  Given patient's symptoms I do think that he warrants an ischemic evaluation and would again recommend a stress echocardiogram.  Patient states that he does think that he would be able to walk on a treadmill.  I have canceled his plain echocardiogram and ordered a stress echocardiogram to be done today.  If this is negative I would recommended outpatient ZIO to ensure the patient has not having rhythm issues, if this is negative then I would consider follow-up with pulmonology.    Thank you for allowing me to participate in the care of Kenrick Webster. Feel free to contact me directly with any further questions or concerns.         Paloma Toure,  BELLO  [facility]   [facility]  [address]  [place] [address]  Office: [phone]    10/07/24  08:31 EDT      EMR Dragon/Transcription disclaimer:   Parts of this note may be an electronic transcription/translation of spoken language to printed text using the Dragon dictation system

## 2024-10-07 NOTE — DISCHARGE INSTRUCTIONS
Return to the emergency department with worsening symptoms, uncontrolled pain, inability to tolerate oral liquids, fever greater than 105°F not controlled by Tylenol and ibuprofen or as needed with emergent concerns.    Follow-up with cardiology and neurology outpatient

## 2024-10-08 ENCOUNTER — TRANSITIONAL CARE MANAGEMENT TELEPHONE ENCOUNTER (OUTPATIENT)
Dept: CALL CENTER | Facility: HOSPITAL | Age: 64
End: 2024-10-08
Payer: COMMERCIAL

## 2024-10-08 ENCOUNTER — TELEPHONE (OUTPATIENT)
Dept: NEUROLOGY | Facility: CLINIC | Age: 64
End: 2024-10-08

## 2024-10-08 NOTE — OUTREACH NOTE
Call Center TCM Note      Flowsheet Row Responses   [facility] patient discharged from? [place] [facility]   Does the patient have one of the following disease processes/diagnoses(primary or secondary)? Other   TCM attempt successful? No   Unsuccessful attempts Attempt 1            Stacey Delong MA    10/8/2024, 09:08 EDT

## 2024-10-08 NOTE — TELEPHONE ENCOUNTER
HOSPITAL FOLLOW-UP REQUEST    Caller: Kenrick Webster    Relationship to patient: Self    Best call back number: [phone]    New or established patient? [x] New  [] Established    Date of admission: 10/6/2024    Date of discharge: 10/7/2024    Length of stay (If applicable): 1 DAY    Facility discharged from: [facility]    Diagnosis/Symptoms: DIZZINESS    Suggested follow-up timeframe: UNSURE    Specialty Only: Did you see a [facility] provider?    [x] Yes  [] No    If so, who?: DR. STEWART    Additional notes: DR. STEWART CONSULTED; NOTE DOES NOT MENTION TO FOLLOW UP WITH NEUROLOGY BUT TO FOLLOW UP OUTPATIENT W/ NEUROSURGERY AND ENT INSTEAD. HOWEVER, THE GENERAL DISCHARGE SUMMARY STATES TO FOLLOW UP IN ONE WEEK W/ DR. STEWART. PLEASE REVIEW AND ADVISE ON SCHEDULING.    **REFERRAL CREATED & SENT TO SCHEDULING REVIEW AS WELL.    PLEASE REVIEW AND ADVISE.

## 2024-10-08 NOTE — OUTREACH NOTE
Call Center TCM Note      Flowsheet Row Responses   [facility] patient discharged from? [place]   Does the patient have one of the following disease processes/diagnoses(primary or secondary)? Other   TCM attempt successful? Yes   Call start time 1538   Call end time 1542   Discharge diagnosis Dizziness   Meds reviewed with patient/caregiver? Yes   Does the patient have all medications ordered at discharge? N/A   Prescription comments No changes to current medications at this hospital discharge   Is the patient taking all medications as directed (includes completed medication regime)? Yes   Does the patient have an appointment with their PCP within 7-14 days of discharge? No   Nursing Interventions Patient desires to follow up with specialty only, Routed TCM call to PCP office, Patient declined scheduling/rescheduling appointment at this time   Has home health visited the patient within 72 hours of discharge? N/A   Psychosocial issues? No   Did the patient receive a copy of their discharge instructions? Yes   Nursing interventions Reviewed instructions with patient   What is the patient's perception of their health status since discharge? Improving   Is the patient/caregiver able to teach back signs and symptoms related to disease process for when to call PCP? Yes   Is the patient/caregiver able to teach back signs and symptoms related to disease process for when to call 911? Yes   Is the patient/caregiver able to teach back the hierarchy of who to call/visit for symptoms/problems? PCP, Specialist, Home health nurse, Urgent Care, ED, 911 Yes   If the patient is a current smoker, are they able to teach back resources for cessation? Not a smoker   TCM call completed? Yes   Wrap up additional comments D/C DX: Dizziness - Pt feels pretty well today. States he is going out for short walk to see if dizziness occurs, then may try driving in neighborhood. Pt will not go alone. Pt is  and wants to be  sure he is safe to RTW. Pt will reach out to Neurology office re: RTW note. Neurology Hosp follow up is in 12/2024. Pt declines TCM APPT with PCP Dr Urbina for now due to several recent visits with PCP ofc, but will call if appt needed.   Call end time [phone]            Stacey Delong MA    10/8/2024, 15:45 EDT

## 2024-10-08 NOTE — CASE MANAGEMENT/SOCIAL WORK
Case Management Discharge Note      Final Note: home         Selected Continued Care - Discharged on 10/7/2024 Admission date: 10/6/2024 - Discharge disposition: Home or Self Care      Destination    No services have been selected for the patient.                Durable Medical Equipment    No services have been selected for the patient.                Dialysis/Infusion    No services have been selected for the patient.                Home Medical Care    No services have been selected for the patient.                Therapy    No services have been selected for the patient.                Community Resources    No services have been selected for the patient.                Community & DME    No services have been selected for the patient.                         Final Discharge Disposition Code: 01 - home or self-care

## 2024-10-14 DIAGNOSIS — I26.94 MULTIPLE SUBSEGMENTAL PULMONARY EMBOLI WITHOUT ACUTE COR PULMONALE: Chronic | ICD-10-CM

## 2024-10-14 DIAGNOSIS — I10 BENIGN ESSENTIAL HYPERTENSION: Chronic | ICD-10-CM

## 2024-10-15 RX ORDER — AMLODIPINE BESYLATE 5 MG/1
5 TABLET ORAL DAILY
Qty: 30 TABLET | Refills: 0 | Status: SHIPPED | OUTPATIENT
Start: 2024-10-15

## 2024-10-15 RX ORDER — LOSARTAN POTASSIUM 50 MG/1
50 TABLET ORAL DAILY
Qty: 30 TABLET | Refills: 0 | Status: SHIPPED | OUTPATIENT
Start: 2024-10-15

## 2024-10-15 RX ORDER — APIXABAN 5 MG/1
5 TABLET, FILM COATED ORAL EVERY 12 HOURS
Qty: 60 TABLET | Refills: 0 | Status: SHIPPED | OUTPATIENT
Start: 2024-10-15

## 2024-10-18 ENCOUNTER — TELEPHONE (OUTPATIENT)
Dept: FAMILY MEDICINE CLINIC | Facility: CLINIC | Age: 64
End: 2024-10-18
Payer: COMMERCIAL

## 2024-10-18 NOTE — TELEPHONE ENCOUNTER
Caller: Kenrick Webster    Relationship to patient: Self    Best call back number: [phone]      Patient is needing: HE RECEIVED A LETTER ABOUT HIS ENLARGED PULMONARY NODULES AND HE WOULD LIKE FOR DR. JOHNSON TO TAKE OVER HIS CARE ON THIS MATTER.  PLEASE ADVISE WHAT'S NEXT

## 2024-10-23 ENCOUNTER — TELEPHONE (OUTPATIENT)
Dept: NEUROLOGY | Facility: CLINIC | Age: 64
End: 2024-10-23
Payer: COMMERCIAL

## 2024-10-23 NOTE — TELEPHONE ENCOUNTER
Attempted to LVM but mailbox was full, sent a mail reminder regarding updated appt time due to template change for provider.

## 2024-10-31 ENCOUNTER — OFFICE VISIT (OUTPATIENT)
Dept: FAMILY MEDICINE CLINIC | Facility: CLINIC | Age: 64
End: 2024-10-31
Payer: COMMERCIAL

## 2024-10-31 VITALS
OXYGEN SATURATION: 98 % | WEIGHT: 199 LBS | TEMPERATURE: 97.8 F | HEIGHT: 72 IN | DIASTOLIC BLOOD PRESSURE: 64 MMHG | BODY MASS INDEX: 26.95 KG/M2 | HEART RATE: 80 BPM | RESPIRATION RATE: 16 BRPM | SYSTOLIC BLOOD PRESSURE: 130 MMHG

## 2024-10-31 DIAGNOSIS — E78.2 MIXED HYPERLIPIDEMIA: Chronic | ICD-10-CM

## 2024-10-31 DIAGNOSIS — Z23 IMMUNIZATION DUE: ICD-10-CM

## 2024-10-31 DIAGNOSIS — J45.40 MODERATE PERSISTENT ASTHMA WITHOUT COMPLICATION: ICD-10-CM

## 2024-10-31 DIAGNOSIS — I10 BENIGN ESSENTIAL HYPERTENSION: Primary | Chronic | ICD-10-CM

## 2024-10-31 DIAGNOSIS — I26.94 MULTIPLE SUBSEGMENTAL PULMONARY EMBOLI WITHOUT ACUTE COR PULMONALE: Chronic | ICD-10-CM

## 2024-10-31 PROCEDURE — 99214 OFFICE O/P EST MOD 30 MIN: CPT | Performed by: FAMILY MEDICINE

## 2024-10-31 PROCEDURE — 90471 IMMUNIZATION ADMIN: CPT | Performed by: FAMILY MEDICINE

## 2024-10-31 PROCEDURE — 90656 IIV3 VACC NO PRSV 0.5 ML IM: CPT | Performed by: FAMILY MEDICINE

## 2024-10-31 RX ORDER — AMLODIPINE BESYLATE 5 MG/1
5 TABLET ORAL DAILY
Qty: 90 TABLET | Refills: 1 | Status: SHIPPED | OUTPATIENT
Start: 2024-10-31

## 2024-10-31 RX ORDER — LOSARTAN POTASSIUM 50 MG/1
50 TABLET ORAL DAILY
Qty: 90 TABLET | Refills: 1 | Status: SHIPPED | OUTPATIENT
Start: 2024-10-31

## 2024-10-31 RX ORDER — ATORVASTATIN CALCIUM 10 MG/1
10 TABLET, FILM COATED ORAL DAILY
Qty: 90 TABLET | Refills: 1 | Status: SHIPPED | OUTPATIENT
Start: 2024-10-31

## 2024-10-31 NOTE — PROGRESS NOTES
Injection  Flu vaccine Injection performed in left deltoid  by Julia Johnson MA. Patient tolerated the procedure well without complications.  10/31/24   Julia Johnson MA

## 2024-12-30 ENCOUNTER — APPOINTMENT (OUTPATIENT)
Dept: CT IMAGING | Facility: HOSPITAL | Age: 64
End: 2024-12-30
Payer: COMMERCIAL

## 2024-12-30 ENCOUNTER — HOSPITAL ENCOUNTER (EMERGENCY)
Facility: HOSPITAL | Age: 64
Discharge: HOME | End: 2024-12-30
Attending: EMERGENCY MEDICINE | Admitting: EMERGENCY MEDICINE
Payer: COMMERCIAL

## 2024-12-30 VITALS
RESPIRATION RATE: 16 BRPM | HEART RATE: 93 BPM | TEMPERATURE: 97.3 F | DIASTOLIC BLOOD PRESSURE: 85 MMHG | SYSTOLIC BLOOD PRESSURE: 123 MMHG | OXYGEN SATURATION: 98 %

## 2024-12-30 DIAGNOSIS — R51.9 NONINTRACTABLE HEADACHE, UNSPECIFIED CHRONICITY PATTERN, UNSPECIFIED HEADACHE TYPE: Primary | ICD-10-CM

## 2024-12-30 PROCEDURE — 99284 EMERGENCY DEPT VISIT MOD MDM: CPT

## 2024-12-30 PROCEDURE — 70450 CT HEAD/BRAIN W/O DYE: CPT

## 2024-12-30 RX ORDER — BUTALBITAL, ACETAMINOPHEN AND CAFFEINE 50; 325; 40 MG/1; MG/1; MG/1
2 TABLET ORAL ONCE
Status: COMPLETED | OUTPATIENT
Start: 2024-12-30 | End: 2024-12-30

## 2024-12-30 RX ADMIN — BUTALBITAL, ACETAMINOPHEN, AND CAFFEINE 2 TABLET: 50; 325; 40 TABLET ORAL at 19:11

## 2024-12-31 NOTE — ED PROVIDER NOTES
EMERGENCY DEPARTMENT ENCOUNTER  Room Number:  HD1/G  PCP: Galileo Urbina MD  Independent Historians: Patient  Date of encounter:  12/30/2024  Patient Care Team:  Galileo Urbina MD as PCP - General  Galileo Urbina MD as PCP - Family Medicine  Shahid Galindo MD as Consulting Physician (Allergy)  Erick Borges MD as Consulting Physician (Urology)  Dea rAreola MD as Consulting Physician (Orthopedic Surgery)     HPI:  Chief Complaint: had concerns including Headache.     A complete HPI/ROS/PMH/PSH/SH/FH are unobtainable due to: None    Chronic or social conditions impacting patient care (Social Determinants of Health): None      Context: Kenrick Webster is a 64 y.o. male with a medical history of DVT/PE on Eliquis, hypertension, hyperlipidemia who presents to the ED c/o acute headache.  Patient has had intermittent headaches for the past several months.  They are located on the right side of his head and neck and described as a throbbing sensation.  No associated nausea, vomiting, photophobia or phonophobia.  No associated weakness, numbness, visual changes or speech changes.  Patient was prescribed hydrocodone for the symptoms by his PCP and states that they will control him temporarily.  He does not believe he is had any imaging of his head since these headaches began.    Review of prior external notes (non-ED) -and- Review of prior external test results outside of this encounter:  MRI brain from 10/7/2024 reviewed without evidence of stroke or hemorrhage.  CTA of the patient's head and neck performed on the same date showed no evidence of stenosis or aneurysm.    PAST MEDICAL HISTORY  Active Ambulatory Problems     Diagnosis Date Noted    DVT (deep venous thrombosis)     Benign essential hypertension     Renal insufficiency     Vitamin D deficiency     Hyperlipidemia     Gastroesophageal reflux disease without esophagitis 11/25/2015    Moderate persistent asthma without complication 12/19/2016     Other chest pain 10/20/2017    Mass 10/20/2017    Stress at home 02/27/2018    Personal history of colonic polyps 01/24/2023    Multiple subsegmental pulmonary emboli without acute cor pulmonale 04/25/2023     Resolved Ambulatory Problems     Diagnosis Date Noted    Dizziness 10/06/2024     Past Medical History:   Diagnosis Date    Anxiety and depression     Back pain     Bursitis     Chest pain     Colon polyp     DUNCAN (dyspnea on exertion)     Elevated cholesterol     Fatigue     H/O complete eye exam scheduled       PAST SURGICAL HISTORY  Past Surgical History:   Procedure Laterality Date    APPENDECTOMY      COLONOSCOPY      ENDOSCOPY      OTHER SURGICAL HISTORY      gallbladder testing or surgery    ROTATOR CUFF REPAIR         FAMILY HISTORY  Family History   Problem Relation Age of Onset    Heart disease Father     Stroke Sister     Hypertension Sister     Kidney disease Sister     Diabetes Brother        SOCIAL HISTORY  Social History     Socioeconomic History    Marital status: Single   Tobacco Use    Smoking status: Never     Passive exposure: Never    Smokeless tobacco: Never   Vaping Use    Vaping status: Never Used   Substance and Sexual Activity    Alcohol use: No    Drug use: No    Sexual activity: Defer       ALLERGIES  Patient has no known allergies.    REVIEW OF SYSTEMS  Review of Systems  Included in HPI  All systems reviewed and negative except for those discussed in HPI.    PHYSICAL EXAM    I have reviewed the triage vital signs and nursing notes.    ED Triage Vitals   Temp Heart Rate Resp BP SpO2   12/30/24 1558 12/30/24 1558 12/30/24 1558 12/30/24 1602 12/30/24 1558   97.3 °F (36.3 °C) 93 16 123/85 98 %      Temp src Heart Rate Source Patient Position BP Location FiO2 (%)   12/30/24 1558 12/30/24 1558 12/30/24 1602 -- --   Tympanic Monitor Sitting         Physical Exam  Vitals and nursing note reviewed.   Constitutional:       General: He is not in acute distress.     Appearance: Normal  appearance. He is not ill-appearing, toxic-appearing or diaphoretic.   HENT:      Head: Normocephalic and atraumatic.      Nose: Nose normal.      Mouth/Throat:      Mouth: Mucous membranes are dry.   Eyes:      Extraocular Movements: Extraocular movements intact.      Conjunctiva/sclera: Conjunctivae normal.      Pupils: Pupils are equal, round, and reactive to light.   Cardiovascular:      Rate and Rhythm: Normal rate and regular rhythm.      Pulses: Normal pulses.      Heart sounds: Normal heart sounds. No murmur heard.     No friction rub. No gallop.   Pulmonary:      Effort: Pulmonary effort is normal. No respiratory distress.      Breath sounds: Normal breath sounds. No stridor. No wheezing, rhonchi or rales.   Abdominal:      General: Abdomen is flat. There is no distension.      Palpations: Abdomen is soft.      Tenderness: There is no abdominal tenderness. There is no guarding or rebound.   Musculoskeletal:      Cervical back: Normal range of motion and neck supple.   Skin:     General: Skin is warm and dry.      Capillary Refill: Capillary refill takes less than 2 seconds.   Neurological:      General: No focal deficit present.      Mental Status: He is alert and oriented to person, place, and time. Mental status is at baseline.      Comments: EOMI, no facial droop, normal sensation to both sides of face, no dysarthria, no aphasia, 5/5 strength in upper extremities, normal sensation bilateral upper extremities, 5/5 strength in bilateral lower extremities, normal sensation bilateral lower extremities, normal coordination   Psychiatric:         Mood and Affect: Mood normal.         Behavior: Behavior normal.         Thought Content: Thought content normal.         Judgment: Judgment normal.         LAB RESULTS  No results found for this or any previous visit (from the past 24 hours).    RADIOLOGY  CT Head Without Contrast    Result Date: 12/30/2024  EMERGENCY CT SCAN OF THE HEAD WITHOUT CONTRAST ON  12/30/2024  CLINICAL HISTORY: This is a 64-year-old male patient who has headaches. The patient is on blood thinners  TECHNIQUE: Spiral CT images were obtained from the base of the skull to the vertex without intravenous contrast. The images were reformatted and are submitted in 3 mm thick axial, sagittal and coronal CT sections with brain algorithm.  COMPARISON: This is correlated to an MRI of the brain on 10/07/2024 and a head CT on 10/07/2024.  FINDINGS: The brain parenchyma is normal in attenuation. The ventricles are normal in size. I see no focal mass effect. There is no midline shift. No extra-axial fluid collections are identified. There is no evidence of acute intracranial hemorrhage. The calvarium and the skull base are normal in appearance. The paranasal sinuses and the mastoid air cells and middle ear cavities are clear. The orbits are normal in appearance.      1. Normal head CT. The etiology of this patient's headaches is not established on this exam.  Radiation dose reduction techniques were utilized, including automated exposure control and exposure modulation based on body size.        MEDICATIONS GIVEN IN ER  Medications   butalbital-acetaminophen-caffeine (FIORICET, ESGIC) -40 MG per tablet 2 tablet (2 tablets Oral Given 12/30/24 1911)       ORDERS PLACED DURING THIS VISIT:  Orders Placed This Encounter   Procedures    CT Head Without Contrast    Ambulatory Referral to Neurology (All except [place])       OUTPATIENT MEDICATION MANAGEMENT:  No current Epic-ordered facility-administered medications on file.     Current Outpatient Medications Ordered in Epic   Medication Sig Dispense Refill    albuterol sulfate  (90 Base) MCG/ACT inhaler Inhale 2 puffs Every 4 (Four) Hours As Needed for Wheezing or Shortness of Air.      amLODIPine (NORVASC) 5 MG tablet Take 1 tablet by mouth Daily. 90 tablet 1    apixaban (Eliquis) 5 MG tablet tablet Take 1 tablet by mouth Every 12 (Twelve) Hours. 180  tablet 1    atorvastatin (LIPITOR) 10 MG tablet Take 1 tablet by mouth Daily. 90 tablet 1    Azelastine HCl 137 MCG/SPRAY solution Administer 1 spray into the nostril(s) as directed by provider Daily As Needed (congestion).      cholecalciferol (VITAMIN D3) 1000 UNITS tablet Take 2 tablets by mouth Daily.      EPINEPHrine (EPIPEN) 0.3 MG/0.3ML solution auto-injector injection INJECT 0.3 MILLILITER (0.3 MG) BY INTRAMUSCULAR ROUTE ONCE AS NEEDED FOR ANAPHYLAXIS      famotidine (PEPCID) 20 MG tablet Take 1 tablet by mouth Every 12 (Twelve) Hours.      fluticasone (FLONASE) 50 MCG/ACT nasal spray Administer 1 spray into the nostril(s) as directed by provider Daily As Needed for Allergies.      losartan (COZAAR) 50 MG tablet Take 1 tablet by mouth Daily. 90 tablet 1    meclizine (ANTIVERT) 25 MG tablet Take 1 tablet by mouth 3 (Three) Times a Day As Needed for Dizziness.         PROCEDURES  Procedures    PROGRESS, DATA ANALYSIS, CONSULTS, AND MEDICAL DECISION MAKING  All labs have been independently interpreted by me.  All radiology studies have been reviewed by me. All EKG's have been independently viewed and interpreted by me.  Discussion below represents my analysis of pertinent findings related to patient's condition, differential diagnosis, treatment plan and final disposition.    Differential diagnosis includes but is not limited to headache, intracranial hemorrhage, migraine.    Clinical Scores:                   ED Course as of 12/30/24 2009   Mon Dec 30, 2024   1901 CT Head Without Contrast  My independent interpretation of the imaging study is no intracranial hemorrhage [AB]   1940 Received call from Dr. Augustin with radiology who states that the head CT is normal. [AB]   2005 Informed patient of negative head CT.  Patient states he feels reassured.  Headache is improved after Fioricet.  He states that he will supposed to follow-up with a neurologist previously due to these headaches but missed the appointment.   He will reach out to establish another appointment. [AB]      ED Course User Index  [AB] Kai Cheek MD             AS OF 20:09 EST VITALS:    BP - 123/85  HR - 93  TEMP - 97.3 °F (36.3 °C) (Tympanic)  O2 SATS - 98%    COMPLEXITY OF CARE  Admission was considered but after careful review of the patient's presentation, physical examination, diagnostic results, and response to treatment the patient may be safely discharged with outpatient follow-up.      DIAGNOSIS  Final diagnoses:   Nonintractable headache, unspecified chronicity pattern, unspecified headache type         DISPOSITION  ED Disposition       ED Disposition   Discharge    Condition   Stable    Comment   --                Please note that portions of this document were completed with a voice recognition program.    Note Disclaimer: At [facility], we believe that sharing information builds trust and better relationships. You are receiving this note because you recently visited [facility]. It is possible you will see health information before a provider has talked with you about it. This kind of information can be easy to misunderstand. To help you fully understand what it means for your health, we urge you to discuss this note with your provider.         Kai Cheek MD  12/30/24 2009

## 2025-01-14 ENCOUNTER — OFFICE VISIT (OUTPATIENT)
Dept: NEUROLOGY | Facility: CLINIC | Age: 65
End: 2025-01-14
Payer: COMMERCIAL

## 2025-01-14 VITALS
DIASTOLIC BLOOD PRESSURE: 86 MMHG | BODY MASS INDEX: 27.26 KG/M2 | HEART RATE: 69 BPM | WEIGHT: 201 LBS | OXYGEN SATURATION: 98 % | SYSTOLIC BLOOD PRESSURE: 122 MMHG

## 2025-01-14 DIAGNOSIS — G43.719 INTRACTABLE CHRONIC MIGRAINE WITHOUT AURA AND WITHOUT STATUS MIGRAINOSUS: ICD-10-CM

## 2025-01-14 DIAGNOSIS — R51.9 CHRONIC DAILY HEADACHE: Primary | ICD-10-CM

## 2025-01-14 DIAGNOSIS — Z87.898 HISTORY OF DIZZINESS: ICD-10-CM

## 2025-01-14 NOTE — PROGRESS NOTES
CC: Headaches and Dizziness       Interval history 1/14/2025  Patient states he is doing reasonably well denies any new weakness numbness speech or vision  Patient states he no longer has dizziness he feels much better after that hospital admission  He states he has been having every day headaches left temporal with some nausea denies any visual aura or any visual symptoms  Denies any headache with temporal tenderness or jaw claudication  He was recently in the ER 12/30/2024 for headaches he was prescribed hydrocodone as needed and discharged back home    Initial consult note from hospital  Kenrick Webster is a 63 y.o. male past medical history of hypertension, hyperlipidemia, DVT/PE on chronic anticoagulation with Eliquis presented to the ER with dizziness.  Neurology has been consulted for the same.  Dizziness has been ongoing for since September intermittent episodes seems to coincide with head movements.  Like moving his head or bending down not associated with diaphoresis palpitations sweating.  Denies any vision problems but he had 2 episodes of dizziness in the last 2 days where his eyes were crossed.  Denies any  weakness numbness speech or vision problems present.  Denies any headache but patient states he is also been having shortness of breath and coughing spells since his COVID which come with mild headache.  He denies any room spinning sensation at present but states 1 of these dizziness spells he had room spinning sensation without any nausea vomiting.        Past Medical History:   Diagnosis Date    Anxiety and depression     Back pain     Benign essential hypertension     Bursitis     Chest pain     Colon polyp     DUNCAN (dyspnea on exertion)     DVT (deep venous thrombosis)     Elevated cholesterol     Fatigue     H/O complete eye exam scheduled    Hyperlipidemia     Mass     on top of sternum    Renal insufficiency     Vitamin D deficiency          Past Surgical History:   Procedure Laterality Date     APPENDECTOMY      COLONOSCOPY      ENDOSCOPY      OTHER SURGICAL HISTORY      gallbladder testing or surgery    ROTATOR CUFF REPAIR             Current Outpatient Medications:     albuterol sulfate  (90 Base) MCG/ACT inhaler, Inhale 2 puffs Every 4 (Four) Hours As Needed for Wheezing or Shortness of Air., Disp: , Rfl:     amLODIPine (NORVASC) 5 MG tablet, Take 1 tablet by mouth Daily., Disp: 90 tablet, Rfl: 1    apixaban (Eliquis) 5 MG tablet tablet, Take 1 tablet by mouth Every 12 (Twelve) Hours., Disp: 180 tablet, Rfl: 1    atorvastatin (LIPITOR) 10 MG tablet, Take 1 tablet by mouth Daily., Disp: 90 tablet, Rfl: 1    Azelastine HCl 137 MCG/SPRAY solution, Administer 1 spray into the nostril(s) as directed by provider Daily As Needed (congestion)., Disp: , Rfl:     cholecalciferol (VITAMIN D3) 1000 UNITS tablet, Take 2 tablets by mouth Daily., Disp: , Rfl:     EPINEPHrine (EPIPEN) 0.3 MG/0.3ML solution auto-injector injection, INJECT 0.3 MILLILITER (0.3 MG) BY INTRAMUSCULAR ROUTE ONCE AS NEEDED FOR ANAPHYLAXIS, Disp: , Rfl:     famotidine (PEPCID) 20 MG tablet, Take 1 tablet by mouth Every 12 (Twelve) Hours., Disp: , Rfl:     fluticasone (FLONASE) 50 MCG/ACT nasal spray, Administer 1 spray into the nostril(s) as directed by provider Daily As Needed for Allergies., Disp: , Rfl:     losartan (COZAAR) 50 MG tablet, Take 1 tablet by mouth Daily., Disp: 90 tablet, Rfl: 1    meclizine (ANTIVERT) 25 MG tablet, Take 1 tablet by mouth 3 (Three) Times a Day As Needed for Dizziness., Disp: , Rfl:       Family History   Problem Relation Age of Onset    Heart disease Father     Stroke Sister     Hypertension Sister     Kidney disease Sister     Diabetes Brother          Social History     Socioeconomic History    Marital status: Single   Tobacco Use    Smoking status: Never     Passive exposure: Never    Smokeless tobacco: Never   Vaping Use    Vaping status: Never Used   Substance and Sexual Activity    Alcohol use:  No    Drug use: No    Sexual activity: Defer         No Known Allergies      Pain Scale:        ROS:    Constitutional: [No fevers, chills, sweats or weight loss/gain]   Eye: [No change in vision, double vision, or loss of vision]   HEENT: [No headaches, tenderness, dizziness, or tinnitus. Normal smell and taste. Normal speech and swallowing]   Respiratory: [No shortness of breath, coughing, wheezing]   Cardiovascular: [No Chest pain, palpitations, syncope, DUNCAN]   Gastrointestinal: [Normal bowel function. No nausea, vomiting, diarrhea]   Genitourinary: [Normal bladder function]   Musculoskeletal: [No trauma, joint or neck pain, myalgias, cramping or weakness]   Skin: [No itching, burning, pain, rashes, or birthmarks]   Endocrinology: [No heat or cold intolerance]   Psychiatric: [No sleep disturbance. No anxiety or depression]       Physical Exam:  Vitals:    01/14/25 1050   BP: 122/86   Pulse: 69   SpO2: 98%   Weight: 91.2 kg (201 lb)     Orthostatic BP:    Body mass index is 27.26 kg/m².    General appearance: Well developed, well nourished, well groomed, alert and cooperative.   HEENT: Normocephalic.   Cardiac: Regular rate and rhythm. No murmurs.   Chest Exam: Clear to auscultation bilaterally, no wheezes, no rhonchi.  Extremities: Normal, no edema.   Skin: No rashes or birthmarks.      Higher integrative function: Oriented to time, place, person, normal comprehension repetition and fluency  CN III IV VI: Extraocular movements are full without nystagmus. Pupils are equal, round, and reactive to light.   CN V: Sensation same on both sides of the face  CN VII: Facial movements are symmetric, no weakness.   CN XII: The tongue is midline. No atrophy or fasciculations.   Motor: Good strength 5/5 both UE and LE B/L  no pronator drift. No fasciculations, rigidity, spasticity or abnormal movements.   Sensation: Normal sensation to pin prick and light touch all four extremities   Station and gait: Normal gait  "  Coordination: Finger to nose test showed no dysmetria      Lab Results   Component Value Date    GLUCOSE 91 10/07/2024    BUN 9 10/07/2024    CREATININE 1.26 10/07/2024    EGFRIFNONA 46 (L) 11/03/2021    EGFRIFAFRI 53 (L) 11/03/2021    BCR 7.1 10/07/2024    CO2 25.9 10/07/2024    CALCIUM 8.8 10/07/2024    PROTENTOTREF 6.9 09/13/2024    ALBUMIN 3.9 10/06/2024    LABIL2 1.7 09/13/2024    AST 15 10/06/2024    ALT 16 10/06/2024       Lab Results   Component Value Date    WBC 5.69 10/07/2024    HGB 13.7 10/07/2024    HCT 41.4 10/07/2024    MCV 90.0 10/07/2024     10/07/2024         .No results found for: \"RPR\"      Lab Results   Component Value Date    TSH 0.989 04/03/2024         No results found for: \"[id number]\"      No results found for: \"FOLATE\"      Lab Results   Component Value Date    HGBA1C 5.7 (H) 02/24/2021         Lab Results   Component Value Date    GLUCOSE 91 10/07/2024    BUN 9 10/07/2024    CREATININE 1.26 10/07/2024    EGFRIFNONA 46 (L) 11/03/2021    EGFRIFAFRI 53 (L) 11/03/2021    BCR 7.1 10/07/2024    K 3.8 10/07/2024    CO2 25.9 10/07/2024    CALCIUM 8.8 10/07/2024    PROTENTOTREF 6.9 09/13/2024    ALBUMIN 3.9 10/06/2024    LABIL2 1.7 09/13/2024    AST 15 10/06/2024    ALT 16 10/06/2024         Lab Results   Component Value Date    WBC 5.69 10/07/2024    HGB 13.7 10/07/2024    HCT 41.4 10/07/2024    MCV 90.0 10/07/2024     10/07/2024       Results for orders placed during the hospital encounter of 12/30/24    CT Head Without Contrast    Narrative  EMERGENCY CT SCAN OF THE HEAD WITHOUT CONTRAST ON 12/30/2024    CLINICAL HISTORY: This is a 64-year-old male patient who has headaches.  The patient is on blood thinners    TECHNIQUE: Spiral CT images were obtained from the base of the skull to  the vertex without intravenous contrast. The images were reformatted and  are submitted in 3 mm thick axial, sagittal and coronal CT sections with  brain algorithm.    COMPARISON: This is correlated " to an MRI of the brain on 10/07/2024 and  a head CT on 10/07/2024.    FINDINGS: The brain parenchyma is normal in attenuation. The ventricles  are normal in size. I see no focal mass effect. There is no midline  shift. No extra-axial fluid collections are identified. There is no  evidence of acute intracranial hemorrhage. The calvarium and the skull  base are normal in appearance. The paranasal sinuses and the mastoid air  cells and middle ear cavities are clear. The orbits are normal in  appearance.    Impression  1. Normal head CT. The etiology of this patient's headaches is not  established on this exam.    Radiation dose reduction techniques were utilized, including automated  exposure control and exposure modulation based on body size.      This report was finalized on 12/31/2024 11:34 AM by Dr. Pietro Augustin M.D  on Workstation: [id number]      Results for orders placed during the hospital encounter of 10/06/24    CT Angiogram Neck    Narrative  Patient: RACHAEL RENDON  Time Out: 01:35  Exam(s): CTA NECK    EXAM:  CT Angiography Neck With Intravenous Contrast    CLINICAL HISTORY:  Reason for exam: dizziness; right eye nystagmus.    TECHNIQUE:  Routine carotid CT angiography protocol was performed with intravenous  contrast.  NASCET criteria using the distal ICAs for comparison were used  for evaluation of stenoses.  CTDI is 40.91 mGy and DLP is 1552.2 mGy-cm.  This CT exam was performed according to the principle of ALARA (As Low As  Reasonably Achievable) by using one or more of the following dose  reduction techniques: automated exposure control, adjustment of the mA  and or kV according to patient size, and or use of iterative  reconstruction technique.  MIP reconstructed images were created and reviewed.    COMPARISON:  None.    FINDINGS:    VASCULATURE:  Right common carotid artery:  No significant stenosis.  No dissection.  Right internal carotid artery:  No significant stenosis.  No  dissection.    Right vertebral artery:  No significant stenosis.  No dissection.    Left common carotid artery:  No significant stenosis.  No dissection.  Left internal carotid artery:  No significant stenosis.  No dissection.  Left vertebral artery:  No significant stenosis.  No dissection.    NECK:  Lung apices:  Clear.  Mild thyromegaly without discrete nodules.    CAROTID STENOSIS REFERENCE USING NASCET CRITERIA:  % ICA stenosis = (1 - narrowest ICA diameter diameter of distal  cervical ICA) x 100.  Mild - <50% stenosis.  Moderate - 50-69% stenosis.  Severe - 70-94% stenosis.  Near occlusion - 95-99% stenosis.  Occluded - 100% stenosis.    Impression  No significant stenosis.  Mild thyromegaly without discrete nodules.    IMPRESSION:      Electronically signed by Katarina Ashley MD on 10-07-24 at 0135      CT Angiogram Head    Narrative  Patient: RACHAEL RENDON  Time Out: 01:35  Exam(s): CTA HEAD    EXAM:  CT Angiography Head Without and With Intravenous Contrast    CLINICAL HISTORY:  Reason for exam: dizziness; right eye nystagmus.    TECHNIQUE:  AI analysis of LVO was utilized  Axial computed tomographic angiography images of the head without and  with intravenous contrast.  CTDI is 40.91 mGy and DLP is 1552.2 mGy-cm.  This CT exam was performed according to the principle of ALARA (As Low As  Reasonably Achievable) by using one or more of the following dose  reduction techniques: automated exposure control, adjustment of the mA  and or kV according to patient size, and or use of iterative  reconstruction technique.  MIP reconstructed images were created and reviewed.    COMPARISON:  No relevant prior studies available.    FINDINGS:    VASCULATURE:  Right internal carotid artery:  No significant stenosis.  No aneurysm.  Right anterior cerebral artery:  No significant stenosis.  No aneurysm.  Right middle cerebral artery:  No significant stenosis.  No aneurysm.  Right posterior cerebral artery:  No significant  stenosis.  No aneurysm.    Right vertebral artery:  Unremarkable.    Left internal carotid artery:  No significant stenosis.  No aneurysm.  Left anterior cerebral artery:  No significant stenosis.  No aneurysm.  Left middle cerebral artery:  No significant stenosis.  No aneurysm.  Left posterior cerebral artery:  No significant stenosis.  No aneurysm.  Left vertebral artery:  Unremarkable.    Basilar artery:  No significant stenosis.  No aneurysm.    HEAD:  Brain:  No hemorrhage.  No edema.  Normal enhancement.  Ventricles:  Unremarkable.  No ventriculomegaly.  Bones joints:  No acute fracture.  Soft tissues:  Unremarkable.  Sinuses:  Unremarkable.  No acute sinusitis.  Mastoid air cells:  Unremarkable.  No mastoid effusion.    Impression  No significant stenosis.    Brain: No acute hemorrhage, hydrocephalus, or herniation.    IMPRESSION:      Electronically signed by Katarina Ashley MD on 10-07-24 at 0135         LDL 69  TSH 0.98       Imaging review:   CT head without no acute hypodensity or hyperdensity     CTA head and neck no acute vessel stenosis atherosclerosis mild basilar fenestration     MRI brain no acute diffusion restriction no T2 flair changes no GRE changes     Diagnoses:  Chronic daily headache  Chronic migraine  History of dizziness  Basilar fenestration  Hypertension  Chronic DVT/PE on anticoagulation with Eliquis        Pre-stroke MRS: 0  NIHSS: 0     Plan   Patient's headaches main problem right now, his dizziness has resolved  We will start him on magnesium oxide 400 mg daily  Sumatriptan as needed as needed for acute headache  Will see him back in 3 months for his headaches     Diagnoses and all orders for this visit:    1. Chronic daily headache (Primary)    2. Intractable chronic migraine without aura and without status migrainosus    3. History of dizziness          For history of TIA/stroke  Continue antiplatelet/AC as prescribed.  HLD: Goal LDL <70, should continue surveillance labs and  management with PCP  HTN: goal of asymptomatic normotension. If elevated pt should reach out to PCP office, and if remains elevated at home go to urgent care and/or emergency room  DM: Continue monitoring of and control of blood sugars per PCP and/or endocrinology  Secondary stroke prevention: Ideal targets for stroke prevention would be Blood pressure < 130/80; LDL < 70; HbA1c < 6.5 and smoking cessation if applicable.  Call 911 for stroke symptoms      MDM   Reviewed: Previous charts, nursing notes and vitals   Reviewed: Previous labs and CT CTA/MRI scan    Interpretation: Labs and CT/CTA/MRI scan   Total time providing care is :30-74 minutes. This excluded time spent performing separately reportable procedures and services  Consults :Neurology/Stroke    Please note that portions of this note were completed with a voice recognition program.     Kirit Mendoza MD  Neuro Hospitalist /Vascular Neurology.                       Dictated utilizing Dragon dictation.

## 2025-01-15 ENCOUNTER — TELEPHONE (OUTPATIENT)
Dept: NEUROLOGY | Facility: CLINIC | Age: 65
End: 2025-01-15
Payer: COMMERCIAL

## 2025-01-15 DIAGNOSIS — G43.719 INTRACTABLE CHRONIC MIGRAINE WITHOUT AURA AND WITHOUT STATUS MIGRAINOSUS: ICD-10-CM

## 2025-01-15 DIAGNOSIS — R51.9 CHRONIC DAILY HEADACHE: Primary | ICD-10-CM

## 2025-01-15 RX ORDER — MAGNESIUM OXIDE 400 MG/1
400 TABLET ORAL DAILY
Qty: 30 TABLET | Refills: 11 | Status: SHIPPED | OUTPATIENT
Start: 2025-01-15

## 2025-01-15 RX ORDER — SUMATRIPTAN 50 MG/1
50 TABLET, FILM COATED ORAL ONCE AS NEEDED
Qty: 9 TABLET | Refills: 11 | Status: SHIPPED | OUTPATIENT
Start: 2025-01-15 | End: 2026-01-15

## 2025-01-15 NOTE — TELEPHONE ENCOUNTER
Spoke with Dr. Mendoza to verify dosing.  Prescriptions sent.     Called patient to inform him that prescriptions were sent.  Voicemail box not set up, unable to leave a message.

## 2025-01-15 NOTE — TELEPHONE ENCOUNTER
PATIENT CALLING, HE STATES AT YESTERDAY OV SOMETHING WAS TO BE PRESCRIBED FOR HIM.  HE HAS GONE TO IS RX AND THERE IS NOTHING THERE.    PLEASE ADVISE PATIENT REGARDING     RX IS CONFIRMED AS:    [id number] - [place], [place] - [address] AT [facility] [phone] [facility] [phone] FX

## 2025-02-17 ENCOUNTER — OFFICE VISIT (OUTPATIENT)
Dept: FAMILY MEDICINE CLINIC | Facility: CLINIC | Age: 65
End: 2025-02-17
Payer: COMMERCIAL

## 2025-02-17 VITALS
TEMPERATURE: 97.9 F | HEIGHT: 72 IN | RESPIRATION RATE: 16 BRPM | BODY MASS INDEX: 27.22 KG/M2 | HEART RATE: 68 BPM | DIASTOLIC BLOOD PRESSURE: 78 MMHG | WEIGHT: 201 LBS | SYSTOLIC BLOOD PRESSURE: 120 MMHG

## 2025-02-17 DIAGNOSIS — G43.809 OTHER MIGRAINE WITHOUT STATUS MIGRAINOSUS, NOT INTRACTABLE: ICD-10-CM

## 2025-02-17 DIAGNOSIS — R51.9 CHRONIC DAILY HEADACHE: Primary | ICD-10-CM

## 2025-02-17 PROBLEM — G43.909 MIGRAINE: Status: ACTIVE | Noted: 2025-02-17

## 2025-02-17 PROCEDURE — 99214 OFFICE O/P EST MOD 30 MIN: CPT | Performed by: FAMILY MEDICINE

## 2025-02-17 RX ORDER — BACLOFEN 20 MG/1
20 TABLET ORAL DAILY PRN
Qty: 30 TABLET | Refills: 5 | Status: SHIPPED | OUTPATIENT
Start: 2025-02-17

## 2025-02-17 NOTE — PROGRESS NOTES
Subjective   Kenrick Webster is a 64 y.o. male.     CC: ED f/u for HA    Patient returns today after being seen twice in the month of December, first visit to [facility] urgent care for sinus infection, and then a trip on 12/30/2024 to the emergency department with this note:    Context: Kenrick Webster is a 64 y.o. male with a medical history of DVT/PE on Eliquis, hypertension, hyperlipidemia who presents to the ED c/o acute headache.  Patient has had intermittent headaches for the past several months.  They are located on the right side of his head and neck and described as a throbbing sensation.  No associated nausea, vomiting, photophobia or phonophobia.  No associated weakness, numbness, visual changes or speech changes.  Patient was prescribed hydrocodone for the symptoms by his PCP and states that they will control him temporarily.  He does not believe he is had any imaging of his head since these headaches began.    CT of the head:  1. Normal head CT. The etiology of this patient's headaches is not established on this exam.     Informed patient of negative head CT.  Patient states he feels reassured.  Headache is improved after Fioricet.  He states that he will supposed to follow-up with a neurologist previously due to these headaches but missed the appointment.  He will reach out to establish another appointment.    Current outpatient and discharge medications have been reconciled for the patient.  Reviewed by: Galileo Urbina MD    Patient then saw his neurologist on 1/14/2025 with this note:    Initial consult note from hospital  Kenrick Webster is a 63 y.o. male past medical history of hypertension, hyperlipidemia, DVT/PE on chronic anticoagulation with Eliquis presented to the ER with dizziness.  Neurology has been consulted for the same.  Dizziness has been ongoing for since September intermittent episodes seems to coincide with head movements.  Like moving his head or bending down not associated with  "diaphoresis palpitations sweating.  Denies any vision problems but he had 2 episodes of dizziness in the last 2 days where his eyes were crossed.  Denies any  weakness numbness speech or vision problems present.  Denies any headache but patient states he is also been having shortness of breath and coughing spells since his COVID which come with mild headache.  He denies any room spinning sensation at present but states 1 of these dizziness spells he had room spinning sensation without any nausea vomiting.    Diagnoses and all orders for this visit:     1. Chronic daily headache (Primary)     2. Intractable chronic migraine without aura and without status migrainosus     3. History of dizziness    Plan   Patient's headaches main problem right now, his dizziness has resolved  We will start him on magnesium oxide 400 mg daily  Sumatriptan as needed as needed for acute headache  Will see him back in 3 months for his headaches    HAs are mainly left-sided (although can be bilateral) and in hat-band fashion. Reports a \"squeezing\" sensation.    The following portions of the patient's history were reviewed and updated as appropriate: allergies, current medications, past family history, past medical history, past social history, past surgical history, and problem list.    Review of Systems   Constitutional:  Negative for activity change, chills and fever.   Respiratory:  Positive for cough.    Cardiovascular:  Negative for chest pain.   Neurological:  Positive for headaches.   Psychiatric/Behavioral:  Negative for dysphoric mood.        /78   Pulse 68   Temp 97.9 °F (36.6 °C) (Oral)   Resp 16   Ht 182.9 cm (72\")   Wt 91.2 kg (201 lb)   BMI 27.26 kg/m²     Objective   Physical Exam  Vitals and nursing note reviewed.   Constitutional:       General: He is not in acute distress.     Appearance: He is well-developed.   HENT:      Head:     Pulmonary:      Effort: Pulmonary effort is normal.   Neurological:      " Mental Status: He is alert and oriented to person, place, and time.   Psychiatric:         Behavior: Behavior normal.         Thought Content: Thought content normal.     ED notes and neurology notes reviewed by me at today's visit.    Assessment & Plan   Diagnoses and all orders for this visit:    1. Chronic daily headache (Primary)  Comments:  not to goal; medication started; therapy added  Orders:  -     baclofen (LIORESAL) 20 MG tablet; Take 1 tablet by mouth Daily As Needed for Muscle Spasms (Headache).  Dispense: 30 tablet; Refill: 5    2. Other migraine without status migrainosus, not intractable    Heat to area/stretching/correct positioning and TENS Unit discussed.

## 2025-05-14 NOTE — PROGRESS NOTES
Chief Complaint:   Chief Complaint   Patient presents with    Hypertension    Hyperlipidemia    DVT    Headache     Increased headaches - to discuss dec 2024        Kenrick Webster 64 y.o. male who presents today for Medical Management of the below listed issues. He  has a problem list of   Patient Active Problem List   Diagnosis    DVT (deep venous thrombosis)    Benign essential hypertension    Renal insufficiency    Vitamin D deficiency    Hyperlipidemia    Gastroesophageal reflux disease without esophagitis    Moderate persistent asthma without complication    Other chest pain    Mass    Stress at home    Personal history of colonic polyps    Multiple subsegmental pulmonary emboli without acute cor pulmonale    Chronic daily headache    Migraine   .  Since the last visit, He has had some increase in some chronic headache issues since around December.  Patient has been under care of neurology for these previously, most recently seen in January, with a return to clinic in 3 months.  he has been compliant with   Current Outpatient Medications:     amLODIPine (NORVASC) 5 MG tablet, Take 1 tablet by mouth Daily., Disp: 90 tablet, Rfl: 1    apixaban (Eliquis) 5 MG tablet tablet, Take 1 tablet by mouth Every 12 (Twelve) Hours., Disp: 180 tablet, Rfl: 1    atorvastatin (LIPITOR) 10 MG tablet, Take 1 tablet by mouth Daily., Disp: 90 tablet, Rfl: 1    HYDROcodone-acetaminophen (NORCO) 5-325 MG per tablet, Take 1-2 tablets by mouth Every 6 (Six) Hours As Needed. for pain, Disp: , Rfl:     losartan (COZAAR) 50 MG tablet, Take 1 tablet by mouth Daily., Disp: 90 tablet, Rfl: 1    albuterol sulfate  (90 Base) MCG/ACT inhaler, Inhale 2 puffs Every 4 (Four) Hours As Needed for Wheezing or Shortness of Air., Disp: , Rfl:     Azelastine HCl 137 MCG/SPRAY solution, Administer 1 spray into the nostril(s) as directed by provider Daily As Needed (congestion)., Disp: , Rfl:     bacitracin 500 UNIT/GM ointment, Apply 1  "Application topically to the appropriate area as directed 2 (Two) Times a Day., Disp: 28 g, Rfl: 0    baclofen (LIORESAL) 20 MG tablet, Take 1 tablet by mouth Daily As Needed for Muscle Spasms (Headache)., Disp: 30 tablet, Rfl: 5    cholecalciferol (VITAMIN D3) 1000 UNITS tablet, Take 2 tablets by mouth Daily., Disp: , Rfl:     EPINEPHrine (EPIPEN) 0.3 MG/0.3ML solution auto-injector injection, INJECT 0.3 MILLILITER (0.3 MG) BY INTRAMUSCULAR ROUTE ONCE AS NEEDED FOR ANAPHYLAXIS, Disp: , Rfl:     famotidine (PEPCID) 20 MG tablet, Take 1 tablet by mouth Every 12 (Twelve) Hours., Disp: , Rfl:     fluticasone (FLONASE) 50 MCG/ACT nasal spray, Administer 1 spray into the nostril(s) as directed by provider Daily As Needed for Allergies., Disp: , Rfl:     magnesium oxide (MAG-OX) 400 MG tablet, Take 1 tablet by mouth Daily. Indications: headaches, Disp: 30 tablet, Rfl: 11    meclizine (ANTIVERT) 25 MG tablet, Take 1 tablet by mouth 3 (Three) Times a Day As Needed for Dizziness., Disp: , Rfl:     SUMAtriptan (Imitrex) 50 MG tablet, Take 1 tablet by mouth 1 (One) Time As Needed for Migraine. Take one tablet at onset of headache. May repeat dose one time in 2 hours if headache not relieved., Disp: 9 tablet, Rfl: 11    triamcinolone (KENALOG) 0.1 % ointment, Apply 1 Application topically to the appropriate area as directed 2 (Two) Times a Day., Disp: 30 g, Rfl: 0.  He denies medication side effects.    All of the other chronic condition(s) listed above are stable w/o issues.    /74   Pulse 80   Temp 97.9 °F (36.6 °C) (Oral)   Resp 16   Ht 182.9 cm (72\")   Wt 91 kg (200 lb 9.6 oz)   SpO2 98%   BMI 27.21 kg/m²     Results for orders placed or performed during the hospital encounter of 12/01/24   POCT SARS-CoV-2 Antigen    Collection Time: 12/01/24 11:24 AM    Specimen: Nasopharynx; Swab   Result Value Ref Range    SARS Antigen Not Detected Not Detected, Presumptive Negative    Internal Control Passed Passed    Lot " Number [id number]     Expiration Date 05/06/25              The following portions of the patient's history were reviewed and updated as appropriate: allergies, current medications, past family history, past medical history, past social history, past surgical history, and problem list.    Review of Systems   Constitutional:  Negative for activity change, chills and fever.   Respiratory:  Negative for cough.    Cardiovascular:  Negative for chest pain.   Neurological:  Positive for headaches.   Psychiatric/Behavioral:  Negative for dysphoric mood.        Objective             Physical Exam  Vitals and nursing note reviewed.   Constitutional:       General: He is not in acute distress.     Appearance: He is well-developed.   Cardiovascular:      Rate and Rhythm: Normal rate and regular rhythm.   Pulmonary:      Effort: Pulmonary effort is normal.      Breath sounds: Normal breath sounds.   Neurological:      Mental Status: He is alert and oriented to person, place, and time.   Psychiatric:         Behavior: Behavior normal.         Thought Content: Thought content normal.             Diagnoses and all orders for this visit:    1. Benign essential hypertension (Primary)  -     amLODIPine (NORVASC) 5 MG tablet; Take 1 tablet by mouth Daily.  Dispense: 90 tablet; Refill: 1  -     atorvastatin (LIPITOR) 10 MG tablet; Take 1 tablet by mouth Daily.  Dispense: 90 tablet; Refill: 1  -     losartan (COZAAR) 50 MG tablet; Take 1 tablet by mouth Daily.  Dispense: 90 tablet; Refill: 1  -     Comprehensive metabolic panel  -     Lipid panel  -     CBC and Differential  -     TSH    2. Multiple subsegmental pulmonary emboli without acute cor pulmonale  -     apixaban (Eliquis) 5 MG tablet tablet; Take 1 tablet by mouth Every 12 (Twelve) Hours.  Dispense: 180 tablet; Refill: 1    3. Mixed hyperlipidemia  -     atorvastatin (LIPITOR) 10 MG tablet; Take 1 tablet by mouth Daily.  Dispense: 90 tablet; Refill: 1  -     Lipid panel    4.  Chronic daily headache  -     Ambulatory Referral to Neurology

## 2025-05-15 ENCOUNTER — OFFICE VISIT (OUTPATIENT)
Dept: FAMILY MEDICINE CLINIC | Facility: CLINIC | Age: 65
End: 2025-05-15
Payer: COMMERCIAL

## 2025-05-15 VITALS
WEIGHT: 200.6 LBS | BODY MASS INDEX: 27.17 KG/M2 | TEMPERATURE: 97.9 F | OXYGEN SATURATION: 98 % | HEIGHT: 72 IN | HEART RATE: 80 BPM | RESPIRATION RATE: 16 BRPM | DIASTOLIC BLOOD PRESSURE: 74 MMHG | SYSTOLIC BLOOD PRESSURE: 108 MMHG

## 2025-05-15 DIAGNOSIS — E78.2 MIXED HYPERLIPIDEMIA: Chronic | ICD-10-CM

## 2025-05-15 DIAGNOSIS — I10 BENIGN ESSENTIAL HYPERTENSION: Primary | Chronic | ICD-10-CM

## 2025-05-15 DIAGNOSIS — R51.9 CHRONIC DAILY HEADACHE: ICD-10-CM

## 2025-05-15 DIAGNOSIS — I26.94 MULTIPLE SUBSEGMENTAL PULMONARY EMBOLI WITHOUT ACUTE COR PULMONALE: Chronic | ICD-10-CM

## 2025-05-15 PROCEDURE — 99214 OFFICE O/P EST MOD 30 MIN: CPT | Performed by: FAMILY MEDICINE

## 2025-05-15 RX ORDER — LOSARTAN POTASSIUM 50 MG/1
50 TABLET ORAL DAILY
Qty: 90 TABLET | Refills: 1 | Status: SHIPPED | OUTPATIENT
Start: 2025-05-15

## 2025-05-15 RX ORDER — ATORVASTATIN CALCIUM 10 MG/1
10 TABLET, FILM COATED ORAL DAILY
Qty: 90 TABLET | Refills: 1 | Status: SHIPPED | OUTPATIENT
Start: 2025-05-15

## 2025-05-15 RX ORDER — HYDROCODONE BITARTRATE AND ACETAMINOPHEN 5; 325 MG/1; MG/1
1-2 TABLET ORAL EVERY 6 HOURS PRN
COMMUNITY
Start: 2025-04-18

## 2025-05-15 RX ORDER — AMLODIPINE BESYLATE 5 MG/1
5 TABLET ORAL DAILY
Qty: 90 TABLET | Refills: 1 | Status: SHIPPED | OUTPATIENT
Start: 2025-05-15

## 2025-05-17 LAB
ALBUMIN SERPL-MCNC: 4.2 G/DL (ref 3.5–5.2)
ALBUMIN/GLOB SERPL: 1.8 G/DL
ALP SERPL-CCNC: 88 U/L (ref 39–117)
ALT SERPL-CCNC: 22 U/L (ref 1–41)
AST SERPL-CCNC: 30 U/L (ref 1–40)
BASOPHILS # BLD AUTO: 0.05 10*3/MM3 (ref 0–0.2)
BASOPHILS NFR BLD AUTO: 0.8 % (ref 0–1.5)
BILIRUB SERPL-MCNC: 1.7 MG/DL (ref 0–1.2)
BUN SERPL-MCNC: 13 MG/DL (ref 8–23)
BUN/CREAT SERPL: 8.9 (ref 7–25)
CALCIUM SERPL-MCNC: 9.2 MG/DL (ref 8.6–10.5)
CHLORIDE SERPL-SCNC: 104 MMOL/L (ref 98–107)
CHOLEST SERPL-MCNC: 159 MG/DL (ref 0–200)
CO2 SERPL-SCNC: 26.3 MMOL/L (ref 22–29)
CREAT SERPL-MCNC: 1.46 MG/DL (ref 0.76–1.27)
EGFRCR SERPLBLD CKD-EPI 2021: 54 ML/MIN/1.73
EOSINOPHIL # BLD AUTO: 0.23 10*3/MM3 (ref 0–0.4)
EOSINOPHIL NFR BLD AUTO: 3.5 % (ref 0.3–6.2)
ERYTHROCYTE [DISTWIDTH] IN BLOOD BY AUTOMATED COUNT: 13.2 % (ref 12.3–15.4)
GLOBULIN SER CALC-MCNC: 2.3 GM/DL
GLUCOSE SERPL-MCNC: 84 MG/DL (ref 65–99)
HCT VFR BLD AUTO: 43.4 % (ref 37.5–51)
HDLC SERPL-MCNC: 69 MG/DL (ref 40–60)
HGB BLD-MCNC: 13.9 G/DL (ref 13–17.7)
IMM GRANULOCYTES # BLD AUTO: 0.01 10*3/MM3 (ref 0–0.05)
IMM GRANULOCYTES NFR BLD AUTO: 0.2 % (ref 0–0.5)
LDLC SERPL CALC-MCNC: 80 MG/DL (ref 0–100)
LYMPHOCYTES # BLD AUTO: 1.78 10*3/MM3 (ref 0.7–3.1)
LYMPHOCYTES NFR BLD AUTO: 27.4 % (ref 19.6–45.3)
MCH RBC QN AUTO: 29.3 PG (ref 26.6–33)
MCHC RBC AUTO-ENTMCNC: 32 G/DL (ref 31.5–35.7)
MCV RBC AUTO: 91.6 FL (ref 79–97)
MONOCYTES # BLD AUTO: 0.59 10*3/MM3 (ref 0.1–0.9)
MONOCYTES NFR BLD AUTO: 9.1 % (ref 5–12)
NEUTROPHILS # BLD AUTO: 3.84 10*3/MM3 (ref 1.7–7)
NEUTROPHILS NFR BLD AUTO: 59 % (ref 42.7–76)
NRBC BLD AUTO-RTO: 0 /100 WBC (ref 0–0.2)
PLATELET # BLD AUTO: 278 10*3/MM3 (ref 140–450)
POTASSIUM SERPL-SCNC: 3.8 MMOL/L (ref 3.5–5.2)
PROT SERPL-MCNC: 6.5 G/DL (ref 6–8.5)
RBC # BLD AUTO: 4.74 10*6/MM3 (ref 4.14–5.8)
SODIUM SERPL-SCNC: 141 MMOL/L (ref 136–145)
TRIGL SERPL-MCNC: 49 MG/DL (ref 0–150)
TSH SERPL DL<=0.005 MIU/L-ACNC: 0.76 UIU/ML (ref 0.27–4.2)
VLDLC SERPL CALC-MCNC: 10 MG/DL (ref 5–40)
WBC # BLD AUTO: 6.5 10*3/MM3 (ref 3.4–10.8)

## 2025-06-04 ENCOUNTER — OFFICE VISIT (OUTPATIENT)
Dept: NEUROLOGY | Facility: CLINIC | Age: 65
End: 2025-06-04
Payer: COMMERCIAL

## 2025-06-04 VITALS
SYSTOLIC BLOOD PRESSURE: 118 MMHG | WEIGHT: 203 LBS | BODY MASS INDEX: 27.53 KG/M2 | OXYGEN SATURATION: 97 % | DIASTOLIC BLOOD PRESSURE: 78 MMHG | HEART RATE: 72 BPM

## 2025-06-04 DIAGNOSIS — R51.9 CHRONIC DAILY HEADACHE: ICD-10-CM

## 2025-06-04 DIAGNOSIS — G43.E09 CHRONIC MIGRAINE WITH AURA WITHOUT STATUS MIGRAINOSUS, NOT INTRACTABLE: Primary | ICD-10-CM

## 2025-06-04 RX ORDER — NORTRIPTYLINE HYDROCHLORIDE 25 MG/1
25 CAPSULE ORAL NIGHTLY
Qty: 30 CAPSULE | Refills: 2 | Status: SHIPPED | OUTPATIENT
Start: 2025-06-04 | End: 2026-06-04

## 2025-06-04 NOTE — PROGRESS NOTES
CC: Headache and Dizziness    Interval History 6/4/24  Today morning is in a happy mood denies any new weakness numbness speech or vision problem  Patient states his headaches are not better with magnesium oxide or Imitrex he was present  He states that every time he does push-ups or has severe cough he gets headache  Headaches are only associated with push-ups or coughing otherwise he does not have any headache  He denies any thunderclap headache features or temporal tenderness or jaw claudication or vision  The headache last couple of minutes and goes away      Interval history 1/14/2025  Patient states he is doing reasonably well denies any new weakness numbness speech or vision  Patient states he no longer has dizziness he feels much better after that hospital admission  He states he has been having every day headaches left temporal with some nausea denies any visual aura or any visual symptoms  Denies any headache with temporal tenderness or jaw claudication  He was recently in the ER 12/30/2024 for headaches he was prescribed hydrocodone as needed and discharged back home     Initial consult note from hospital  Kenrick Webster is a 63 y.o. male past medical history of hypertension, hyperlipidemia, DVT/PE on chronic anticoagulation with Eliquis presented to the ER with dizziness.  Neurology has been consulted for the same.  Dizziness has been ongoing for since September intermittent episodes seems to coincide with head movements.  Like moving his head or bending down not associated with diaphoresis palpitations sweating.  Denies any vision problems but he had 2 episodes of dizziness in the last 2 days where his eyes were crossed.  Denies any  weakness numbness speech or vision problems present.  Denies any headache but patient states he is also been having shortness of breath and coughing spells since his COVID which come with mild headache.  He denies any room spinning sensation at present but states 1 of  these dizziness spells he had room spinning sensation without any nausea vomiting.            Past Medical History:   Diagnosis Date    Anxiety and depression     Back pain     Benign essential hypertension     Bursitis     Chest pain     Colon polyp     DUNCAN (dyspnea on exertion)     DVT (deep venous thrombosis)     Elevated cholesterol     Fatigue     H/O complete eye exam scheduled    Hyperlipidemia     Mass     on top of sternum    Renal insufficiency     Vitamin D deficiency          Past Surgical History:   Procedure Laterality Date    APPENDECTOMY      COLONOSCOPY      ENDOSCOPY      OTHER SURGICAL HISTORY      gallbladder testing or surgery    ROTATOR CUFF REPAIR             Current Outpatient Medications:     amLODIPine (NORVASC) 5 MG tablet, Take 1 tablet by mouth Daily., Disp: 90 tablet, Rfl: 1    apixaban (Eliquis) 5 MG tablet tablet, Take 1 tablet by mouth Every 12 (Twelve) Hours., Disp: 180 tablet, Rfl: 1    atorvastatin (LIPITOR) 10 MG tablet, Take 1 tablet by mouth Daily., Disp: 90 tablet, Rfl: 1    Azelastine HCl 137 MCG/SPRAY solution, Administer 1 spray into the nostril(s) as directed by provider Daily As Needed (congestion)., Disp: , Rfl:     bacitracin 500 UNIT/GM ointment, Apply 1 Application topically to the appropriate area as directed 2 (Two) Times a Day., Disp: 28 g, Rfl: 0    baclofen (LIORESAL) 20 MG tablet, Take 1 tablet by mouth Daily As Needed for Muscle Spasms (Headache)., Disp: 30 tablet, Rfl: 5    cholecalciferol (VITAMIN D3) 1000 UNITS tablet, Take 2 tablets by mouth Daily., Disp: , Rfl:     EPINEPHrine (EPIPEN) 0.3 MG/0.3ML solution auto-injector injection, INJECT 0.3 MILLILITER (0.3 MG) BY INTRAMUSCULAR ROUTE ONCE AS NEEDED FOR ANAPHYLAXIS, Disp: , Rfl:     famotidine (PEPCID) 20 MG tablet, Take 1 tablet by mouth Every 12 (Twelve) Hours., Disp: , Rfl:     fluticasone (FLONASE) 50 MCG/ACT nasal spray, Administer 1 spray into the nostril(s) as directed by provider Daily As Needed  for Allergies., Disp: , Rfl:     HYDROcodone-acetaminophen (NORCO) 5-325 MG per tablet, Take 1-2 tablets by mouth Every 6 (Six) Hours As Needed. for pain, Disp: , Rfl:     losartan (COZAAR) 50 MG tablet, Take 1 tablet by mouth Daily., Disp: 90 tablet, Rfl: 1    meclizine (ANTIVERT) 25 MG tablet, Take 1 tablet by mouth 3 (Three) Times a Day As Needed for Dizziness., Disp: , Rfl:     triamcinolone (KENALOG) 0.1 % ointment, Apply 1 Application topically to the appropriate area as directed 2 (Two) Times a Day., Disp: 30 g, Rfl: 0    albuterol sulfate  (90 Base) MCG/ACT inhaler, Inhale 2 puffs Every 4 (Four) Hours As Needed for Wheezing or Shortness of Air., Disp: , Rfl:     nortriptyline (Pamelor) 25 MG capsule, Take 1 capsule by mouth Every Night., Disp: 30 capsule, Rfl: 2      Family History   Problem Relation Age of Onset    Heart disease Father     Stroke Sister     Hypertension Sister     Kidney disease Sister     Diabetes Brother          Social History     Socioeconomic History    Marital status: Single   Tobacco Use    Smoking status: Never     Passive exposure: Never    Smokeless tobacco: Never   Vaping Use    Vaping status: Never Used   Substance and Sexual Activity    Alcohol use: No    Drug use: No    Sexual activity: Defer         No Known Allergies      Pain Scale:        ROS:    Constitutional: [No fevers, chills, sweats or weight loss/gain]   Eye: [No change in vision, double vision, or loss of vision]   HEENT: [No headaches, tenderness, dizziness, or tinnitus. Normal smell and taste. Normal speech and swallowing]   Respiratory: [No shortness of breath, coughing, wheezing]   Cardiovascular: [No Chest pain, palpitations, syncope, DUNCAN]   Gastrointestinal: [Normal bowel function. No nausea, vomiting, diarrhea]   Genitourinary: [Normal bladder function]   Musculoskeletal: [No trauma, joint or neck pain, myalgias, cramping or weakness]   Skin: [No itching, burning, pain, rashes, or birthmarks]  "  Endocrinology: [No heat or cold intolerance]   Psychiatric: [No sleep disturbance. No anxiety or depression]   Neurologic: [See HPI, above]       Physical Exam:  Vitals:    06/04/25 0951   BP: 118/78   Pulse: 72   SpO2: 97%   Weight: 92.1 kg (203 lb)     Orthostatic BP:    Body mass index is 27.53 kg/m².    General appearance: Well developed, well nourished, well groomed, alert and cooperative.   HEENT: Normocephalic.   Cardiac: Regular rate and rhythm. No murmurs.   Chest Exam: Clear to auscultation bilaterally, no wheezes, no rhonchi.  Extremities: Normal, no edema.   Skin: No rashes or birthmarks.      Higher integrative function: Oriented to time, place, person, intact recent and remote memory, attention span, concentration and language. Spontaneous speech, fund of vocabulary are normal.   CN II: Normal visual acuity   CN III IV VI: Extraocular movements are full without nystagmus. Pupils are equal, round, and reactive to light.   CN V: Sensation same on both sides of the face  CN VII: Facial movements are symmetric, no weakness.   CN XII: The tongue is midline. No atrophy or fasciculations.   Motor: Good strength 5/5 both UE and LE B/L  no pronator drift. No fasciculations, rigidity, spasticity or abnormal movements.   Sensation: Normal sensation to pin prick and light touch all four extremities   Station and gait: Normal gait   Coordination: Finger to nose test showed no dysmetria      Lab Results   Component Value Date    GLUCOSE 84 05/16/2025    BUN 13 05/16/2025    CREATININE 1.46 (H) 05/16/2025    EGFRIFNONA 46 (L) 11/03/2021    EGFRIFAFRI 53 (L) 11/03/2021    BCR 8.9 05/16/2025    CO2 26.3 05/16/2025    CALCIUM 9.2 05/16/2025    ALBUMIN 4.2 05/16/2025    AST 30 05/16/2025    ALT 22 05/16/2025       Lab Results   Component Value Date    WBC 6.50 05/16/2025    HGB 13.9 05/16/2025    HCT 43.4 05/16/2025    MCV 91.6 05/16/2025     05/16/2025         .No results found for: \"RPR\"      Lab Results " "  Component Value Date    TSH 0.756 05/16/2025         No results found for: \"[id number]\"      No results found for: \"FOLATE\"      Lab Results   Component Value Date    HGBA1C 5.7 (H) 02/24/2021         Lab Results   Component Value Date    GLUCOSE 84 05/16/2025    BUN 13 05/16/2025    CREATININE 1.46 (H) 05/16/2025    EGFRIFNONA 46 (L) 11/03/2021    EGFRIFAFRI 53 (L) 11/03/2021    BCR 8.9 05/16/2025    K 3.8 05/16/2025    CO2 26.3 05/16/2025    CALCIUM 9.2 05/16/2025    ALBUMIN 4.2 05/16/2025    AST 30 05/16/2025    ALT 22 05/16/2025         Lab Results   Component Value Date    WBC 6.50 05/16/2025    HGB 13.9 05/16/2025    HCT 43.4 05/16/2025    MCV 91.6 05/16/2025     05/16/2025       Results for orders placed during the hospital encounter of 12/30/24    CT Head Without Contrast    Narrative  EMERGENCY CT SCAN OF THE HEAD WITHOUT CONTRAST ON 12/30/2024    CLINICAL HISTORY: This is a 64-year-old male patient who has headaches.  The patient is on blood thinners    TECHNIQUE: Spiral CT images were obtained from the base of the skull to  the vertex without intravenous contrast. The images were reformatted and  are submitted in 3 mm thick axial, sagittal and coronal CT sections with  brain algorithm.    COMPARISON: This is correlated to an MRI of the brain on 10/07/2024 and  a head CT on 10/07/2024.    FINDINGS: The brain parenchyma is normal in attenuation. The ventricles  are normal in size. I see no focal mass effect. There is no midline  shift. No extra-axial fluid collections are identified. There is no  evidence of acute intracranial hemorrhage. The calvarium and the skull  base are normal in appearance. The paranasal sinuses and the mastoid air  cells and middle ear cavities are clear. The orbits are normal in  appearance.    Impression  1. Normal head CT. The etiology of this patient's headaches is not  established on this exam.    Radiation dose reduction techniques were utilized, including " automated  exposure control and exposure modulation based on body size.      This report was finalized on 12/31/2024 11:34 AM by Dr. Pietro Augustin M.D  on Workstation: [id number]      Results for orders placed during the hospital encounter of 10/06/24    CT Angiogram Neck    Narrative  Patient: RACHAEL RENDON  Time Out: 01:35  Exam(s): CTA NECK    EXAM:  CT Angiography Neck With Intravenous Contrast    CLINICAL HISTORY:  Reason for exam: dizziness; right eye nystagmus.    TECHNIQUE:  Routine carotid CT angiography protocol was performed with intravenous  contrast.  NASCET criteria using the distal ICAs for comparison were used  for evaluation of stenoses.  CTDI is 40.91 mGy and DLP is 1552.2 mGy-cm.  This CT exam was performed according to the principle of ALARA (As Low As  Reasonably Achievable) by using one or more of the following dose  reduction techniques: automated exposure control, adjustment of the mA  and or kV according to patient size, and or use of iterative  reconstruction technique.  MIP reconstructed images were created and reviewed.    COMPARISON:  None.    FINDINGS:    VASCULATURE:  Right common carotid artery:  No significant stenosis.  No dissection.  Right internal carotid artery:  No significant stenosis.  No dissection.    Right vertebral artery:  No significant stenosis.  No dissection.    Left common carotid artery:  No significant stenosis.  No dissection.  Left internal carotid artery:  No significant stenosis.  No dissection.  Left vertebral artery:  No significant stenosis.  No dissection.    NECK:  Lung apices:  Clear.  Mild thyromegaly without discrete nodules.    CAROTID STENOSIS REFERENCE USING NASCET CRITERIA:  % ICA stenosis = (1 - narrowest ICA diameter diameter of distal  cervical ICA) x 100.  Mild - <50% stenosis.  Moderate - 50-69% stenosis.  Severe - 70-94% stenosis.  Near occlusion - 95-99% stenosis.  Occluded - 100% stenosis.    Impression  No significant stenosis.  Mild  thyromegaly without discrete nodules.    IMPRESSION:      Electronically signed by Katarina Ashley MD on 10-07-24 at 0135      CT Angiogram Head    Narrative  Patient: RACHAEL RENDON  Time Out: 01:35  Exam(s): CTA HEAD    EXAM:  CT Angiography Head Without and With Intravenous Contrast    CLINICAL HISTORY:  Reason for exam: dizziness; right eye nystagmus.    TECHNIQUE:  AI analysis of LVO was utilized  Axial computed tomographic angiography images of the head without and  with intravenous contrast.  CTDI is 40.91 mGy and DLP is 1552.2 mGy-cm.  This CT exam was performed according to the principle of ALARA (As Low As  Reasonably Achievable) by using one or more of the following dose  reduction techniques: automated exposure control, adjustment of the mA  and or kV according to patient size, and or use of iterative  reconstruction technique.  MIP reconstructed images were created and reviewed.    COMPARISON:  No relevant prior studies available.    FINDINGS:    VASCULATURE:  Right internal carotid artery:  No significant stenosis.  No aneurysm.  Right anterior cerebral artery:  No significant stenosis.  No aneurysm.  Right middle cerebral artery:  No significant stenosis.  No aneurysm.  Right posterior cerebral artery:  No significant stenosis.  No aneurysm.    Right vertebral artery:  Unremarkable.    Left internal carotid artery:  No significant stenosis.  No aneurysm.  Left anterior cerebral artery:  No significant stenosis.  No aneurysm.  Left middle cerebral artery:  No significant stenosis.  No aneurysm.  Left posterior cerebral artery:  No significant stenosis.  No aneurysm.  Left vertebral artery:  Unremarkable.    Basilar artery:  No significant stenosis.  No aneurysm.    HEAD:  Brain:  No hemorrhage.  No edema.  Normal enhancement.  Ventricles:  Unremarkable.  No ventriculomegaly.  Bones joints:  No acute fracture.  Soft tissues:  Unremarkable.  Sinuses:  Unremarkable.  No acute sinusitis.  Mastoid air  cells:  Unremarkable.  No mastoid effusion.    Impression  No significant stenosis.    Brain: No acute hemorrhage, hydrocephalus, or herniation.    IMPRESSION:      Electronically signed by Katarina Ashley MD on 10-07-24 at 0135           LDL 69  TSH 0.98        Imaging review:   CT head without no acute hypodensity or hyperdensity     CTA head and neck no acute vessel stenosis atherosclerosis mild basilar fenestration     MRI brain no acute diffusion restriction no T2 flair changes no GRE changes     Diagnoses:  Chronic daily headache  Chronic migraine  History of dizziness  Basilar fenestration  Hypertension  Chronic DVT/PE on anticoagulation with Eliquis        Pre-stroke MRS: 0  NIHSS: 0     Plan   Patient's headaches main problem right now, his dizziness has resolved  Magnesium oxide is not helping so discontinued  Try nortriptyline 25 mg at night, do not take it with EpiPen                      Diagnoses and all orders for this visit:    1. Chronic migraine with aura without status migrainosus, not intractable (Primary)  -     nortriptyline (Pamelor) 25 MG capsule; Take 1 capsule by mouth Every Night.  Dispense: 30 capsule; Refill: 2    2. Chronic daily headache  -     nortriptyline (Pamelor) 25 MG capsule; Take 1 capsule by mouth Every Night.  Dispense: 30 capsule; Refill: 2          For history of TIA/stroke  Continue antiplatelet/AC as prescribed.  HLD: Goal LDL <70, should continue surveillance labs and management with PCP  HTN: goal of asymptomatic normotension. If elevated pt should reach out to PCP office, and if remains elevated at home go to urgent care and/or emergency room  DM: Continue monitoring of and control of blood sugars per PCP and/or endocrinology  Secondary stroke prevention: Ideal targets for stroke prevention would be Blood pressure < 130/80; LDL < 70; HbA1c < 6.5 and smoking cessation if applicable.  Call 911 for stroke symptoms      MDM   Reviewed: Previous charts, nursing notes and  vitals   Reviewed: Previous labs and CT CTA/MRI scan    Interpretation: Labs and CT/CTA/MRI scan   Total time providing care is :30-74 minutes. This excluded time spent performing separately reportable procedures and services  Consults :Neurology/Stroke    Please note that portions of this note were completed with a voice recognition program.     Kirit Mendoza MD  Neuro Hospitalist /Vascular Neurology.                       Dictated utilizing Dragon dictation.

## 2025-06-18 ENCOUNTER — TELEPHONE (OUTPATIENT)
Dept: NEUROLOGY | Facility: CLINIC | Age: 65
End: 2025-06-18

## 2025-06-18 NOTE — TELEPHONE ENCOUNTER
Caller: Kenrick Webster    Relationship: Self    Best call back number: [phone]     What is the best time to reach you: AFTER 9AM    Who are you requesting to speak with (clinical staff, provider,  specific staff member): DR STEWART    Do you know the name of the person who called: PATIENT    What was the call regarding: PT STATES THE NEW MEDICATION HAS BEEN HELPING A LOT. THERE HAVE BEEN SOME LINGERING HEADACHES BUT A LOT OF IMPROVEMENT.      PLEASE ADVISE  THANK YOU

## 2025-06-30 DIAGNOSIS — R51.9 CHRONIC DAILY HEADACHE: ICD-10-CM

## 2025-06-30 DIAGNOSIS — G43.E09 CHRONIC MIGRAINE WITH AURA WITHOUT STATUS MIGRAINOSUS, NOT INTRACTABLE: ICD-10-CM

## 2025-06-30 RX ORDER — NORTRIPTYLINE HYDROCHLORIDE 25 MG/1
25 CAPSULE ORAL NIGHTLY
Qty: 90 CAPSULE | Refills: 1 | Status: SHIPPED | OUTPATIENT
Start: 2025-06-30 | End: 2026-06-30

## 2025-07-01 ENCOUNTER — OFFICE VISIT (OUTPATIENT)
Dept: CARDIOLOGY | Facility: CLINIC | Age: 65
End: 2025-07-01
Payer: COMMERCIAL

## 2025-07-01 VITALS
HEART RATE: 86 BPM | WEIGHT: 200 LBS | SYSTOLIC BLOOD PRESSURE: 128 MMHG | HEIGHT: 72 IN | BODY MASS INDEX: 27.09 KG/M2 | OXYGEN SATURATION: 98 % | DIASTOLIC BLOOD PRESSURE: 84 MMHG

## 2025-07-01 DIAGNOSIS — I10 BENIGN ESSENTIAL HYPERTENSION: Primary | ICD-10-CM

## 2025-07-01 PROCEDURE — 99213 OFFICE O/P EST LOW 20 MIN: CPT | Performed by: INTERNAL MEDICINE

## 2025-07-01 NOTE — PROGRESS NOTES
"      CARDIOLOGY    Timothy Marrero MD    ENCOUNTER DATE:  07/01/2025    Kenrick Webster / 64 y.o. / male        CHIEF COMPLAINT / REASON FOR OFFICE VISIT     Short of breath on exertion (11/02/2023 Follow up)  Hypertension    HISTORY OF PRESENT ILLNESS       HPI  Kenrick Webster is a 64 y.o. male who presents today for reevaluation.  Patient says he is doing great he has not had any further chest discomfort he says his breathing is great.  He is not having shortness of breath with exertion he has no limitations of what he wants to do.      The following portions of the patient's history were reviewed and updated as appropriate: allergies, current medications, past family history, past medical history, past social history, past surgical history and problem list.      VITAL SIGNS     Visit Vitals  /84 (BP Location: Left arm)   Pulse 86   Ht 182.9 cm (72\")   Wt 90.7 kg (200 lb)   SpO2 98%   BMI 27.12 kg/m²         Wt Readings from Last 3 Encounters:   07/01/25 90.7 kg (200 lb)   06/04/25 92.1 kg (203 lb)   05/15/25 91 kg (200 lb 9.6 oz)     Body mass index is 27.12 kg/m².      REVIEW OF SYSTEMS   ROS        PHYSICAL EXAMINATION     Vitals reviewed.   Constitutional:       Appearance: Healthy appearance.   Pulmonary:      Effort: Pulmonary effort is normal.   Cardiovascular:      Normal rate. Regular rhythm. Normal S1. Normal S2.       Murmurs: There is no murmur.      No gallop.  No click. No rub.   Pulses:     Intact distal pulses.   Edema:     Peripheral edema absent.           REVIEWED DATA     Procedures    Cardiac Procedures:      Lipid Panel          5/16/2025    10:16   Lipid Panel   Total Cholesterol 159    Triglycerides 49    HDL Cholesterol 69    VLDL Cholesterol 10    LDL Cholesterol  80          ASSESSMENT & PLAN      Diagnosis Plan   1. Benign essential hypertension              SUMMARY/DISCUSSION  Hypertension blood pressure is excellent  Shortness of breath.  This is all completely resolved. "  In light of that I would continue the same follow-up in about 2 years sooner if issues should arise.        MEDICATIONS         Discharge Medications            Accurate as of July 1, 2025  2:25 PM. If you have any questions, ask your nurse or doctor.                Continue These Medications        Instructions Start Date   albuterol sulfate  (90 Base) MCG/ACT inhaler  Commonly known as: PROVENTIL HFA;VENTOLIN HFA;PROAIR HFA   2 puffs, Every 4 Hours PRN      amLODIPine 5 MG tablet  Commonly known as: NORVASC   5 mg, Oral, Daily      apixaban 5 MG tablet tablet  Commonly known as: Eliquis   5 mg, Oral, Every 12 Hours Scheduled      atorvastatin 10 MG tablet  Commonly known as: LIPITOR   10 mg, Oral, Daily      Azelastine HCl 137 MCG/SPRAY solution   Administer 1 spray into the nostril(s) as directed by provider Daily As Needed (congestion).      bacitracin 500 UNIT/GM ointment   0.9 g, Topical, 2 Times Daily      baclofen 20 MG tablet  Commonly known as: LIORESAL   20 mg, Oral, Daily PRN      cholecalciferol 25 MCG (1000 UT) tablet  Commonly known as: VITAMIN D3   2,000 Units, Daily      EPINEPHrine 0.3 MG/0.3ML solution auto-injector injection  Commonly known as: EPIPEN   INJECT 0.3 MILLILITER (0.3 MG) BY INTRAMUSCULAR ROUTE ONCE AS NEEDED FOR ANAPHYLAXIS      famotidine 20 MG tablet  Commonly known as: PEPCID   1 tablet, Every 12 Hours Scheduled      fluticasone 50 MCG/ACT nasal spray  Commonly known as: FLONASE   Administer 1 spray into the nostril(s) as directed by provider Daily As Needed for Allergies.      HYDROcodone-acetaminophen 5-325 MG per tablet  Commonly known as: NORCO   1-2 tablets, Every 6 Hours PRN      losartan 50 MG tablet  Commonly known as: COZAAR   50 mg, Oral, Daily      meclizine 25 MG tablet  Commonly known as: ANTIVERT   25 mg, 3 Times Daily PRN      nortriptyline 25 MG capsule  Commonly known as: Pamelor   25 mg, Oral, Nightly      triamcinolone 0.1 % ointment  Commonly known as:  KENALOG   1 Application, Topical, 2 Times Daily                   **Dragon Disclaimer:   Much of this encounter note is an electronic transcription/translation of spoken language to printed text. The electronic translation of spoken language may permit erroneous, or at times, nonsensical words or phrases to be inadvertently transcribed. Although I have reviewed the note for such errors, some may still exist.